# Patient Record
Sex: FEMALE | Race: WHITE | NOT HISPANIC OR LATINO | Employment: OTHER | URBAN - NONMETROPOLITAN AREA
[De-identification: names, ages, dates, MRNs, and addresses within clinical notes are randomized per-mention and may not be internally consistent; named-entity substitution may affect disease eponyms.]

---

## 2018-02-06 ENCOUNTER — APPOINTMENT (OUTPATIENT)
Dept: CT IMAGING | Facility: HOSPITAL | Age: 33
End: 2018-02-06

## 2018-02-06 ENCOUNTER — HOSPITAL ENCOUNTER (EMERGENCY)
Facility: HOSPITAL | Age: 33
Discharge: HOME | End: 2018-02-06
Attending: EMERGENCY MEDICINE | Admitting: EMERGENCY MEDICINE

## 2018-02-06 ENCOUNTER — APPOINTMENT (OUTPATIENT)
Dept: GENERAL RADIOLOGY | Facility: HOSPITAL | Age: 33
End: 2018-02-06

## 2018-02-06 VITALS
HEART RATE: 85 BPM | TEMPERATURE: 98.9 F | BODY MASS INDEX: 27.75 KG/M2 | WEIGHT: 147 LBS | DIASTOLIC BLOOD PRESSURE: 73 MMHG | RESPIRATION RATE: 12 BRPM | HEIGHT: 61 IN | OXYGEN SATURATION: 97 % | SYSTOLIC BLOOD PRESSURE: 116 MMHG

## 2018-02-06 DIAGNOSIS — R00.2 PALPITATION: Primary | ICD-10-CM

## 2018-02-06 LAB
ALBUMIN SERPL-MCNC: 4.4 G/DL (ref 3.5–5)
ALBUMIN/GLOB SERPL: 1.4 G/DL (ref 1.1–2.5)
ALP SERPL-CCNC: 115 U/L (ref 24–120)
ALT SERPL W P-5'-P-CCNC: 27 U/L (ref 0–54)
AMPHET+METHAMPHET UR QL: NEGATIVE
ANION GAP SERPL CALCULATED.3IONS-SCNC: 13 MMOL/L (ref 4–13)
AST SERPL-CCNC: 23 U/L (ref 7–45)
BARBITURATES UR QL SCN: NEGATIVE
BASOPHILS # BLD AUTO: 0.01 10*3/MM3 (ref 0–0.2)
BASOPHILS NFR BLD AUTO: 0.2 % (ref 0–2)
BENZODIAZ UR QL SCN: NEGATIVE
BILIRUB SERPL-MCNC: 0.2 MG/DL (ref 0.1–1)
BUN BLD-MCNC: 9 MG/DL (ref 5–21)
BUN/CREAT SERPL: 11.5 (ref 7–25)
CALCIUM SPEC-SCNC: 9.3 MG/DL (ref 8.4–10.4)
CANNABINOIDS SERPL QL: NEGATIVE
CHLORIDE SERPL-SCNC: 105 MMOL/L (ref 98–110)
CO2 SERPL-SCNC: 25 MMOL/L (ref 24–31)
COCAINE UR QL: NEGATIVE
CREAT BLD-MCNC: 0.78 MG/DL (ref 0.5–1.4)
D DIMER PPP FEU-MCNC: 0.33 MG/L (FEU) (ref 0–0.5)
DEPRECATED RDW RBC AUTO: 40 FL (ref 40–54)
EOSINOPHIL # BLD AUTO: 0.14 10*3/MM3 (ref 0–0.7)
EOSINOPHIL NFR BLD AUTO: 3.3 % (ref 0–4)
ERYTHROCYTE [DISTWIDTH] IN BLOOD BY AUTOMATED COUNT: 12.1 % (ref 12–15)
FLUAV AG NPH QL: NEGATIVE
FLUBV AG NPH QL IA: NEGATIVE
GFR SERPL CREATININE-BSD FRML MDRD: 87 ML/MIN/1.73
GLOBULIN UR ELPH-MCNC: 3.1 GM/DL
GLUCOSE BLD-MCNC: 89 MG/DL (ref 70–100)
HCG SERPL QL: NEGATIVE
HCT VFR BLD AUTO: 41.6 % (ref 37–47)
HGB BLD-MCNC: 14 G/DL (ref 12–16)
IMM GRANULOCYTES # BLD: 0.01 10*3/MM3 (ref 0–0.03)
IMM GRANULOCYTES NFR BLD: 0.2 % (ref 0–5)
LYMPHOCYTES # BLD AUTO: 0.91 10*3/MM3 (ref 0.72–4.86)
LYMPHOCYTES NFR BLD AUTO: 21.2 % (ref 15–45)
MCH RBC QN AUTO: 30.2 PG (ref 28–32)
MCHC RBC AUTO-ENTMCNC: 33.7 G/DL (ref 33–36)
MCV RBC AUTO: 89.8 FL (ref 82–98)
METHADONE UR QL SCN: NEGATIVE
MONOCYTES # BLD AUTO: 0.69 10*3/MM3 (ref 0.19–1.3)
MONOCYTES NFR BLD AUTO: 16 % (ref 4–12)
NEUTROPHILS # BLD AUTO: 2.54 10*3/MM3 (ref 1.87–8.4)
NEUTROPHILS NFR BLD AUTO: 59.1 % (ref 39–78)
NRBC BLD MANUAL-RTO: 0 /100 WBC (ref 0–0)
OPIATES UR QL: NEGATIVE
PCP UR QL SCN: NEGATIVE
PLATELET # BLD AUTO: 216 10*3/MM3 (ref 130–400)
PMV BLD AUTO: 9.6 FL (ref 6–12)
POTASSIUM BLD-SCNC: 3.6 MMOL/L (ref 3.5–5.3)
PROT SERPL-MCNC: 7.5 G/DL (ref 6.3–8.7)
RBC # BLD AUTO: 4.63 10*6/MM3 (ref 4.2–5.4)
SODIUM BLD-SCNC: 143 MMOL/L (ref 135–145)
TROPONIN I SERPL-MCNC: <0.012 NG/ML (ref 0–0.03)
WBC NRBC COR # BLD: 4.3 10*3/MM3 (ref 4.8–10.8)

## 2018-02-06 PROCEDURE — 85379 FIBRIN DEGRADATION QUANT: CPT | Performed by: EMERGENCY MEDICINE

## 2018-02-06 PROCEDURE — 80307 DRUG TEST PRSMV CHEM ANLYZR: CPT | Performed by: EMERGENCY MEDICINE

## 2018-02-06 PROCEDURE — 93005 ELECTROCARDIOGRAM TRACING: CPT | Performed by: EMERGENCY MEDICINE

## 2018-02-06 PROCEDURE — 99284 EMERGENCY DEPT VISIT MOD MDM: CPT

## 2018-02-06 PROCEDURE — 84484 ASSAY OF TROPONIN QUANT: CPT | Performed by: EMERGENCY MEDICINE

## 2018-02-06 PROCEDURE — 80053 COMPREHEN METABOLIC PANEL: CPT | Performed by: EMERGENCY MEDICINE

## 2018-02-06 PROCEDURE — 85025 COMPLETE CBC W/AUTO DIFF WBC: CPT | Performed by: EMERGENCY MEDICINE

## 2018-02-06 PROCEDURE — 87804 INFLUENZA ASSAY W/OPTIC: CPT | Performed by: EMERGENCY MEDICINE

## 2018-02-06 PROCEDURE — 84703 CHORIONIC GONADOTROPIN ASSAY: CPT | Performed by: EMERGENCY MEDICINE

## 2018-02-06 PROCEDURE — 70450 CT HEAD/BRAIN W/O DYE: CPT

## 2018-02-06 PROCEDURE — 71045 X-RAY EXAM CHEST 1 VIEW: CPT

## 2018-02-06 PROCEDURE — 93010 ELECTROCARDIOGRAM REPORT: CPT | Performed by: INTERNAL MEDICINE

## 2018-02-06 NOTE — ED PROVIDER NOTES
Subjective   HPI Comments: Patient with palpitation she feels like she is floating in a [place]    Patient is a 32 y.o. female presenting with palpitations.   Palpitations   Palpitations quality:  Irregular  Onset quality:  Gradual  Timing:  Intermittent  Progression:  Worsening  Chronicity:  New  Context: not anxiety, not appetite suppressants, not blood loss, not bronchodilators, not dehydration, not exercise, not hyperventilation, not illicit drugs, not nicotine and not stimulant use    Relieved by:  Nothing  Worsened by:  Nothing  Associated symptoms: no back pain, no chest pain, no diaphoresis, no dizziness, no leg pain, no lower extremity edema, no nausea, no near-syncope, no numbness, no orthopnea, no PND, no shortness of breath, no vomiting and no weakness    Risk factors: no diabetes mellitus, no heart disease, no hx of thyroid disease, no hypercoagulable state and no stress        Review of Systems   Constitutional: Negative.  Negative for activity change, appetite change, chills, diaphoresis, fatigue and fever.   HENT: Negative for congestion, drooling, ear pain, facial swelling, hearing loss, sinus pressure and sore throat.    Eyes: Negative.  Negative for discharge.   Respiratory: Negative for shortness of breath.    Cardiovascular: Positive for palpitations. Negative for chest pain, orthopnea, PND and near-syncope.   Gastrointestinal: Negative for abdominal distention, abdominal pain, blood in stool, diarrhea, nausea and vomiting.   Endocrine: Negative.  Negative for cold intolerance, heat intolerance, polydipsia, polyphagia and polyuria.   Genitourinary: Negative.  Negative for dysuria, flank pain and urgency.   Musculoskeletal: Negative.  Negative for arthralgias, back pain, myalgias and neck stiffness.   Skin: Negative.  Negative for color change, pallor and rash.   Allergic/Immunologic: Negative.    Neurological: Negative.  Negative for dizziness, seizures, speech difficulty, weakness, numbness and  headaches.   Hematological: Negative.  Negative for adenopathy.   All other systems reviewed and are negative.      Past Medical History:   Diagnosis Date   • Migraine        No Known Allergies    Past Surgical History:   Procedure Laterality Date   •  SECTION         History reviewed. No pertinent family history.    Social History     Social History   • Marital status:      Spouse name: N/A   • Number of children: N/A   • Years of education: N/A     Social History Main Topics   • Smoking status: Never Smoker   • Smokeless tobacco: None   • Alcohol use No   • Drug use: No   • Sexual activity: Defer     Other Topics Concern   • None     Social History Narrative   • None           Objective   Physical Exam   Constitutional: She is oriented to person, place, and time. She appears well-developed and well-nourished.   HENT:   Head: Normocephalic.   Right Ear: External ear normal.   Eyes: Conjunctivae are normal. Pupils are equal, round, and reactive to light.   Neck: Normal range of motion. Neck supple.   Cardiovascular: Normal rate, regular rhythm, normal heart sounds and intact distal pulses.  PMI is not displaced.  Exam reveals no decreased pulses.    No murmur heard.  Pulmonary/Chest: Effort normal and breath sounds normal. No accessory muscle usage. No tachypnea. No respiratory distress. She has no decreased breath sounds. She has no wheezes. She has no rales. She exhibits no tenderness.   Abdominal: Soft. Bowel sounds are normal. There is no tenderness.   Musculoskeletal: Normal range of motion. She exhibits no edema or tenderness.   Lower extremity exam bilaterally is unremarkable.  There is no right or left calf tenderness .  There is no palpable venous cord.  No obvious difference in the size of the legs.  No pitting edema.  The dorsalis pedis and posterior tibial femoral and popliteal pulses are palpable and +2 bilaterally.  Homans sign is negative       Vascular Status -  Her exam exhibits right  foot vasculature normal. Her exam exhibits no right foot edema. Her exam exhibits left foot vasculature normal. Her exam exhibits no left foot edema.  Neurological: She is alert and oriented to person, place, and time. She has normal reflexes. No cranial nerve deficit. Coordination normal.   Skin: Skin is warm. No rash noted. No erythema.   Nursing note and vitals reviewed.      Procedures         ED Course  ED Course   Comment By Time   Pt case turned over feeling foggy and palpitation Ken Pierce MD 02/06 1826   Case turned over to Dr Khushbu Pierce MD 02/06 1826                  [facility]    Final diagnoses:   Palpitation            Ken Pierce MD  02/06/18 3047

## 2018-02-07 NOTE — ED PROVIDER NOTES
Subjective   History of Present Illness    Review of Systems    Past Medical History:   Diagnosis Date   • Migraine        No Known Allergies    Past Surgical History:   Procedure Laterality Date   •  SECTION         History reviewed. No pertinent family history.    Social History     Social History   • Marital status:      Spouse name: N/A   • Number of children: N/A   • Years of education: N/A     Social History Main Topics   • Smoking status: Never Smoker   • Smokeless tobacco: None   • Alcohol use No   • Drug use: No   • Sexual activity: Defer     Other Topics Concern   • None     Social History Narrative   • None           Objective   Physical Exam    Procedures         ED Course  ED Course   Comment By Time   Pt case turned over feeling foggy and palpitation Ken Pierce MD 1826   Case turned over to Dr Khushbu Pierce MD 1826      Lab Results (last 24 hours)     Procedure Component Value Units Date/Time    CBC & Differential [[id number]] Collected:  18    Specimen:  Blood Updated:  18    Narrative:       The following orders were created for panel order CBC & Differential.  Procedure                               Abnormality         Status                     ---------                               -----------         ------                     CBC Auto Differential[[id number]]        Abnormal            Final result                 Please view results for these tests on the individual orders.    hCG, Serum, Qualitative [[id number]]  (Normal) Collected:  18    Specimen:  Blood Updated:  18     HCG Qualitative Negative    D-dimer, Quantitative [[id number]]  (Normal) Collected:  18    Specimen:  Blood Updated:  18     D-Dimer, Quantitative 0.33 mg/L (FEU)     Narrative:       Reference Range is 0-0.50 mg/L FEU. However, results <0.50 mg/L FEU tends to rule out DVT or PE. Results >0.50 mg/L FEU are not useful in  predicting absence or presence of DVT or PE.    CBC Auto Differential [[id number]]  (Abnormal) Collected:  02/06/18 1738    Specimen:  Blood Updated:  02/06/18 1750     WBC 4.30 (L) 10*3/mm3      RBC 4.63 10*6/mm3      Hemoglobin 14.0 g/dL      Hematocrit 41.6 %      MCV 89.8 fL      MCH 30.2 pg      MCHC 33.7 g/dL      RDW 12.1 %      RDW-SD 40.0 fl      MPV 9.6 fL      Platelets 216 10*3/mm3      Neutrophil % 59.1 %      Lymphocyte % 21.2 %      Monocyte % 16.0 (H) %      Eosinophil % 3.3 %      Basophil % 0.2 %      Immature Grans % 0.2 %      Neutrophils, Absolute 2.54 10*3/mm3      Lymphocytes, Absolute 0.91 10*3/mm3      Monocytes, Absolute 0.69 10*3/mm3      Eosinophils, Absolute 0.14 10*3/mm3      Basophils, Absolute 0.01 10*3/mm3      Immature Grans, Absolute 0.01 10*3/mm3      nRBC 0.0 /100 WBC     Comprehensive Metabolic Panel [[id number]] Collected:  02/06/18 1739    Specimen:  Blood Updated:  02/06/18 1815     Glucose 89 mg/dL      BUN 9 mg/dL       Specimen hemolyzed. Results may be affected.        Creatinine 0.78 mg/dL      Sodium 143 mmol/L      Potassium 3.6 mmol/L       Specimen hemolyzed.  Results may be affected.        Chloride 105 mmol/L      CO2 25.0 mmol/L      Calcium 9.3 mg/dL      Total Protein 7.5 g/dL      Albumin 4.40 g/dL      ALT (SGPT) 27 U/L       Specimen hemolyzed.  Results may be affected.        AST (SGOT) 23 U/L       Specimen hemolyzed.  Results may be affected.        Alkaline Phosphatase 115 U/L       Specimen hemolyzed. Results may be affected.        Total Bilirubin 0.2 mg/dL      eGFR Non African Amer 87 mL/min/1.73      Globulin 3.1 gm/dL      A/G Ratio 1.4 g/dL      BUN/Creatinine Ratio 11.5     Anion Gap 13.0 mmol/L     Troponin [[id number]]  (Normal) Collected:  02/06/18 1739    Specimen:  Blood Updated:  02/06/18 1810     Troponin I <0.012 ng/mL     Urine Drug Screen - Urine, Clean Catch [[id number]]  (Normal) Collected:  02/06/18 1759    Specimen:  Urine from Urine,  Clean Catch Updated:  02/06/18 1822     Amphetamine Screen, Urine Negative     Barbiturates Screen, Urine Negative     Benzodiazepine Screen, Urine Negative     Cocaine Screen, Urine Negative     Methadone Screen, Urine Negative     Opiate Screen Negative     Phencyclidine (PCP), Urine Negative     THC, Screen, Urine Negative    Narrative:       Negative Thresholds For Drugs Screened in Urine:    Amphetamines          500 ng/ml  Barbiturates          200 ng/ml  Benzodiazepines       200 ng/ml  Cocaine               150 ng/ml  Methadone             150 ng/ml  Opiates               300 ng/ml  Phencyclidine         25 ng/ml  THC                      50 ng/ml    The normal value for all drugs tested is negative. This report includes final unconfirmed screening results.  A positive result by this assay can be, at your request, sent to the Reference Lab for confirmation by gas chromatography. Unconfirmed results must not be used for non-medical purposes, such as employment or legal testing. Clinical consideration should be applied to any drug of abuse test result, particularly when unconfirmed results are used.    Influenza Antigen, Rapid - Swab, Nasopharynx [[id number]]  (Normal) Collected:  02/06/18 1802    Specimen:  Swab from Nasopharynx Updated:  02/06/18 1828     Influenza A Ag, EIA Negative     Influenza B Ag, EIA Negative    Narrative:         Recommend confirmation of negative results by viral culture or molecular assay.        CT Head Without Contrast   Final Result   Impression:   No acute intracranial abnormality.   This report was finalized on 02/06/2018 20:06 by  Chris Freeman, .      XR Chest 1 View   Final Result   Impression:   Negative chest radiograph.   This report was finalized on 02/06/2018 18:12 by  Chris Freeman, .        LABS were negative.  Imaging was negative.  No cause for palpitations found.  Patient was monitored throughout her stay.  Patient has recently stopped her Topamax and advised to follow-up  with her PCP for further evaluation of her migraines.  No pain while here in the ER.  Patient will also be scheduled for an outpatient Holter monitor.  Return immediately for any chest pain, worsening palpitations, fever or other concerns.            MDM    Final diagnoses:   Palpitation            Nanci Ríos MD  02/06/18 2023

## 2018-02-08 NOTE — ED NOTES
"ED Call Back Questions    1. How are you doing since leaving the Emergency Department?    Doing ok  2. Do you have any questions about your discharge instructions? No     3. Have you filled your new prescriptions yet? N/A  a. Do you have any questions about those medications? N/A    4. Were you able to make a follow-up appointment with the physician? No     5. Do you have a primary care physician? Yes   a. If No, would you like for me to set you up with one? N/A  i. If Yes, “I will have our ED  give you a call right back at this number to work with you on the best time for an appointment.”    6. We are always looking to get better at what we do. Do you have any suggestions for what we can do to be even better? N/A  a. If Yes, \"Thank you for sharing your concerns. I apologize. I will follow up with our manager and patient . Would you like someone to call you back?\" N/A    7. Is there anything else I can do for you? N/A  Visit was good     Luis Alberto Rodriguez  02/08/18 3710    "

## 2020-11-05 LAB
EXTERNAL ANTIBODY SCREEN: NEGATIVE
EXTERNAL CHLAMYDIA SCREEN: NEGATIVE
EXTERNAL GONORRHEA SCREEN: NEGATIVE
EXTERNAL HEPATITIS B SURFACE ANTIGEN: NEGATIVE
EXTERNAL HERPES PCR: NEGATIVE
EXTERNAL RUBELLA QUALITATIVE: NORMAL
EXTERNAL SYPHILIS RPR SCREEN: NEGATIVE
HIV1 P24 AG SERPL QL IA: NORMAL

## 2020-11-16 PROCEDURE — U0003 INFECTIOUS AGENT DETECTION BY NUCLEIC ACID (DNA OR RNA); SEVERE ACUTE RESPIRATORY SYNDROME CORONAVIRUS 2 (SARS-COV-2) (CORONAVIRUS DISEASE [COVID-19]), AMPLIFIED PROBE TECHNIQUE, MAKING USE OF HIGH THROUGHPUT TECHNOLOGIES AS DESCRIBED BY CMS-2020-01-R: HCPCS | Performed by: NURSE PRACTITIONER

## 2021-01-23 ENCOUNTER — HOSPITAL ENCOUNTER (EMERGENCY)
Facility: HOSPITAL | Age: 36
Discharge: HOME | End: 2021-01-23
Attending: EMERGENCY MEDICINE | Admitting: EMERGENCY MEDICINE

## 2021-01-23 VITALS
BODY MASS INDEX: 29.07 KG/M2 | DIASTOLIC BLOOD PRESSURE: 75 MMHG | SYSTOLIC BLOOD PRESSURE: 124 MMHG | HEIGHT: 61 IN | HEART RATE: 97 BPM | OXYGEN SATURATION: 98 % | RESPIRATION RATE: 16 BRPM | TEMPERATURE: 98.9 F | WEIGHT: 154 LBS

## 2021-01-23 DIAGNOSIS — K08.89 PAIN, DENTAL: Primary | ICD-10-CM

## 2021-01-23 PROCEDURE — 99283 EMERGENCY DEPT VISIT LOW MDM: CPT

## 2021-01-23 RX ORDER — PENICILLIN V POTASSIUM 500 MG/1
500 TABLET ORAL 4 TIMES DAILY
Qty: 20 TABLET | Refills: 0 | Status: SHIPPED | OUTPATIENT
Start: 2021-01-23 | End: 2021-01-28

## 2021-01-23 RX ORDER — ACETAMINOPHEN 500 MG
1000 TABLET ORAL ONCE
Status: COMPLETED | OUTPATIENT
Start: 2021-01-23 | End: 2021-01-23

## 2021-01-23 RX ADMIN — ACETAMINOPHEN 1000 MG: 500 TABLET, FILM COATED ORAL at 13:01

## 2021-01-23 NOTE — DISCHARGE INSTRUCTIONS
Please return immediately to the emergency department for any new or worsening symptoms. Please see a dentist on Monday for re-evaluation.

## 2021-01-23 NOTE — ED PROVIDER NOTES
Subjective   This is a very pleasant 35-year-old lady with a past medical history of migraines who is currently 20 weeks pregnant who presents the emergency department chief complaint of dental pain.  She first noticed this yesterday.  Gradual in onset.  Moderate.  Sharp.  Nonradiating.  No exacerbating relieving factors.  Located in her front, upper teeth.  Denies any fevers, chills, voice changes, trouble swallowing, swelling, changes in vision, painful eye movements, chest pain, or shortness of breath.  States she was trying to see a dentist today but unable to get into see 1.    Past medical history: Migraines  Social history: Denies tobacco, alcohol, or illicit drug use      History provided by:  Patient      Review of Systems   All other systems reviewed and are negative.      Past Medical History:   Diagnosis Date   • Migraine        No Known Allergies    Past Surgical History:   Procedure Laterality Date   •  SECTION         History reviewed. No pertinent family history.    Social History     Socioeconomic History   • Marital status:      Spouse name: Not on file   • Number of children: Not on file   • Years of education: Not on file   • Highest education level: Not on file   Tobacco Use   • Smoking status: Never Smoker   Substance and Sexual Activity   • Alcohol use: No   • Drug use: No   • Sexual activity: Defer           Objective   Physical Exam  Vitals signs and nursing note reviewed.   Constitutional:       General: She is not in acute distress.     Appearance: Normal appearance. She is normal weight. She is not ill-appearing, toxic-appearing or diaphoretic.   HENT:      Head: Normocephalic and atraumatic.      Comments: Dental exam shows no obvious swelling although patient has pain to the gingiva along her left central incisor on the maxillary side.  Extraocular movements are intact.  Midline uvula.  No submandibular or sublingual swelling.  No pain with full range of motion of the  neck.     Nose: Nose normal.      Mouth/Throat:      Mouth: Mucous membranes are moist.   Eyes:      Extraocular Movements: Extraocular movements intact.   Neck:      Musculoskeletal: Normal range of motion and neck supple.   Cardiovascular:      Rate and Rhythm: Normal rate and regular rhythm.      Pulses: Normal pulses.      Heart sounds: Normal heart sounds. No murmur. No friction rub. No gallop.    Pulmonary:      Effort: Pulmonary effort is normal. No respiratory distress.      Breath sounds: Normal breath sounds. No stridor. No wheezing, rhonchi or rales.   Abdominal:      General: Abdomen is flat. Bowel sounds are normal. There is no distension.      Palpations: There is no mass.      Tenderness: There is no abdominal tenderness. There is no guarding or rebound.      Hernia: No hernia is present.   Musculoskeletal: Normal range of motion.         General: No swelling or tenderness.   Skin:     General: Skin is warm and dry.      Capillary Refill: Capillary refill takes less than 2 seconds.      Coloration: Skin is not jaundiced or pale.      Findings: No bruising, erythema, lesion or rash.   Neurological:      General: No focal deficit present.      Mental Status: She is alert and oriented to person, place, and time.   Psychiatric:         Mood and Affect: Mood normal.         Behavior: Behavior normal.         Thought Content: Thought content normal.         Judgment: Judgment normal.         Procedures           ED Course                                           MDM  Number of Diagnoses or Management Options  Pain, dental: new and requires workup  Diagnosis management comments: Patient presents with dental pain.  Upon arrival in no acute distress vital signs are reassuring.  Low concern for facial cellulitis with no erythema along the face, no fever, no chills.  Low concern for orbital cellulitis or venous sinus thrombus with extraocular movements intact without pain or limitation.  Low concern for  Abdoul's angina with no submandibular or sublingual swelling.  Low concern for retropharyngeal abscess with full range of motion of the neck without pain.  No signs of peritonsillar abscess on exam.  She has no systemic signs of infection.  No pyogenic granuloma on exam.  No obvious dental abscess or swelling on exam.  She has no trouble tolerating her secretions or even p.o. intake.  She has no chest pain or shortness of breath.  Had a long discussion with the patient about pain control and how we can only use Tylenol because she is pregnant and she graciously verbalizes understanding and is given Tylenol.  No indications for need for CT scan at this time.  No drainable lesion on exam.  She is given prophylactic penicillin.  She states she will see a dentist on Monday.  We had a long discussion about return precautions and she verbalized understanding.  She is discharged in good condition with normal vitals and is given commonsense return precautions which she verbalizes understanding of.    Risk of Complications, Morbidity, and/or Mortality  Presenting problems: moderate  Diagnostic procedures: low  Management options: moderate        Final diagnoses:   Pain, dental            Jude Roach MD  01/23/21 1325

## 2021-05-20 LAB — EXTERNAL GROUP B STREP ANTIGEN: NEGATIVE

## 2021-06-10 ENCOUNTER — HOSPITAL ENCOUNTER (OUTPATIENT)
Facility: HOSPITAL | Age: 36
Setting detail: OBSERVATION
Discharge: HOME | End: 2021-06-10
Attending: OBSTETRICS & GYNECOLOGY | Admitting: OBSTETRICS & GYNECOLOGY

## 2021-06-10 VITALS
HEART RATE: 88 BPM | TEMPERATURE: 98.6 F | RESPIRATION RATE: 18 BRPM | WEIGHT: 177 LBS | HEIGHT: 61 IN | DIASTOLIC BLOOD PRESSURE: 74 MMHG | BODY MASS INDEX: 33.42 KG/M2 | SYSTOLIC BLOOD PRESSURE: 137 MMHG

## 2021-06-10 PROBLEM — Z34.90 PREGNANCY: Status: ACTIVE | Noted: 2021-06-10

## 2021-06-10 LAB
ALBUMIN SERPL-MCNC: 2.9 G/DL (ref 3.5–5.2)
ALBUMIN/GLOB SERPL: 0.9 G/DL
ALP SERPL-CCNC: 152 U/L (ref 39–117)
ALT SERPL W P-5'-P-CCNC: 11 U/L (ref 1–33)
ANION GAP SERPL CALCULATED.3IONS-SCNC: 9 MMOL/L (ref 5–15)
AST SERPL-CCNC: 27 U/L (ref 1–32)
BILIRUB SERPL-MCNC: <0.2 MG/DL (ref 0–1.2)
BUN SERPL-MCNC: 13 MG/DL (ref 6–20)
BUN/CREAT SERPL: 32.5 (ref 7–25)
CALCIUM SPEC-SCNC: 9.3 MG/DL (ref 8.6–10.5)
CHLORIDE SERPL-SCNC: 106 MMOL/L (ref 98–107)
CO2 SERPL-SCNC: 23 MMOL/L (ref 22–29)
CREAT SERPL-MCNC: 0.4 MG/DL (ref 0.57–1)
DEPRECATED RDW RBC AUTO: 47.2 FL (ref 37–54)
ERYTHROCYTE [DISTWIDTH] IN BLOOD BY AUTOMATED COUNT: 13.9 % (ref 12.3–15.4)
EXPIRATION DATE: ABNORMAL
GFR SERPL CREATININE-BSD FRML MDRD: >150 ML/MIN/1.73
GLOBULIN UR ELPH-MCNC: 3.2 GM/DL
GLUCOSE SERPL-MCNC: 83 MG/DL (ref 65–99)
HCT VFR BLD AUTO: 33.2 % (ref 34–46.6)
HGB BLD-MCNC: 11 G/DL (ref 12–15.9)
LDH SERPL-CCNC: 243 U/L (ref 135–214)
Lab: 5015
MCH RBC QN AUTO: 30.6 PG (ref 26.6–33)
MCHC RBC AUTO-ENTMCNC: 33.1 G/DL (ref 31.5–35.7)
MCV RBC AUTO: 92.2 FL (ref 79–97)
PLATELET # BLD AUTO: 191 10*3/MM3 (ref 140–450)
PMV BLD AUTO: 10.5 FL (ref 6–12)
POTASSIUM SERPL-SCNC: 4.1 MMOL/L (ref 3.5–5.2)
PROT SERPL-MCNC: 6.1 G/DL (ref 6–8.5)
PROT UR STRIP-MCNC: ABNORMAL MG/DL
RBC # BLD AUTO: 3.6 10*6/MM3 (ref 3.77–5.28)
SODIUM SERPL-SCNC: 138 MMOL/L (ref 136–145)
URATE SERPL-MCNC: 4.6 MG/DL (ref 2.4–5.7)
WBC # BLD AUTO: 7.87 10*3/MM3 (ref 3.4–10.8)

## 2021-06-10 PROCEDURE — G0378 HOSPITAL OBSERVATION PER HR: HCPCS

## 2021-06-10 PROCEDURE — G0463 HOSPITAL OUTPT CLINIC VISIT: HCPCS

## 2021-06-10 PROCEDURE — 83615 LACTATE (LD) (LDH) ENZYME: CPT | Performed by: OBSTETRICS & GYNECOLOGY

## 2021-06-10 PROCEDURE — 84550 ASSAY OF BLOOD/URIC ACID: CPT | Performed by: OBSTETRICS & GYNECOLOGY

## 2021-06-10 PROCEDURE — 81002 URINALYSIS NONAUTO W/O SCOPE: CPT | Performed by: OBSTETRICS & GYNECOLOGY

## 2021-06-10 PROCEDURE — 85027 COMPLETE CBC AUTOMATED: CPT | Performed by: OBSTETRICS & GYNECOLOGY

## 2021-06-10 PROCEDURE — 80053 COMPREHEN METABOLIC PANEL: CPT | Performed by: OBSTETRICS & GYNECOLOGY

## 2021-06-11 NOTE — PROGRESS NOTES
Dylan Ureña  : 1985  MRN: [id number]  CSN: [id number]    Labor progress note    Subjective   She reports no problems, elevated BP in office.  Labs and BP normal on LDR     Objective   Min/max vitals past 24 hours:  Temp  Min: 98.6 °F (37 °C)  Max: 98.6 °F (37 °C)   BP  Min: 123/76  Max: 139/77   Pulse  Min: 88  Max: 114   Resp  Min: 18  Max: 18        FHT's: reactive and category 1.  external monitors used   Cervix: was not checked.   Contractions: none      Assessment   1. IUP at 38w6d  2. Elevated BP not found     Plan   1. OK to discharge    Priya Moreno MD  2021  09:26 CDT

## 2021-06-15 ENCOUNTER — PREP FOR SURGERY (OUTPATIENT)
Dept: OTHER | Facility: HOSPITAL | Age: 36
End: 2021-06-15

## 2021-06-15 DIAGNOSIS — O34.211 MATERNAL CARE FOR LOW TRANSVERSE SCAR FROM PREVIOUS CESAREAN DELIVERY: Primary | ICD-10-CM

## 2021-06-15 RX ORDER — BUPIVACAINE HCL/0.9 % NACL/PF 0.1 %
2 PLASTIC BAG, INJECTION (ML) EPIDURAL ONCE
Status: CANCELLED | OUTPATIENT
Start: 2021-06-16

## 2021-06-15 RX ORDER — SODIUM CHLORIDE 0.9 % (FLUSH) 0.9 %
3 SYRINGE (ML) INJECTION EVERY 12 HOURS SCHEDULED
Status: CANCELLED | OUTPATIENT
Start: 2021-06-16

## 2021-06-15 RX ORDER — SODIUM CHLORIDE 0.9 % (FLUSH) 0.9 %
3-10 SYRINGE (ML) INJECTION AS NEEDED
Status: CANCELLED | OUTPATIENT
Start: 2021-06-16

## 2021-06-15 RX ORDER — SODIUM CHLORIDE, SODIUM LACTATE, POTASSIUM CHLORIDE, CALCIUM CHLORIDE 600; 310; 30; 20 MG/100ML; MG/100ML; MG/100ML; MG/100ML
125 INJECTION, SOLUTION INTRAVENOUS CONTINUOUS
Status: CANCELLED | OUTPATIENT
Start: 2021-06-16

## 2021-06-15 RX ORDER — LIDOCAINE HYDROCHLORIDE 10 MG/ML
5 INJECTION, SOLUTION EPIDURAL; INFILTRATION; INTRACAUDAL; PERINEURAL AS NEEDED
Status: CANCELLED | OUTPATIENT
Start: 2021-06-16

## 2021-06-16 ENCOUNTER — ANESTHESIA EVENT (OUTPATIENT)
Dept: LABOR AND DELIVERY | Facility: HOSPITAL | Age: 36
End: 2021-06-16

## 2021-06-16 ENCOUNTER — ANESTHESIA (OUTPATIENT)
Dept: LABOR AND DELIVERY | Facility: HOSPITAL | Age: 36
End: 2021-06-16

## 2021-06-16 ENCOUNTER — HOSPITAL ENCOUNTER (INPATIENT)
Facility: HOSPITAL | Age: 36
LOS: 3 days | Discharge: HOME | End: 2021-06-19
Attending: OBSTETRICS & GYNECOLOGY | Admitting: OBSTETRICS & GYNECOLOGY

## 2021-06-16 DIAGNOSIS — O34.211 MATERNAL CARE FOR LOW TRANSVERSE SCAR FROM PREVIOUS CESAREAN DELIVERY: ICD-10-CM

## 2021-06-16 PROBLEM — Z34.90 PREGNANCY: Status: RESOLVED | Noted: 2021-06-10 | Resolved: 2021-06-16

## 2021-06-16 LAB
ABO GROUP BLD: NORMAL
BASOPHILS # BLD AUTO: 0.02 10*3/MM3 (ref 0–0.2)
BASOPHILS NFR BLD AUTO: 0.2 % (ref 0–1.5)
BLD GP AB SCN SERPL QL: NEGATIVE
DEPRECATED RDW RBC AUTO: 47.4 FL (ref 37–54)
EOSINOPHIL # BLD AUTO: 0.15 10*3/MM3 (ref 0–0.4)
EOSINOPHIL NFR BLD AUTO: 1.6 % (ref 0.3–6.2)
ERYTHROCYTE [DISTWIDTH] IN BLOOD BY AUTOMATED COUNT: 14.1 % (ref 12.3–15.4)
HCT VFR BLD AUTO: 33.8 % (ref 34–46.6)
HGB BLD-MCNC: 11.3 G/DL (ref 12–15.9)
IMM GRANULOCYTES # BLD AUTO: 0.05 10*3/MM3 (ref 0–0.05)
IMM GRANULOCYTES NFR BLD AUTO: 0.5 % (ref 0–0.5)
LYMPHOCYTES # BLD AUTO: 1.75 10*3/MM3 (ref 0.7–3.1)
LYMPHOCYTES NFR BLD AUTO: 18.8 % (ref 19.6–45.3)
MCH RBC QN AUTO: 30.9 PG (ref 26.6–33)
MCHC RBC AUTO-ENTMCNC: 33.4 G/DL (ref 31.5–35.7)
MCV RBC AUTO: 92.3 FL (ref 79–97)
MONOCYTES # BLD AUTO: 0.82 10*3/MM3 (ref 0.1–0.9)
MONOCYTES NFR BLD AUTO: 8.8 % (ref 5–12)
NEUTROPHILS NFR BLD AUTO: 6.52 10*3/MM3 (ref 1.7–7)
NEUTROPHILS NFR BLD AUTO: 70.1 % (ref 42.7–76)
NRBC BLD AUTO-RTO: 0 /100 WBC (ref 0–0.2)
PLATELET # BLD AUTO: 192 10*3/MM3 (ref 140–450)
PMV BLD AUTO: 10.5 FL (ref 6–12)
RBC # BLD AUTO: 3.66 10*6/MM3 (ref 3.77–5.28)
RH BLD: POSITIVE
T&S EXPIRATION DATE: NORMAL
WBC # BLD AUTO: 9.31 10*3/MM3 (ref 3.4–10.8)

## 2021-06-16 PROCEDURE — 86901 BLOOD TYPING SEROLOGIC RH(D): CPT | Performed by: OBSTETRICS & GYNECOLOGY

## 2021-06-16 PROCEDURE — 25010000002 CEFEPIME PER 500 MG: Performed by: OBSTETRICS & GYNECOLOGY

## 2021-06-16 PROCEDURE — C1765 ADHESION BARRIER: HCPCS | Performed by: OBSTETRICS & GYNECOLOGY

## 2021-06-16 PROCEDURE — 25010000002 DEXAMETHASONE PER 1 MG: Performed by: NURSE ANESTHETIST, CERTIFIED REGISTERED

## 2021-06-16 PROCEDURE — 94799 UNLISTED PULMONARY SVC/PX: CPT

## 2021-06-16 PROCEDURE — 86900 BLOOD TYPING SEROLOGIC ABO: CPT | Performed by: OBSTETRICS & GYNECOLOGY

## 2021-06-16 PROCEDURE — 25010000002 HYDROMORPHONE 1 MG/ML SOLUTION: Performed by: NURSE ANESTHETIST, CERTIFIED REGISTERED

## 2021-06-16 PROCEDURE — 25010000002 BUTORPHANOL PER 1 MG: Performed by: NURSE ANESTHETIST, CERTIFIED REGISTERED

## 2021-06-16 PROCEDURE — 85025 COMPLETE CBC W/AUTO DIFF WBC: CPT | Performed by: OBSTETRICS & GYNECOLOGY

## 2021-06-16 PROCEDURE — 88307 TISSUE EXAM BY PATHOLOGIST: CPT | Performed by: OBSTETRICS & GYNECOLOGY

## 2021-06-16 PROCEDURE — 51702 INSERT TEMP BLADDER CATH: CPT

## 2021-06-16 PROCEDURE — 25010000002 CEFAZOLIN PER 500 MG: Performed by: OBSTETRICS & GYNECOLOGY

## 2021-06-16 PROCEDURE — 25010000002 ONDANSETRON PER 1 MG: Performed by: NURSE ANESTHETIST, CERTIFIED REGISTERED

## 2021-06-16 PROCEDURE — 86850 RBC ANTIBODY SCREEN: CPT | Performed by: OBSTETRICS & GYNECOLOGY

## 2021-06-16 DEVICE — DEV CONTRL TISS STRATAFIX SPIRAL PGPCL 2/0FS 30X30CM: Type: IMPLANTABLE DEVICE | Site: ABDOMEN | Status: FUNCTIONAL

## 2021-06-16 DEVICE — ABSORBABLE ADHESION BARRIER
Type: IMPLANTABLE DEVICE | Site: ABDOMEN | Status: FUNCTIONAL
Brand: GYNECARE INTERCEED

## 2021-06-16 RX ORDER — SODIUM CHLORIDE 0.9 % (FLUSH) 0.9 %
3-10 SYRINGE (ML) INJECTION AS NEEDED
Status: DISCONTINUED | OUTPATIENT
Start: 2021-06-16 | End: 2021-06-19 | Stop reason: HOSPADM

## 2021-06-16 RX ORDER — ONDANSETRON 2 MG/ML
4 INJECTION INTRAMUSCULAR; INTRAVENOUS EVERY 6 HOURS PRN
Status: DISCONTINUED | OUTPATIENT
Start: 2021-06-16 | End: 2021-06-19 | Stop reason: HOSPADM

## 2021-06-16 RX ORDER — PROMETHAZINE HYDROCHLORIDE 12.5 MG/1
12.5 SUPPOSITORY RECTAL EVERY 6 HOURS PRN
Status: DISCONTINUED | OUTPATIENT
Start: 2021-06-16 | End: 2021-06-19 | Stop reason: HOSPADM

## 2021-06-16 RX ORDER — BUTORPHANOL TARTRATE 1 MG/ML
0.5 INJECTION, SOLUTION INTRAMUSCULAR; INTRAVENOUS EVERY 6 HOURS PRN
Status: DISCONTINUED | OUTPATIENT
Start: 2021-06-16 | End: 2021-06-19 | Stop reason: HOSPADM

## 2021-06-16 RX ORDER — PRENATAL VIT/IRON FUM/FOLIC AC 27MG-0.8MG
1 TABLET ORAL DAILY
Status: DISCONTINUED | OUTPATIENT
Start: 2021-06-16 | End: 2021-06-19 | Stop reason: HOSPADM

## 2021-06-16 RX ORDER — NALOXONE HCL 0.4 MG/ML
0.4 VIAL (ML) INJECTION
Status: ACTIVE | OUTPATIENT
Start: 2021-06-16 | End: 2021-06-17

## 2021-06-16 RX ORDER — EPHEDRINE SULFATE 50 MG/ML
INJECTION, SOLUTION INTRAVENOUS AS NEEDED
Status: DISCONTINUED | OUTPATIENT
Start: 2021-06-16 | End: 2021-06-16 | Stop reason: SURG

## 2021-06-16 RX ORDER — FAMOTIDINE 10 MG/ML
20 INJECTION, SOLUTION INTRAVENOUS ONCE AS NEEDED
Status: COMPLETED | OUTPATIENT
Start: 2021-06-16 | End: 2021-06-16

## 2021-06-16 RX ORDER — ONDANSETRON 4 MG/1
4 TABLET, FILM COATED ORAL EVERY 6 HOURS PRN
Status: DISCONTINUED | OUTPATIENT
Start: 2021-06-16 | End: 2021-06-19 | Stop reason: HOSPADM

## 2021-06-16 RX ORDER — ONDANSETRON 2 MG/ML
INJECTION INTRAMUSCULAR; INTRAVENOUS AS NEEDED
Status: DISCONTINUED | OUTPATIENT
Start: 2021-06-16 | End: 2021-06-16 | Stop reason: SURG

## 2021-06-16 RX ORDER — BUPIVACAINE HCL/0.9 % NACL/PF 0.1 %
2 PLASTIC BAG, INJECTION (ML) EPIDURAL ONCE
Status: COMPLETED | OUTPATIENT
Start: 2021-06-16 | End: 2021-06-16

## 2021-06-16 RX ORDER — SODIUM CHLORIDE, SODIUM LACTATE, POTASSIUM CHLORIDE, CALCIUM CHLORIDE 600; 310; 30; 20 MG/100ML; MG/100ML; MG/100ML; MG/100ML
125 INJECTION, SOLUTION INTRAVENOUS CONTINUOUS
Status: DISCONTINUED | OUTPATIENT
Start: 2021-06-16 | End: 2021-06-19 | Stop reason: HOSPADM

## 2021-06-16 RX ORDER — OXYCODONE HYDROCHLORIDE AND ACETAMINOPHEN 5; 325 MG/1; MG/1
1 TABLET ORAL EVERY 4 HOURS PRN
Status: DISCONTINUED | OUTPATIENT
Start: 2021-06-17 | End: 2021-06-18

## 2021-06-16 RX ORDER — OXYCODONE AND ACETAMINOPHEN 7.5; 325 MG/1; MG/1
2 TABLET ORAL EVERY 4 HOURS PRN
Status: ACTIVE | OUTPATIENT
Start: 2021-06-16 | End: 2021-06-17

## 2021-06-16 RX ORDER — OXYTOCIN 10 [USP'U]/ML
INJECTION, SOLUTION INTRAMUSCULAR; INTRAVENOUS AS NEEDED
Status: DISCONTINUED | OUTPATIENT
Start: 2021-06-16 | End: 2021-06-16 | Stop reason: SURG

## 2021-06-16 RX ORDER — OXYCODONE AND ACETAMINOPHEN 10; 325 MG/1; MG/1
1 TABLET ORAL EVERY 6 HOURS PRN
Status: DISCONTINUED | OUTPATIENT
Start: 2021-06-17 | End: 2021-06-18

## 2021-06-16 RX ORDER — OXYCODONE HYDROCHLORIDE AND ACETAMINOPHEN 5; 325 MG/1; MG/1
1 TABLET ORAL EVERY 4 HOURS PRN
Status: DISCONTINUED | OUTPATIENT
Start: 2021-06-16 | End: 2021-06-16

## 2021-06-16 RX ORDER — DEXAMETHASONE SODIUM PHOSPHATE 4 MG/ML
INJECTION, SOLUTION INTRA-ARTICULAR; INTRALESIONAL; INTRAMUSCULAR; INTRAVENOUS; SOFT TISSUE AS NEEDED
Status: DISCONTINUED | OUTPATIENT
Start: 2021-06-16 | End: 2021-06-16 | Stop reason: SURG

## 2021-06-16 RX ORDER — AMOXICILLIN 250 MG
1 CAPSULE ORAL 2 TIMES DAILY
Status: DISCONTINUED | OUTPATIENT
Start: 2021-06-16 | End: 2021-06-19 | Stop reason: HOSPADM

## 2021-06-16 RX ORDER — PROMETHAZINE HYDROCHLORIDE 25 MG/1
25 SUPPOSITORY RECTAL EVERY 4 HOURS PRN
Status: DISCONTINUED | OUTPATIENT
Start: 2021-06-16 | End: 2021-06-19 | Stop reason: HOSPADM

## 2021-06-16 RX ORDER — OXYCODONE AND ACETAMINOPHEN 7.5; 325 MG/1; MG/1
1 TABLET ORAL EVERY 4 HOURS PRN
Status: DISPENSED | OUTPATIENT
Start: 2021-06-16 | End: 2021-06-17

## 2021-06-16 RX ORDER — ONDANSETRON 2 MG/ML
4 INJECTION INTRAMUSCULAR; INTRAVENOUS EVERY 6 HOURS PRN
Status: DISCONTINUED | OUTPATIENT
Start: 2021-06-16 | End: 2021-06-16 | Stop reason: SDUPTHER

## 2021-06-16 RX ORDER — LIDOCAINE HYDROCHLORIDE 10 MG/ML
5 INJECTION, SOLUTION EPIDURAL; INFILTRATION; INTRACAUDAL; PERINEURAL AS NEEDED
Status: DISCONTINUED | OUTPATIENT
Start: 2021-06-16 | End: 2021-06-16 | Stop reason: HOSPADM

## 2021-06-16 RX ORDER — SODIUM CHLORIDE 0.9 % (FLUSH) 0.9 %
3-10 SYRINGE (ML) INJECTION AS NEEDED
Status: DISCONTINUED | OUTPATIENT
Start: 2021-06-16 | End: 2021-06-16 | Stop reason: HOSPADM

## 2021-06-16 RX ORDER — OXYTOCIN/0.9 % SODIUM CHLORIDE 30/500 ML
999 PLASTIC BAG, INJECTION (ML) INTRAVENOUS ONCE
Status: DISCONTINUED | OUTPATIENT
Start: 2021-06-16 | End: 2021-06-19 | Stop reason: HOSPADM

## 2021-06-16 RX ORDER — OXYTOCIN/0.9 % SODIUM CHLORIDE 30/500 ML
125 PLASTIC BAG, INJECTION (ML) INTRAVENOUS CONTINUOUS PRN
Status: DISCONTINUED | OUTPATIENT
Start: 2021-06-16 | End: 2021-06-19 | Stop reason: HOSPADM

## 2021-06-16 RX ORDER — SODIUM CHLORIDE, SODIUM LACTATE, POTASSIUM CHLORIDE, CALCIUM CHLORIDE 600; 310; 30; 20 MG/100ML; MG/100ML; MG/100ML; MG/100ML
125 INJECTION, SOLUTION INTRAVENOUS CONTINUOUS
Status: DISCONTINUED | OUTPATIENT
Start: 2021-06-16 | End: 2021-06-16

## 2021-06-16 RX ORDER — SODIUM CHLORIDE 0.9 % (FLUSH) 0.9 %
3 SYRINGE (ML) INJECTION EVERY 12 HOURS SCHEDULED
Status: DISCONTINUED | OUTPATIENT
Start: 2021-06-16 | End: 2021-06-16 | Stop reason: HOSPADM

## 2021-06-16 RX ORDER — FENTANYL CITRATE 50 UG/ML
INJECTION, SOLUTION INTRAMUSCULAR; INTRAVENOUS AS NEEDED
Status: DISCONTINUED | OUTPATIENT
Start: 2021-06-16 | End: 2021-06-16

## 2021-06-16 RX ORDER — SODIUM CHLORIDE 0.9 % (FLUSH) 0.9 %
3 SYRINGE (ML) INJECTION EVERY 12 HOURS SCHEDULED
Status: DISCONTINUED | OUTPATIENT
Start: 2021-06-16 | End: 2021-06-19 | Stop reason: HOSPADM

## 2021-06-16 RX ORDER — SCOLOPAMINE TRANSDERMAL SYSTEM 1 MG/1
PATCH, EXTENDED RELEASE TRANSDERMAL AS NEEDED
Status: DISCONTINUED | OUTPATIENT
Start: 2021-06-16 | End: 2021-06-16 | Stop reason: SURG

## 2021-06-16 RX ORDER — IBUPROFEN 800 MG/1
800 TABLET ORAL EVERY 8 HOURS PRN
Status: DISCONTINUED | OUTPATIENT
Start: 2021-06-16 | End: 2021-06-19 | Stop reason: HOSPADM

## 2021-06-16 RX ORDER — DOCUSATE SODIUM 100 MG/1
100 CAPSULE, LIQUID FILLED ORAL 2 TIMES DAILY PRN
Status: DISCONTINUED | OUTPATIENT
Start: 2021-06-16 | End: 2021-06-19 | Stop reason: HOSPADM

## 2021-06-16 RX ORDER — BISACODYL 5 MG/1
10 TABLET, DELAYED RELEASE ORAL DAILY PRN
Status: DISCONTINUED | OUTPATIENT
Start: 2021-06-16 | End: 2021-06-19 | Stop reason: HOSPADM

## 2021-06-16 RX ORDER — PROMETHAZINE HYDROCHLORIDE 25 MG/1
25 TABLET ORAL EVERY 6 HOURS PRN
Status: DISCONTINUED | OUTPATIENT
Start: 2021-06-16 | End: 2021-06-19 | Stop reason: HOSPADM

## 2021-06-16 RX ORDER — OXYTOCIN/0.9 % SODIUM CHLORIDE 30/500 ML
250 PLASTIC BAG, INJECTION (ML) INTRAVENOUS CONTINUOUS
Status: ACTIVE | OUTPATIENT
Start: 2021-06-16 | End: 2021-06-16

## 2021-06-16 RX ORDER — TRISODIUM CITRATE DIHYDRATE AND CITRIC ACID MONOHYDRATE 500; 334 MG/5ML; MG/5ML
15 SOLUTION ORAL ONCE
Status: COMPLETED | OUTPATIENT
Start: 2021-06-16 | End: 2021-06-16

## 2021-06-16 RX ADMIN — DEXAMETHASONE SODIUM PHOSPHATE 4 MG: 4 INJECTION, SOLUTION INTRAMUSCULAR; INTRAVENOUS at 08:26

## 2021-06-16 RX ADMIN — SCOPALAMINE 1 PATCH: 1 PATCH, EXTENDED RELEASE TRANSDERMAL at 07:35

## 2021-06-16 RX ADMIN — FAMOTIDINE 20 MG: 10 INJECTION INTRAVENOUS at 07:13

## 2021-06-16 RX ADMIN — METRONIDAZOLE 500 MG: 500 INJECTION, SOLUTION INTRAVENOUS at 10:34

## 2021-06-16 RX ADMIN — EPHEDRINE SULFATE 25 MG: 50 INJECTION INTRAVENOUS at 08:08

## 2021-06-16 RX ADMIN — HYDROMORPHONE HYDROCHLORIDE 100 MCG: 1 INJECTION, SOLUTION INTRAMUSCULAR; INTRAVENOUS; SUBCUTANEOUS at 08:05

## 2021-06-16 RX ADMIN — OXYCODONE HYDROCHLORIDE AND ACETAMINOPHEN 1 TABLET: 7.5; 325 TABLET ORAL at 14:01

## 2021-06-16 RX ADMIN — SODIUM CHLORIDE, POTASSIUM CHLORIDE, SODIUM LACTATE AND CALCIUM CHLORIDE 125 ML/HR: 600; 310; 30; 20 INJECTION, SOLUTION INTRAVENOUS at 05:45

## 2021-06-16 RX ADMIN — EPHEDRINE SULFATE 25 MG: 50 INJECTION INTRAVENOUS at 08:12

## 2021-06-16 RX ADMIN — SODIUM CITRATE AND CITRIC ACID MONOHYDRATE 15 ML: 500; 334 SOLUTION ORAL at 07:13

## 2021-06-16 RX ADMIN — HYDROMORPHONE HYDROCHLORIDE 500 MCG: 1 INJECTION, SOLUTION INTRAMUSCULAR; INTRAVENOUS; SUBCUTANEOUS at 08:31

## 2021-06-16 RX ADMIN — OXYTOCIN 10 UNITS: 10 INJECTION, SOLUTION INTRAMUSCULAR; INTRAVENOUS at 08:27

## 2021-06-16 RX ADMIN — BUTORPHANOL TARTRATE 0.5 MG: 1 INJECTION, SOLUTION INTRAMUSCULAR; INTRAVENOUS at 10:27

## 2021-06-16 RX ADMIN — SODIUM CHLORIDE, POTASSIUM CHLORIDE, SODIUM LACTATE AND CALCIUM CHLORIDE 125 ML/HR: 600; 310; 30; 20 INJECTION, SOLUTION INTRAVENOUS at 13:30

## 2021-06-16 RX ADMIN — IBUPROFEN 800 MG: 800 TABLET, FILM COATED ORAL at 20:41

## 2021-06-16 RX ADMIN — SODIUM CHLORIDE, PRESERVATIVE FREE 3 ML: 5 INJECTION INTRAVENOUS at 20:41

## 2021-06-16 RX ADMIN — HYDROMORPHONE HYDROCHLORIDE 400 MCG: 1 INJECTION, SOLUTION INTRAMUSCULAR; INTRAVENOUS; SUBCUTANEOUS at 08:35

## 2021-06-16 RX ADMIN — OXYTOCIN 20 UNITS: 10 INJECTION, SOLUTION INTRAMUSCULAR; INTRAVENOUS at 08:26

## 2021-06-16 RX ADMIN — METRONIDAZOLE 500 MG: 500 INJECTION, SOLUTION INTRAVENOUS at 16:25

## 2021-06-16 RX ADMIN — CEFEPIME HYDROCHLORIDE 2 G: 2 INJECTION, POWDER, FOR SOLUTION INTRAVENOUS at 13:30

## 2021-06-16 RX ADMIN — ONDANSETRON 8 MG: 2 INJECTION INTRAMUSCULAR; INTRAVENOUS at 08:26

## 2021-06-16 RX ADMIN — DOCUSATE SODIUM 50 MG AND SENNOSIDES 8.6 MG 1 TABLET: 8.6; 5 TABLET, FILM COATED ORAL at 20:41

## 2021-06-16 RX ADMIN — Medication 2 G: at 08:00

## 2021-06-16 RX ADMIN — CEFEPIME HYDROCHLORIDE 2 G: 2 INJECTION, POWDER, FOR SOLUTION INTRAVENOUS at 19:53

## 2021-06-16 RX ADMIN — PROMETHAZINE HYDROCHLORIDE 25 MG: 25 SUPPOSITORY RECTAL at 11:56

## 2021-06-16 NOTE — ANESTHESIA PROCEDURE NOTES
Spinal Block      Patient location during procedure: OR  Indication:procedure for pain  Performed By  CRNA: Vicente Galarza CRNA  Preanesthetic Checklist  Completed: patient identified, IV checked, site marked, risks and benefits discussed, surgical consent, monitors and equipment checked, pre-op evaluation and timeout performed  Spinal Block Prep:  Patient Position:sitting  Sterile Tech:cap, gloves, mask and sterile barriers  Prep:Chloraprep  Patient Monitoring:blood pressure monitoring, continuous pulse oximetry and EKG  Spinal Block Procedure  Approach:midline  Guidance:palpation technique  Needle Type:Quincke  Needle Gauge:25 G  Paresthesia: left  Fluid Appearance:clear     Post Assessment  Patient Tolerance:patient tolerated the procedure well with no apparent complications  Complications no

## 2021-06-16 NOTE — OP NOTE
Section Procedure Note    Indications: Previous , declines labor    Pre-operative Diagnosis: Previous , declines labor    Post-operative Diagnosis: Previous , declines labor    Planned Procedure:  Repeat     Surgeon: Priya Moreno MD     Assistants: None    Anesthesia: Spinal anesthesia    ASA Class: 2      Procedure Details     The risks, benefits, indications and alternatives of the procedure were reviewed with the patient and informed consent was obtained. The patient was taken to the Operating Room with an IV running. She was placed in the dorsal supine position, with a leftward tilt, then prepped and draped in the usual sterile fashion. Spinal anesthesia was then administrated without difficulty. A Pfannenstiel skin incision was made approximately 2cm above the symphysis pubis and extended down sharply to the rectus fascia. The fascia was then nicked in the midline and extended laterally. The muscles of the anterior abdominal wall were . The fascia was then grasped and the fascial incision extended laterally via superior and inferior traction. The peritoneum was next identified, and entered bluntly with the digits. The peritoneal incision was also extended via traction, taking care to note the position of the bladder. Next a bladder blade was inserted. A vesicouterine incision was made with the Metzembaum scissors, and a bladder flap gently created. The bladder blade was then reinserted. Next, the uterine incision was made with the scalpel, in transverse, elliptical fashion. The uterine incision was extended laterally via traction. The bladder blade was then removed, and the fetal vertex delivered through the uterine incision without difficulty. Nares and mouth were suctioned on the sterile field. The remainder of the infant was then delivered, and infant passed to the awaiting respiratory therapist. Next, the placenta was delivered manually, taking  care to remove all membranes. The uterus was then exteriorized, and cleared of all clot and debris. The uterine incision was closed with 0-Vicryl in running, locking fashion. Excellent hemostasis was noted. Copious irrigation of the posterior cul-de-sac was performed with warm, sterile saline. The uterus was returned to the abdominal cavity, and the gutters cleared of all clot and debris. Interceed was placed over the uterine incision and over the body of the uterus to prevent future adhesions. The fascia was then closed with 0-Vicryl in running fashion. Next Interceed was layered over the fascia to prevent future adhesions. The skin was subsequently closed with Stratafix suture in 2 layers. The skin was then dressed with a Mepilex gauze.    Findings:  VMI  In cephalic, OA position, clear,yes; Number of nuchal loops present:  2  nuchal cord. Manual extraction of  Normal placenta and cord. Apgars         APGARS  One minute Five minutes Ten minutes Fifteen minutes Twenty minutes   Skin color: 0   1             Heart rate: 2   2             Grimace: 2   2              Muscle tone: 2   2              Breathin   2              Totals: 8   9              , Weight: 3997 g (8 lb 13 oz)  Length: 20.5  in. NICU/Nursery Present.    Estimated Blood Loss:  800ml pre MD           Drains: 500ml           Total IV Fluids: 1100ml           Specimens: Placenta, cord blood           Implants: Interceed x 2           Complications:  None; patient tolerated the procedure well.           Disposition: PACU - hemodynamically stable.           Condition: stable    Attending Attestation: I performed the procedure.    Priya Moreno MD  2021  09:39 CDT

## 2021-06-16 NOTE — ANESTHESIA PREPROCEDURE EVALUATION
Anesthesia Evaluation     Patient summary reviewed   no history of anesthetic complications:  NPO Solid Status: > 8 hours  NPO Liquid Status: > 8 hours           Airway   Mallampati: I  TM distance: >3 FB  No difficulty expected  Dental - normal exam     Pulmonary - negative pulmonary ROS and normal exam   Cardiovascular - negative cardio ROS and normal exam  Exercise tolerance: good (4-7 METS)        Neuro/Psych  (+) headaches,     GI/Hepatic/Renal/Endo    (+) obesity,     (-) liver disease    Musculoskeletal (-) negative ROS    Abdominal  - normal exam   Substance History - negative use     OB/GYN    (+) Pregnant,         Other - negative ROS                       Anesthesia Plan    ASA 2     epidural       Anesthetic plan, all risks, benefits, and alternatives have been provided, discussed and informed consent has been obtained with: patient.

## 2021-06-16 NOTE — PLAN OF CARE
Goal Outcome Evaluation:  Plan of Care Reviewed With: patient, spouse        Progress: improving  Outcome Summary: Repeat C/S w/no BTL; Delivery at 0823; Boy; infant in NICU for transitioning; continuous nausea/emesis; pt rating pain a 3 on pain scale; Stadol 0.5mg given for breakthrough pain; Flagyl infusing; FF/ML/UU scant-light rubra; breastfeeding

## 2021-06-16 NOTE — LACTATION NOTE
Mother's Name: Andria Phone #:  Infant Name: Pramod :2019  Gestation:38w4d  Day of life:0  Birth weight:  8-13 (3997g) Discharge weight:  Weight Loss:   24 hour Summary of Feeds:  Voids:  Stools:  Assistive devices (shields, shells, etc):  Significant Maternal history: , Migraines, low milk supply with previous children  Maternal Concerns:  Low milk supply  Maternal Goal:   Mother's Medications: PNV  Breastpump for home: NEEDS RX  Ped follow up appt:    Infant transferred to NICU immediately after delivery due to respiratory distress, infant currently transitioning in nicu with cpap support. With mother's permisison, hand expression performed. Collected 4 ml EBM. Taken to nicu, labeled and left at bedside, ADT RN Tanisha Turner updated. Reassured mother of lactation team support/assistance during hospital stay. Encouragement and support provided. Will review education material at a later time.     1410  Infant remains in nicu, assisted mother with hand expression collected 2ml, transported to NICU and left at bedside with Malgorzata Robert RN. Instructed mother to pump at this time. It remains undecided if infant will be admitted to NICU or transfer out to [facility] with mother. Delaying education at this time until decision for infant made.     1815  Returned to room to review initial breastfeeding packet, also recommended mother to pump since infant recently received formula feeding. Mother agreeable, initiated pumping during pumping, infant brought back to room. NICU staff recommend feeding at this time. Assisted to move infant skin to skin. Infant latched in cradle hold, nursed well for 5 mins, swallows noted at breast. Unable to achieve latch once more, multiple positions attempted, moved infant into a modified football hold, infant nursed sleepily for 10 additional mins with occasional swallows noted. Achieved 12 mins of feeding on right breast. Discussed infant feeding chart, feeding frequency's,  voids, stools. Promote on demand feeding with infant cues. Also encouraged continued supplementation with formula as infant hunger cues persist after breastfeeding. Encouraged mother to pump after breastfeeding.   Report given to TRACEE Tobar for night shift, recommend monitoring infant's feedings/breathing over night, infant did periodically develop tachypnea during breastfeeding but would slow while at breast. Also to consider supplementation after every feeding due to increase workload for infant feedings at breast.     Instructed mom our lactation team is here for continued support throughout their breastfeeding journey. Our team has encouraged mom to call with any questions or concerns that may arise after discharge.

## 2021-06-16 NOTE — PLAN OF CARE
Goal Outcome Evaluation:              Outcome Summary: VSS.  Fundus and lochia WDL.  Nausea has improved.  Pain at level of tolerance per pt with PO pain medicine.  Baby still transitioning in NICU at this time.  Pumping for baby.

## 2021-06-17 LAB
BASOPHILS # BLD AUTO: 0.02 10*3/MM3 (ref 0–0.2)
BASOPHILS NFR BLD AUTO: 0.2 % (ref 0–1.5)
DEPRECATED RDW RBC AUTO: 49.9 FL (ref 37–54)
EOSINOPHIL # BLD AUTO: 0.07 10*3/MM3 (ref 0–0.4)
EOSINOPHIL NFR BLD AUTO: 0.6 % (ref 0.3–6.2)
ERYTHROCYTE [DISTWIDTH] IN BLOOD BY AUTOMATED COUNT: 14.6 % (ref 12.3–15.4)
GLUCOSE BLDC GLUCOMTR-MCNC: 95 MG/DL (ref 70–130)
HCT VFR BLD AUTO: 25 % (ref 34–46.6)
HGB BLD-MCNC: 8 G/DL (ref 12–15.9)
IMM GRANULOCYTES # BLD AUTO: 0.06 10*3/MM3 (ref 0–0.05)
IMM GRANULOCYTES NFR BLD AUTO: 0.5 % (ref 0–0.5)
LYMPHOCYTES # BLD AUTO: 1.36 10*3/MM3 (ref 0.7–3.1)
LYMPHOCYTES NFR BLD AUTO: 11.9 % (ref 19.6–45.3)
MCH RBC QN AUTO: 30.2 PG (ref 26.6–33)
MCHC RBC AUTO-ENTMCNC: 32 G/DL (ref 31.5–35.7)
MCV RBC AUTO: 94.3 FL (ref 79–97)
MONOCYTES # BLD AUTO: 1.09 10*3/MM3 (ref 0.1–0.9)
MONOCYTES NFR BLD AUTO: 9.5 % (ref 5–12)
NEUTROPHILS NFR BLD AUTO: 77.3 % (ref 42.7–76)
NEUTROPHILS NFR BLD AUTO: 8.82 10*3/MM3 (ref 1.7–7)
NRBC BLD AUTO-RTO: 0 /100 WBC (ref 0–0.2)
PLATELET # BLD AUTO: 139 10*3/MM3 (ref 140–450)
PMV BLD AUTO: 10.6 FL (ref 6–12)
RBC # BLD AUTO: 2.65 10*6/MM3 (ref 3.77–5.28)
WBC # BLD AUTO: 11.42 10*3/MM3 (ref 3.4–10.8)

## 2021-06-17 PROCEDURE — 82962 GLUCOSE BLOOD TEST: CPT

## 2021-06-17 PROCEDURE — 85025 COMPLETE CBC W/AUTO DIFF WBC: CPT | Performed by: OBSTETRICS & GYNECOLOGY

## 2021-06-17 PROCEDURE — 25010000002 IRON SUCROSE PER 1 MG: Performed by: OBSTETRICS & GYNECOLOGY

## 2021-06-17 PROCEDURE — 25010000002 CEFEPIME PER 500 MG: Performed by: OBSTETRICS & GYNECOLOGY

## 2021-06-17 PROCEDURE — 63710000001 PROMETHAZINE PER 25 MG: Performed by: OBSTETRICS & GYNECOLOGY

## 2021-06-17 RX ADMIN — SODIUM CHLORIDE, POTASSIUM CHLORIDE, SODIUM LACTATE AND CALCIUM CHLORIDE 125 ML/HR: 600; 310; 30; 20 INJECTION, SOLUTION INTRAVENOUS at 04:38

## 2021-06-17 RX ADMIN — OXYCODONE HYDROCHLORIDE AND ACETAMINOPHEN 1 TABLET: 5; 325 TABLET ORAL at 22:05

## 2021-06-17 RX ADMIN — IBUPROFEN 800 MG: 800 TABLET, FILM COATED ORAL at 14:03

## 2021-06-17 RX ADMIN — CEFEPIME HYDROCHLORIDE 2 G: 2 INJECTION, POWDER, FOR SOLUTION INTRAVENOUS at 09:39

## 2021-06-17 RX ADMIN — OXYCODONE HYDROCHLORIDE AND ACETAMINOPHEN 1 TABLET: 7.5; 325 TABLET ORAL at 05:50

## 2021-06-17 RX ADMIN — PROMETHAZINE HYDROCHLORIDE 25 MG: 25 TABLET ORAL at 15:26

## 2021-06-17 RX ADMIN — PRENATAL VIT W/ FE FUMARATE-FA TAB 27-0.8 MG 1 TABLET: 27-0.8 TAB at 09:28

## 2021-06-17 RX ADMIN — OXYCODONE HYDROCHLORIDE AND ACETAMINOPHEN 1 TABLET: 7.5; 325 TABLET ORAL at 09:46

## 2021-06-17 RX ADMIN — METRONIDAZOLE 500 MG: 500 INJECTION, SOLUTION INTRAVENOUS at 05:41

## 2021-06-17 RX ADMIN — IBUPROFEN 800 MG: 800 TABLET, FILM COATED ORAL at 05:50

## 2021-06-17 RX ADMIN — IRON SUCROSE 300 MG: 20 INJECTION, SOLUTION INTRAVENOUS at 09:39

## 2021-06-17 RX ADMIN — IBUPROFEN 800 MG: 800 TABLET, FILM COATED ORAL at 22:06

## 2021-06-17 RX ADMIN — OXYCODONE HYDROCHLORIDE AND ACETAMINOPHEN 1 TABLET: 5; 325 TABLET ORAL at 17:52

## 2021-06-17 RX ADMIN — DOCUSATE SODIUM 50 MG AND SENNOSIDES 8.6 MG 1 TABLET: 8.6; 5 TABLET, FILM COATED ORAL at 09:28

## 2021-06-17 RX ADMIN — DOCUSATE SODIUM 50 MG AND SENNOSIDES 8.6 MG 1 TABLET: 8.6; 5 TABLET, FILM COATED ORAL at 20:51

## 2021-06-17 RX ADMIN — METRONIDAZOLE 500 MG: 500 INJECTION, SOLUTION INTRAVENOUS at 00:18

## 2021-06-17 RX ADMIN — SODIUM CHLORIDE, PRESERVATIVE FREE 3 ML: 5 INJECTION INTRAVENOUS at 09:42

## 2021-06-17 RX ADMIN — OXYCODONE HYDROCHLORIDE AND ACETAMINOPHEN 1 TABLET: 5; 325 TABLET ORAL at 14:03

## 2021-06-17 RX ADMIN — OXYCODONE HYDROCHLORIDE AND ACETAMINOPHEN 1 TABLET: 7.5; 325 TABLET ORAL at 00:31

## 2021-06-17 RX ADMIN — SODIUM CHLORIDE, PRESERVATIVE FREE 3 ML: 5 INJECTION INTRAVENOUS at 20:51

## 2021-06-17 NOTE — LACTATION NOTE
Mother's Name: Andria Phone #:  Infant Name: Pramod :2021  Gestation:38w4d  Day of life:1  Birth weight:  8-13 (3997g) Discharge weight:  Weight Loss:   24 hour Summary of Feeds: BF x1   EBM  6ml  Formula x3 Voids:  Stools:  Assistive devices (shields, shells, etc):  Significant Maternal history: , Migraines, low milk supply with previous children  Maternal Concerns:  Low milk supply  Maternal Goal:   Mother's Medications: PNV  Breastpump for home:  RX faxed  Ped follow up appt:    Infant admitted to nicu during the night due to tachypnea. Infant was placed npo. Mother continues to pump and is happy to report she is collecting more,  0.8 ml at bedside at current. Praise provided. Encouraged mother to continue pumping every 2-3 hours any collected EBM will be provided to infant. Provided additional syringes and bottles. Encouragement and support provided.     Instructed mom our lactation team is here for continued support throughout their breastfeeding journey. Our team has encouraged mom to call with any questions or concerns that may arise after discharge.

## 2021-06-17 NOTE — PROGRESS NOTES
"[facility]   Section Postpartum Delivery Progress Note    Subjective   Postpartum Day 1:  Delivery    The patient feels well.  Her pain is well controlled with prescribed pain medications.   She is ambulating well.  Patient describes her bleeding as moderate lochia.    Breastfeeding: declines. Pumping    Objective     Vital Signs Range for the last 24 hours  Temperature: Temp:  [97 °F (36.1 °C)-98.8 °F (37.1 °C)] 98 °F (36.7 °C)   Temp Source: Temp src: Temporal   BP: BP: (105-137)/(46-83) 107/46   Pulse: Heart Rate:  [] 72   Respirations: Resp:  [16-18] 18   SPO2: SpO2:  [95 %-100 %] 99 %   O2 Amount(L/min):     O2 Devices Device (Oxygen Therapy): room air   Weight:       Admit Height:  Height: 154.9 cm (61\")      Physical Exam:  General:  no acute distresss.  Abdomen: Fundus: appropriate, firm, non tender  Extremities: normal, atraumatic, no cyanosis, and trace edema.   Incision: clean, dry & intact    Lab results reviewed:  Yes   Rubella:  No results found for: RUBELLAIGGIN Nurse Transcribed from prenatal record --  No components found for: EXTRUBELQUAL  Rh Status:    RH type   Date Value Ref Range Status   2021 Positive  Final     Immunizations: There is no immunization history for the selected administration types on file for this patient.    Assessment/Plan        delivery delivered      Andria Ureña is Day 1  post-partum  , Low Transverse   .      Plan:  Continue current care.      Priya Moreno MD  2021  09:00 CDT  "

## 2021-06-18 PROCEDURE — 25010000002 IRON SUCROSE PER 1 MG: Performed by: OBSTETRICS & GYNECOLOGY

## 2021-06-18 RX ORDER — OXYCODONE HYDROCHLORIDE AND ACETAMINOPHEN 5; 325 MG/1; MG/1
1 TABLET ORAL EVERY 6 HOURS PRN
Status: DISCONTINUED | OUTPATIENT
Start: 2021-06-18 | End: 2021-06-19 | Stop reason: HOSPADM

## 2021-06-18 RX ORDER — BISACODYL 10 MG
10 SUPPOSITORY, RECTAL RECTAL DAILY PRN
Status: DISCONTINUED | OUTPATIENT
Start: 2021-06-18 | End: 2021-06-19 | Stop reason: HOSPADM

## 2021-06-18 RX ADMIN — IBUPROFEN 800 MG: 800 TABLET, FILM COATED ORAL at 19:24

## 2021-06-18 RX ADMIN — OXYCODONE HYDROCHLORIDE AND ACETAMINOPHEN 1 TABLET: 5; 325 TABLET ORAL at 19:18

## 2021-06-18 RX ADMIN — DOCUSATE SODIUM 50 MG AND SENNOSIDES 8.6 MG 1 TABLET: 8.6; 5 TABLET, FILM COATED ORAL at 08:35

## 2021-06-18 RX ADMIN — SODIUM CHLORIDE, PRESERVATIVE FREE 3 ML: 5 INJECTION INTRAVENOUS at 10:56

## 2021-06-18 RX ADMIN — IRON SUCROSE 300 MG: 20 INJECTION, SOLUTION INTRAVENOUS at 10:56

## 2021-06-18 RX ADMIN — OXYCODONE HYDROCHLORIDE AND ACETAMINOPHEN 1 TABLET: 5; 325 TABLET ORAL at 02:13

## 2021-06-18 RX ADMIN — DOCUSATE SODIUM 50 MG AND SENNOSIDES 8.6 MG 1 TABLET: 8.6; 5 TABLET, FILM COATED ORAL at 20:12

## 2021-06-18 RX ADMIN — IBUPROFEN 800 MG: 800 TABLET, FILM COATED ORAL at 06:19

## 2021-06-18 RX ADMIN — BISACODYL 10 MG: 10 SUPPOSITORY RECTAL at 14:07

## 2021-06-18 RX ADMIN — PRENATAL VIT W/ FE FUMARATE-FA TAB 27-0.8 MG 1 TABLET: 27-0.8 TAB at 08:35

## 2021-06-18 RX ADMIN — SODIUM CHLORIDE, PRESERVATIVE FREE 3 ML: 5 INJECTION INTRAVENOUS at 20:12

## 2021-06-18 RX ADMIN — OXYCODONE AND ACETAMINOPHEN 1 TABLET: 325; 10 TABLET ORAL at 06:36

## 2021-06-18 RX ADMIN — OXYCODONE HYDROCHLORIDE AND ACETAMINOPHEN 1 TABLET: 5; 325 TABLET ORAL at 12:56

## 2021-06-18 NOTE — LACTATION NOTE
This note was copied from a baby's chart.  Name: Pramod  Day: 2  Dx: Transient Tachypnea   Birth Gestation: 39w4d  Birth weight: 8-13 (3997g)  Last weight: 8-6.4 (3810g)            % of weight loss: -4.69%    Feeding Orders: 25 ml MBM or Similac Advance  Maternal Hx: , Migraines, low supply with previous children  Prenatal Medications: PNV  Pump available: Yes + manual   Pumping history in the last 24 hours:     Mother reports pumping to be going well. States she last collected 6 ml. Mother desires to eventually breastfeed exclusively. She is hoping her supply will better better this time. Praise provided. Discussed interventions to increase supply. Lactation support offered. Mother voiced appreciation. Questions/concerns denied.      1720  Mother called for assistance as her breasts were engorged. Mother pumping using manual pump and 24 mm flange upon visit. Bilateral breasts firm with knots. Assisted with reverse pressure softening, breast massage/hand expression, and pumping. Used hospital grade pump and 27 mm flanges (size more appropriate). Breasts softened significantly. Discussed milk supply and interventions to increase supply. Reiterated the need to clean pump parts after each use and encouraged to steam sterilize every 24 hours. Steam bag provided. Recommended mother power pump until breasts are completely soft. Mother appreciative. No further needs at this time.

## 2021-06-18 NOTE — PROGRESS NOTES
"[facility]   Section Postpartum Delivery Progress Note    Subjective   Postpartum Day 2:  Delivery    The patient feels well.  Her pain is well controlled with prescribed pain medications.   She is ambulating well.  Patient describes her bleeding as moderate lochia. Complained of weakness starting last night. Vitals stable, good UOP. Anemic after surgery on venofer.     Breastfeeding: without difficulty.    Objective     Vital Signs Range for the last 24 hours  Temperature: Temp:  [98.3 °F (36.8 °C)-99 °F (37.2 °C)] 98.3 °F (36.8 °C)   Temp Source: Temp src: Oral   BP: BP: (112-125)/(54-65) 121/58   Pulse: Heart Rate:  [95-98] 98   Respirations: Resp:  [18-21] 20   SPO2: SpO2:  [97 %-99 %] 98 %   O2 Amount(L/min):     O2 Devices Device (Oxygen Therapy): room air   Weight:       Admit Height:  Height: 154.9 cm (61\")      Physical Exam:  General:  no acute distresss.  Abdomen: Fundus: appropriate, firm, non tender  Extremities: normal, atraumatic, no cyanosis, and trace edema.   Incision: clean, dry & intact    Lab results reviewed:  Yes   Rubella:  No results found for: RUBELLAIGGIN Nurse Transcribed from prenatal record --  No components found for: EXTRUBELQUAL  Rh Status:    RH type   Date Value Ref Range Status   2021 Positive  Final     Immunizations: There is no immunization history for the selected administration types on file for this patient.    Assessment/Plan        delivery delivered      Andria Ureña is Day 2  post-partum  , Low Transverse   .      Plan:  Continue current care.      Priya Moreno MD  2021  10:03 CDT  "

## 2021-06-18 NOTE — PLAN OF CARE
Problem: Adult Inpatient Plan of Care  Goal: Plan of Care Review  Outcome: Ongoing, Progressing  Flowsheets  Taken 6/18/2021 1413 by Samia Chaudhry, RN  Progress: no change  Outcome Summary: VSS, fundus firm, midline U1, lochia scant, voiding passing some flatus, Dr. Moreno instructed pt to ambulate in emerson more frequently and decreased po pain medication, dulcolax supp. given related to gas pain, abd softly distented bowel sound active x4 quadrant, continue to receive Venofer, pumping and going to visit infant in NICU  Taken 6/18/2021 0727 by Ekaterina Choi, RN  Plan of Care Reviewed With:   patient   spouse   Goal Outcome Evaluation:           Progress: no change  Outcome Summary: VSS, fundus firm, midline U1, lochia scant, voiding passing some flatus, Dr. Moreno instructed pt to ambulate in emerson more frequently and decreased po pain medication, dulcolax supp. given related to gas pain, abd softly distented bowel sound active x4 quadrant, continue to receive Venofer, pumping and going to visit infant in NICU

## 2021-06-18 NOTE — PLAN OF CARE
Goal Outcome Evaluation:  Plan of Care Reviewed With: patient, spouse        Progress: improving  Outcome Summary: VSS. FFMLUU scant lochia, no clots noted. Voiding and ambulating, weak at times. Has rested on/off during this shift. Has not visited NICU due to weakness, MD aware. Pt c/o gas discomfort, improves w/ ambulation. Passing flatus, ALT w/ prineo, c/d/i. Pain controlled w/ PO Percocet and Motrin. Pumping occasionally. Will continue care.

## 2021-06-19 VITALS
OXYGEN SATURATION: 97 % | TEMPERATURE: 98.3 F | HEIGHT: 61 IN | HEART RATE: 98 BPM | BODY MASS INDEX: 33.87 KG/M2 | RESPIRATION RATE: 20 BRPM | SYSTOLIC BLOOD PRESSURE: 120 MMHG | WEIGHT: 179.4 LBS | DIASTOLIC BLOOD PRESSURE: 66 MMHG

## 2021-06-19 PROCEDURE — 94799 UNLISTED PULMONARY SVC/PX: CPT

## 2021-06-19 RX ORDER — OXYCODONE HYDROCHLORIDE AND ACETAMINOPHEN 5; 325 MG/1; MG/1
1 TABLET ORAL EVERY 6 HOURS PRN
Qty: 40 TABLET | Refills: 0 | Status: SHIPPED | OUTPATIENT
Start: 2021-06-19 | End: 2022-11-30

## 2021-06-19 RX ORDER — IBUPROFEN 800 MG/1
800 TABLET ORAL EVERY 8 HOURS PRN
Qty: 90 TABLET | Refills: 2 | OUTPATIENT
Start: 2021-06-19 | End: 2022-09-09

## 2021-06-19 RX ADMIN — OXYCODONE HYDROCHLORIDE AND ACETAMINOPHEN 1 TABLET: 5; 325 TABLET ORAL at 07:43

## 2021-06-19 RX ADMIN — IBUPROFEN 800 MG: 800 TABLET, FILM COATED ORAL at 03:23

## 2021-06-19 RX ADMIN — PRENATAL VIT W/ FE FUMARATE-FA TAB 27-0.8 MG 1 TABLET: 27-0.8 TAB at 07:44

## 2021-06-19 RX ADMIN — OXYCODONE HYDROCHLORIDE AND ACETAMINOPHEN 1 TABLET: 5; 325 TABLET ORAL at 01:32

## 2021-06-19 RX ADMIN — IBUPROFEN 800 MG: 800 TABLET, FILM COATED ORAL at 12:41

## 2021-06-19 RX ADMIN — DOCUSATE SODIUM 50 MG AND SENNOSIDES 8.6 MG 1 TABLET: 8.6; 5 TABLET, FILM COATED ORAL at 07:43

## 2021-06-19 RX ADMIN — SODIUM CHLORIDE, PRESERVATIVE FREE 3 ML: 5 INJECTION INTRAVENOUS at 12:41

## 2021-06-19 RX ADMIN — OXYCODONE HYDROCHLORIDE AND ACETAMINOPHEN 1 TABLET: 5; 325 TABLET ORAL at 16:13

## 2021-06-19 NOTE — DISCHARGE SUMMARY
Discharge Summary     Dylan Ureña  : 1985  MRN: [id number]  [facility]: [id number]    Date of Admission: 2021   Date of Discharge:  2021   Delivering Physician: Priya Moreno        Admission Diagnosis: 1.  delivery delivered [O82]   Discharge Diagnosis: 1. Pregnancy at 39w4d - delivered       Procedures: 2021  - , Low Transverse       Hospital Course  Patient is a 35 y.o.  who at 39w4d had a  section.  Her postoperative course was without complications.  On POD #2 she was ready for discharge.  She had normal lochia and pain well controlled with oral medications.    Infant  male  fetus weighing 3997 g (8 lb 13 oz)   Apgars -  8 @ 1 minute /  9 @ 5 minutes.    Discharge labs  Lab Results   Component Value Date    WBC 11.42 (H) 2021    HGB 8.0 (L) 2021    HCT 25.0 (L) 2021     (L) 2021       Discharge Medications     Discharge Medications      New Medications      Instructions Start Date   ibuprofen 800 MG tablet  Commonly known as: ADVIL,MOTRIN   800 mg, Oral, Every 8 Hours PRN      oxyCODONE-acetaminophen 5-325 MG per tablet  Commonly known as: PERCOCET   1 tablet, Oral, Every 6 Hours PRN         Continue These Medications      Instructions Start Date   ondansetron ODT 8 MG disintegrating tablet  Commonly known as: ZOFRAN-ODT   DISSOLVE 1 TABLET IN MOUTH ONCE DAILY FOR 30 DAYS      PrePLUS 27-1 MG tablet   1 tablet, Oral, Daily      promethazine 25 MG tablet  Commonly known as: PHENERGAN   TAKE 1 TABLET BY MOUTH ONCE DAILY AS NEEDED FOR NAUSEA AND VOMITING FOR 30 DAYS      Theanine 50 MG tablet dispersible   1 tablet, Oral, Daily             Discharge Disposition Home or Self Care   Condition on Discharge: good   Follow-up: 2 weeks with MD Priya Gomez MD  2021

## 2021-06-19 NOTE — PLAN OF CARE
Goal Outcome Evaluation:  Plan of Care Reviewed With: patient        Progress: improving  Outcome Summary: vitals stasble, fundus firm, 1u ,lochia samll pain at level of tolerance when taking pain medication. ambulating in emerson to nicu. bonding with infant. pumping breast often. low tranverse incision dry and intact.

## 2021-06-19 NOTE — LACTATION NOTE
This note was copied from a baby's chart.  Name: Pramod  Day: 3  Dx: Transient Tachypnea   Birth Gestation: 39w4d  Birth weight: 8-13 (3997g)  Last weight: 8-6.8 (3820g)            % of weight loss: -4.43%    Feeding Orders: 25 ml MBM or Similac Advance  Maternal Hx: , Migraines, low supply with previous children  Prenatal Medications: PNV  Pump available: Yes + manual   Pumping history in the last 24 hours: Pumping every 2-3 hours    Mother reports breasts are feeling better, she is now collecting more, last collecting almost 3 ounces. Praise and encouragement provided. Lactation support offered. Mother denied questions, concerns, or any needs at this time.

## 2021-06-19 NOTE — PROGRESS NOTES
"[facility]   Section Postpartum Delivery Progress Note    Subjective   Postpartum Day 3:  Delivery    The patient feels well.  Her pain is well controlled with prescribed pain medications.   She is ambulating well.  Patient describes her bleeding as thin lochia.    Breastfeeding: without difficulty.    Objective     Vital Signs Range for the last 24 hours  Temperature: Temp:  [97 °F (36.1 °C)-98.3 °F (36.8 °C)] 98.3 °F (36.8 °C)   Temp Source: Temp src: Oral   BP: BP: (101-133)/(49-67) 120/66   Pulse: Heart Rate:  [] 98   Respirations: Resp:  [16-20] 20   SPO2: SpO2:  [97 %-100 %] 97 %   O2 Amount(L/min):     O2 Devices Device (Oxygen Therapy): room air   Weight:       Admit Height:  Height: 154.9 cm (61\")      Physical Exam:  General:  no acute distresss.  Abdomen: Fundus: appropriate, firm, non tender  Extremities: normal, atraumatic, no cyanosis, and trace edema.   Incision: clean, dry & intact    Lab results reviewed:  Yes   Rubella:  No results found for: RUBELLAIGGIN Nurse Transcribed from prenatal record --  No components found for: EXTRUBELQUAL  Rh Status:  No results found for: RH  Immunizations: There is no immunization history for the selected administration types on file for this patient.    Assessment/Plan        delivery delivered      Andria Ureña is Day 3  post-partum  , Low Transverse   .      Plan:  Discharge home with standard precautions and return to clinic in 4-6 weeks.      Priya Moreno MD  2021  14:21 CDT  "

## 2021-06-21 NOTE — PAYOR COMM NOTE
"[facility]# [id number]                          DISCHARGE NOTIFICATION  2021  PATIENT DC'D HOME.....   INFANT REMAINS IN NICU    THANKS, WALESKA            [facility]/           P: [phone]          F: [phone]    Deedee Ureña (35 y.o. Female)     Date of Birth Social Security Number Address Home Phone MRN    1985  [address] [facility] [address] [phone] [id number]    Buddhism Marital Status          Congregation        Admission Date Admission Type Admitting Provider Attending Provider Department, Room/Bed    21 Elective Priya Moreno MD  [facility] MOTHER BABY 2A, M221/    Discharge Date Discharge Disposition Discharge Destination        2021 Home or Self Care              Attending Provider: (none)   Allergies: No Known Allergies    Isolation: None   Infection: COVID (History) (21)   Code Status: Prior    Ht: 154.9 cm (61\")   Wt: 81.4 kg (179 lb 6.4 oz)    Admission Cmt: None   Principal Problem:  delivery delivered [O82]                 Active Insurance as of 2021     Primary Coverage     Payor Plan Insurance Group Employer/Plan Group    [employer]HE[employer] MEDICAID [employer] MEDICAID [id number]     Payor Plan Address Payor Plan Phone Number Payor Plan Fax Number Effective Dates    [address] [address] [phone]  3/1/2020 - None Entered    [facility] [id number]       Subscriber Name Subscriber Birth Date Member ID       DEEDEE UREÑA 1985 [id number]                 Emergency Contacts      (Rel.) Home Phone Work Phone Mobile Phone    James Ureña (Spouse) -- -- [phone]               Discharge Summary      Priya Moreno MD at 21 1426          Discharge Summary     Dylan Ureña  : 1985  MRN: [id number]  CSN: [id number]    Date of Admission: 2021   Date of Discharge:  2021   Delivering Physician: Priya Moreno        Admission Diagnosis: " 1.  delivery delivered [O82]   Discharge Diagnosis: 1. Pregnancy at 39w4d - delivered       Procedures: 2021  - , Low Transverse       Hospital Course  Patient is a 35 y.o.  who at 39w4d had a  section.  Her postoperative course was without complications.  On POD #2 she was ready for discharge.  She had normal lochia and pain well controlled with oral medications.    Infant  male  fetus weighing 3997 g (8 lb 13 oz)   Apgars -  8 @ 1 minute /  9 @ 5 minutes.    Discharge labs  Lab Results   Component Value Date    WBC 11.42 (H) 2021    HGB 8.0 (L) 2021    HCT 25.0 (L) 2021     (L) 2021       Discharge Medications     Discharge Medications      New Medications      Instructions Start Date   ibuprofen 800 MG tablet  Commonly known as: ADVIL,MOTRIN   800 mg, Oral, Every 8 Hours PRN      oxyCODONE-acetaminophen 5-325 MG per tablet  Commonly known as: PERCOCET   1 tablet, Oral, Every 6 Hours PRN         Continue These Medications      Instructions Start Date   ondansetron ODT 8 MG disintegrating tablet  Commonly known as: ZOFRAN-ODT   DISSOLVE 1 TABLET IN MOUTH ONCE DAILY FOR 30 DAYS      PrePLUS 27-1 MG tablet   1 tablet, Oral, Daily      promethazine 25 MG tablet  Commonly known as: PHENERGAN   TAKE 1 TABLET BY MOUTH ONCE DAILY AS NEEDED FOR NAUSEA AND VOMITING FOR 30 DAYS      Theanine 50 MG tablet dispersible   1 tablet, Oral, Daily             Discharge Disposition Home or Self Care   Condition on Discharge: good   Follow-up: 2 weeks with MD Priya Gomez MD  2021    Electronically signed by Priya Moreno MD at 21 9707

## 2021-06-24 ENCOUNTER — HOSPITAL ENCOUNTER (OUTPATIENT)
Dept: LACTATION | Facility: HOSPITAL | Age: 36
Discharge: HOME | End: 2021-06-24

## 2021-06-24 LAB
CYTO UR: NORMAL
LAB AP CASE REPORT: NORMAL
LAB AP CLINICAL INFORMATION: NORMAL
PATH REPORT.FINAL DX SPEC: NORMAL
PATH REPORT.GROSS SPEC: NORMAL

## 2021-06-24 NOTE — LACTATION NOTE
"Mother's Name:  Andria Ureña    G/P:    6/4  Breastfeeding Hx:  Low supply with other 3 children; 7 months maximum  Significant Medical History: Denies:  Thyroid dx, PCOS, breast surgeries/injuries, HTN,  Maternal Breast Assessment:  Bilateral fullness, no nipple trauma    Infant's Name:  Pramod Ureña  Date of Birth:   [birth year]  Gestational age at Birth: 39w4d  Age:    8 days  Physician:   Dr. Bean                     Reason for Visit:  Weight check          Infant's Birth weight:  8-13 93209 g  Previous Weight:  8-6.8 3820 g  Wt Loss: 4.4 %    Today's Weight:    8-9.9 3910 g   Wt Loss:  1.4%    Feeding History Since Discharge/Last Lactation Appt.: Mom attempts bfing for approx 5 mins,then gives EBM per bottle, 2 1/2 -3 oz. She then pumps every 3 hours for 45-60 mins, obtaining 5 oz. She desires exc , at-breast, feeding. Says infant \"won't latch.\"  Cries, thrashes, arches at breast.     Past 24 Hours Voids/Stools:   6+ / 6-8     Color of Stool:  yellow           Left Breast:      10 mins 8-9.9 3910 g  +32 mls              Right Breast:    5 mins 8-12.1 3971 g  + 29 mls                       Total Minutes:    15         Total Weight Gain:     61     gms WONDERFUL!    Average Feeding Amount for Age: 2-3 OUNCES OR 60-90 MLS 8-12 times in 24 hours.     Interventions: Observed bfing session. Mother crouching over infant, who was curled into her elbow. Mom on tips of toes, straining to make a lap for infant. Provided footstool, which helped. Assisted mother with relaxing body, opening her arms and arms of infant. Encouraged bringing Pramod to her, spreading his arms around her breast, holding infant around nape of neck with hand, and keeping his cheeks/chin pressed into breast once latched, noting that this is preferable than merely providing a platform of support with forearm, and expecting him to latch himself.  Educated on how keeping Pramod close into breast is what is needed. Taught how to gently,but assertively do " "this. Also gave a few mls EBM into cheek while partially latched, in case this is needed when at home. Mother latched shallowly a few times and voiced \"It pinches.\"  When latched deeply, she confirmed she felt no pain. Pre/post weights obtained.     Education: video on latching, [employer], general amount of time needed at breast for a full feeding, and time needed to adequately remove milk with a pump.    Feeding Plan:   Feed at first cues.  Pump for 10 mins after bfing to keep supply high.  If no bfing first, pump for 20 mins.   Practice latching each time using the 3-second rule and not pressing on Pramod's crown. Remember to keep hand on nape of his neck and squeeze breast.  Always break seal before removing nipple.    Plan of Care:    Interventions accomplished satisfactorily, requires no further action.    Future Appointments:    Lactation: As desired    Physician: Dr. Fatuma Bean --Today    Signature: Audelia Pittman IBCLC    Date:  6/24/21      "

## 2021-10-07 ENCOUNTER — HOSPITAL ENCOUNTER (OUTPATIENT)
Dept: LACTATION | Facility: HOSPITAL | Age: 36
Discharge: HOME | End: 2021-10-07

## 2021-10-07 NOTE — LACTATION NOTE
"Pramod  4 months    Mother here to confirm whether pain while bfing is poor latch or thrush.     She was recently on antibiotics for mastitis. She has unresolved plugged milk ducts longer than 2 days which precipitated infection. Educated on how to clear same. She says she massaged area (LUOQ), dangle-fed and used warmth prior to bfing, cabbage leaves afterward. Pt currently on a probiotic.     Breast exam:  No engorgement or plugged ducts. Milk easily expressed. No nipple trauma. Nipples very pink. Complexion is natural ivory. Pt reports nipples are much \"pinker\" than normal. She notes they have shiny flaky skin at times though none observable today. She describes pain as restricted to nipple only, sometimes more intense after a feed, sometimes very painful vs not as painful. Pain is sharp like pins/needles.  No white spots observable in infant's mouth.     Thrush education given:  S/s/tx of same, noting Nystatin is often prescribed, but Diflucan is known to be more effective per studies conducted. Suggested she procure tx for both infant and herself simultaneously, obtain oral as well as topical tx, specifically APNO. Handouts on all given. Discussed how to prohibit further spread by killing yeast in vectors daily with hot water and being aware that sexual partner can harbor yeast. Suggested avoiding foods/drinks which promote growth of same. Pt to see Ob/Gyn today.     Infant fed at breast. No issues with latch. He self-latched deeply using breast pillow. Bobbed on/off breast often. Has reached \"distracted nurser\" stage. Educated on this. He tranfsered 20 mls at breast in 8 mins. He had bf'ed just prior to appmt. Also copied for mother pages from BFing and Human Lactation on Thrush. Pt appreciative of all.   "

## 2021-10-08 ENCOUNTER — TELEPHONE (OUTPATIENT)
Dept: LACTATION | Facility: HOSPITAL | Age: 36
End: 2021-10-08

## 2021-10-08 NOTE — TELEPHONE ENCOUNTER
Andria previously called but lactation away from desk at time of call. She called 2A nurses station left message with Deisy EASLEY, requesting lactation phone consultation. Per TRACEE Olivas, Andria has concerns of a cold and requesting advice for medication to treat. Attempted to call Andria back but no answer and no voicemail set up .

## 2021-10-08 NOTE — TELEPHONE ENCOUNTER
"Andria calls to request advice for medication to take to treat a sinus infection/congestion. Informed Andria medications such as decongestants and antihistamines are contraindicated due to a \"drying\" effect it may have on milk supply. Also informed Andria that her OB has requested lactation staff to not give medication recommendations to her patients. Therefore recommended Andria to call Dr. Moreno for further medication recommendations to treat sinus congestion/infection. Did suggest more homeopathic treatment of use of neti pot for treatment of sinus congestion. Andria appreciative of lactation consult.   "

## 2022-03-12 ENCOUNTER — HOSPITAL ENCOUNTER (EMERGENCY)
Facility: HOSPITAL | Age: 37
Discharge: HOME | End: 2022-03-12
Admitting: EMERGENCY MEDICINE

## 2022-03-12 ENCOUNTER — APPOINTMENT (OUTPATIENT)
Dept: CT IMAGING | Facility: HOSPITAL | Age: 37
End: 2022-03-12

## 2022-03-12 VITALS
OXYGEN SATURATION: 100 % | RESPIRATION RATE: 16 BRPM | HEART RATE: 76 BPM | HEIGHT: 61 IN | SYSTOLIC BLOOD PRESSURE: 116 MMHG | TEMPERATURE: 98.2 F | DIASTOLIC BLOOD PRESSURE: 82 MMHG | WEIGHT: 148 LBS | BODY MASS INDEX: 27.94 KG/M2

## 2022-03-12 DIAGNOSIS — A08.0 ROTAVIRUS ENTERITIS: Primary | ICD-10-CM

## 2022-03-12 DIAGNOSIS — R19.7 DIARRHEA, UNSPECIFIED TYPE: ICD-10-CM

## 2022-03-12 DIAGNOSIS — N39.0 ACUTE UTI: ICD-10-CM

## 2022-03-12 LAB
ADV 40+41 DNA STL QL NAA+NON-PROBE: NOT DETECTED
ALBUMIN SERPL-MCNC: 4.4 G/DL (ref 3.5–5.2)
ALBUMIN/GLOB SERPL: 1.5 G/DL
ALP SERPL-CCNC: 109 U/L (ref 39–117)
ALT SERPL W P-5'-P-CCNC: 20 U/L (ref 1–33)
ANION GAP SERPL CALCULATED.3IONS-SCNC: 10 MMOL/L (ref 5–15)
AST SERPL-CCNC: 23 U/L (ref 1–32)
ASTRO TYP 1-8 RNA STL QL NAA+NON-PROBE: NOT DETECTED
BACTERIA UR QL AUTO: ABNORMAL /HPF
BASOPHILS # BLD AUTO: 0.01 10*3/MM3 (ref 0–0.2)
BASOPHILS NFR BLD AUTO: 0.2 % (ref 0–1.5)
BILIRUB SERPL-MCNC: 0.2 MG/DL (ref 0–1.2)
BILIRUB UR QL STRIP: NEGATIVE
BUN SERPL-MCNC: 9 MG/DL (ref 6–20)
BUN/CREAT SERPL: 17.6 (ref 7–25)
C CAYETANENSIS DNA STL QL NAA+NON-PROBE: NOT DETECTED
C COLI+JEJ+UPSA DNA STL QL NAA+NON-PROBE: NOT DETECTED
CALCIUM SPEC-SCNC: 9.4 MG/DL (ref 8.6–10.5)
CHLORIDE SERPL-SCNC: 104 MMOL/L (ref 98–107)
CLARITY UR: ABNORMAL
CO2 SERPL-SCNC: 25 MMOL/L (ref 22–29)
COLOR UR: YELLOW
CREAT SERPL-MCNC: 0.51 MG/DL (ref 0.57–1)
CRYPTOSP DNA STL QL NAA+NON-PROBE: NOT DETECTED
D-LACTATE SERPL-SCNC: 0.5 MMOL/L (ref 0.5–2)
DEPRECATED RDW RBC AUTO: 43.5 FL (ref 37–54)
E HISTOLYT DNA STL QL NAA+NON-PROBE: NOT DETECTED
EAEC PAA PLAS AGGR+AATA ST NAA+NON-PRB: NOT DETECTED
EC STX1+STX2 GENES STL QL NAA+NON-PROBE: NOT DETECTED
EGFRCR SERPLBLD CKD-EPI 2021: 125.8 ML/MIN/1.73
EOSINOPHIL # BLD AUTO: 0.1 10*3/MM3 (ref 0–0.4)
EOSINOPHIL NFR BLD AUTO: 2.3 % (ref 0.3–6.2)
EPEC EAE GENE STL QL NAA+NON-PROBE: NOT DETECTED
ERYTHROCYTE [DISTWIDTH] IN BLOOD BY AUTOMATED COUNT: 12.6 % (ref 12.3–15.4)
ETEC LTA+ST1A+ST1B TOX ST NAA+NON-PROBE: NOT DETECTED
G LAMBLIA DNA STL QL NAA+NON-PROBE: NOT DETECTED
GLOBULIN UR ELPH-MCNC: 2.9 GM/DL
GLUCOSE SERPL-MCNC: 98 MG/DL (ref 65–99)
GLUCOSE UR STRIP-MCNC: NEGATIVE MG/DL
HCG SERPL QL: NEGATIVE
HCT VFR BLD AUTO: 42.6 % (ref 34–46.6)
HGB BLD-MCNC: 14.1 G/DL (ref 12–15.9)
HGB UR QL STRIP.AUTO: ABNORMAL
HYALINE CASTS UR QL AUTO: ABNORMAL /LPF
IMM GRANULOCYTES # BLD AUTO: 0.01 10*3/MM3 (ref 0–0.05)
IMM GRANULOCYTES NFR BLD AUTO: 0.2 % (ref 0–0.5)
KETONES UR QL STRIP: NEGATIVE
LEUKOCYTE ESTERASE UR QL STRIP.AUTO: NEGATIVE
LYMPHOCYTES # BLD AUTO: 1.15 10*3/MM3 (ref 0.7–3.1)
LYMPHOCYTES NFR BLD AUTO: 26.1 % (ref 19.6–45.3)
MCH RBC QN AUTO: 31 PG (ref 26.6–33)
MCHC RBC AUTO-ENTMCNC: 33.1 G/DL (ref 31.5–35.7)
MCV RBC AUTO: 93.6 FL (ref 79–97)
MONOCYTES # BLD AUTO: 0.71 10*3/MM3 (ref 0.1–0.9)
MONOCYTES NFR BLD AUTO: 16.1 % (ref 5–12)
NEUTROPHILS NFR BLD AUTO: 2.43 10*3/MM3 (ref 1.7–7)
NEUTROPHILS NFR BLD AUTO: 55.1 % (ref 42.7–76)
NITRITE UR QL STRIP: NEGATIVE
NOROVIRUS GI+II RNA STL QL NAA+NON-PROBE: NOT DETECTED
NRBC BLD AUTO-RTO: 0 /100 WBC (ref 0–0.2)
P SHIGELLOIDES DNA STL QL NAA+NON-PROBE: NOT DETECTED
PH UR STRIP.AUTO: 5.5 [PH] (ref 5–8)
PLATELET # BLD AUTO: 218 10*3/MM3 (ref 140–450)
PMV BLD AUTO: 9.3 FL (ref 6–12)
POTASSIUM SERPL-SCNC: 3.9 MMOL/L (ref 3.5–5.2)
PROT SERPL-MCNC: 7.3 G/DL (ref 6–8.5)
PROT UR QL STRIP: NEGATIVE
RBC # BLD AUTO: 4.55 10*6/MM3 (ref 3.77–5.28)
RBC # UR STRIP: ABNORMAL /HPF
REF LAB TEST METHOD: ABNORMAL
RVA RNA STL QL NAA+NON-PROBE: DETECTED
S ENT+BONG DNA STL QL NAA+NON-PROBE: NOT DETECTED
SAPO I+II+IV+V RNA STL QL NAA+NON-PROBE: NOT DETECTED
SHIGELLA SP+EIEC IPAH ST NAA+NON-PROBE: NOT DETECTED
SODIUM SERPL-SCNC: 139 MMOL/L (ref 136–145)
SP GR UR STRIP: 1.03 (ref 1–1.03)
SQUAMOUS #/AREA URNS HPF: ABNORMAL /HPF
UROBILINOGEN UR QL STRIP: ABNORMAL
V CHOL+PARA+VUL DNA STL QL NAA+NON-PROBE: NOT DETECTED
V CHOLERAE DNA STL QL NAA+NON-PROBE: NOT DETECTED
WBC # UR STRIP: ABNORMAL /HPF
WBC NRBC COR # BLD: 4.41 10*3/MM3 (ref 3.4–10.8)
Y ENTEROCOL DNA STL QL NAA+NON-PROBE: NOT DETECTED

## 2022-03-12 PROCEDURE — 74177 CT ABD & PELVIS W/CONTRAST: CPT

## 2022-03-12 PROCEDURE — 80053 COMPREHEN METABOLIC PANEL: CPT | Performed by: NURSE PRACTITIONER

## 2022-03-12 PROCEDURE — 85025 COMPLETE CBC W/AUTO DIFF WBC: CPT | Performed by: NURSE PRACTITIONER

## 2022-03-12 PROCEDURE — 81001 URINALYSIS AUTO W/SCOPE: CPT | Performed by: NURSE PRACTITIONER

## 2022-03-12 PROCEDURE — 84703 CHORIONIC GONADOTROPIN ASSAY: CPT | Performed by: NURSE PRACTITIONER

## 2022-03-12 PROCEDURE — 99283 EMERGENCY DEPT VISIT LOW MDM: CPT

## 2022-03-12 PROCEDURE — 96374 THER/PROPH/DIAG INJ IV PUSH: CPT

## 2022-03-12 PROCEDURE — 25010000002 IOPAMIDOL 61 % SOLUTION: Performed by: NURSE PRACTITIONER

## 2022-03-12 PROCEDURE — 0097U HC BIOFIRE FILMARRAY GI PANEL: CPT | Performed by: NURSE PRACTITIONER

## 2022-03-12 PROCEDURE — 83605 ASSAY OF LACTIC ACID: CPT | Performed by: NURSE PRACTITIONER

## 2022-03-12 RX ORDER — DICYCLOMINE HCL 20 MG
20 TABLET ORAL EVERY 8 HOURS PRN
Qty: 20 TABLET | Refills: 0 | Status: SHIPPED | OUTPATIENT
Start: 2022-03-12 | End: 2022-11-30

## 2022-03-12 RX ORDER — CEFDINIR 300 MG/1
300 CAPSULE ORAL 2 TIMES DAILY
Qty: 20 CAPSULE | Refills: 0 | Status: SHIPPED | OUTPATIENT
Start: 2022-03-12 | End: 2022-03-22

## 2022-03-12 RX ORDER — FAMOTIDINE 10 MG/ML
20 INJECTION, SOLUTION INTRAVENOUS ONCE
Status: COMPLETED | OUTPATIENT
Start: 2022-03-12 | End: 2022-03-12

## 2022-03-12 RX ADMIN — IOPAMIDOL 100 ML: 612 INJECTION, SOLUTION INTRAVENOUS at 08:58

## 2022-03-12 RX ADMIN — SODIUM CHLORIDE 1000 ML: 9 INJECTION, SOLUTION INTRAVENOUS at 08:35

## 2022-03-12 RX ADMIN — FAMOTIDINE 20 MG: 10 INJECTION INTRAVENOUS at 08:36

## 2022-03-12 NOTE — DISCHARGE INSTRUCTIONS
Return to ER if symptoms worsen   Clear liquids only for 24 hours, then advance as tolerated  Follow up with one of the [facility] physician groups below to setup primary care. If you have trouble making an appointment, please call the [facility] Nurse Line at [phone]    Dr. Rosey Simpson DO, Dr. Mazin Escobar DO, and JOSEPH Colunga  [facility]  [address] [place], [place], [address] [phone]    Dr. Valentin Kunz MD  [facility] - [address] [address], [address], [place] [address] [phone]    Dr. Lucas Elias DO, Dr. Gael Herrera DO,  JOSEPH Miranda, and JOSEPH Gibson  [facility] - [address] [address], [address], [place] [address] [phone]     Dr. Baylee Thompson MD, and JOSEPH Monge  [facility] - [address], [place], [place] [address] [phone]    Dr. Ryder Fry MD and Dr. Devendra Garcia MD  [facility]  [address] [place] [address], [address]  [phone]    Dr. Maximo Cisse MD  [facility]  [address] [place] [address], [facility], [place], [place], [address] [phone]    Dr. Zack Ruiz MD  [facility] - [place]  [address], [place], [address] [phone]

## 2022-03-12 NOTE — ED PROVIDER NOTES
Subjective   Patient is a 36-year-old white female presents emergency department with abdominal pain and diarrhea for the past 4 days.  She states she has been having multiple episodes of diarrhea since then.  She denies any nausea or vomiting.  She states that she is having back pain as well.  She denies any fever or chills.  She states she is feeling very weak at this time.  She denies any blood in the stool.  She states that her pain is much worse today.  She states she feels like she is burning on the inside of her stomach.  She states she took Imodium yesterday and that controlled the diarrhea for about 6 hours and then she started again.  She denies any new medications.  She states her symptoms started after eating fish from a local restaurant 4 days ago.      History provided by:  Patient   used: No        Review of Systems   Constitutional: Negative.    HENT: Negative.    Eyes: Negative.    Respiratory: Negative.    Cardiovascular: Negative.    Gastrointestinal:        Patient is a 36-year-old white female presents emergency department with abdominal pain and diarrhea for the past 4 days.  She states she has been having multiple episodes of diarrhea since then.  She denies any nausea or vomiting.  She states that she is having back pain as well.  She denies any fever or chills.  She states she is feeling very weak at this time.  She denies any blood in the stool.  She states that her pain is much worse today.  She states she feels like she is burning on the inside of her stomach.  She states she took Imodium yesterday and that controlled the diarrhea for about 6 hours and then she started again.  She denies any new medications.  She states her symptoms started after eating fish from a local restaurant 4 days ago.     Endocrine: Negative.    Genitourinary: Negative.    Musculoskeletal: Negative.    Skin: Negative.    Allergic/Immunologic: Negative.    Neurological: Negative.     Hematological: Negative.    Psychiatric/Behavioral: Negative.    All other systems reviewed and are negative.      Past Medical History:   Diagnosis Date   • Migraine    • Recurrent pregnancy loss, antepartum condition or complication        No Known Allergies    Past Surgical History:   Procedure Laterality Date   •  SECTION     •  SECTION N/A 2021    Procedure: REPEAT  SECTION WITHOUT TUBAL LIGATION;  Surgeon: Priya Moreno MD;  Location: [facility] LABOR DELIVERY;  Service: Obstetrics/Gynecology;  Laterality: N/A;   • D & C WITH SUCTION     • EXPLORATORY LAPAROTOMY         History reviewed. No pertinent family history.    Social History     Socioeconomic History   • Marital status:    Tobacco Use   • Smoking status: Never Smoker   • Smokeless tobacco: Never Used   Vaping Use   • Vaping Use: Never used   Substance and Sexual Activity   • Alcohol use: No   • Drug use: No   • Sexual activity: Defer       Prior to Admission medications    Medication Sig Start Date End Date Taking? Authorizing Provider   ibuprofen (ADVIL,MOTRIN) 800 MG tablet Take 1 tablet by mouth Every 8 (Eight) Hours As Needed for Mild Pain  or Moderate Pain  (DO NOT START UNTIL COMPLETION OF TORADOL). 21   Priya Moreno MD   ondansetron ODT (ZOFRAN-ODT) 8 MG disintegrating tablet DISSOLVE 1 TABLET IN MOUTH ONCE DAILY FOR 30 DAYS 20   Emergency, Nurse Epic, RN   oxyCODONE-acetaminophen (PERCOCET) 5-325 MG per tablet Take 1 tablet by mouth Every 6 (Six) Hours As Needed for Moderate Pain  or Severe Pain . 21   Priya Moreno MD   Prenatal Vit-Fe Fumarate-FA (PrePLUS) 27-1 MG tablet Take 1 tablet by mouth Daily. 20   Emergency, Nurse Rick RN   promethazine (PHENERGAN) 25 MG tablet TAKE 1 TABLET BY MOUTH ONCE DAILY AS NEEDED FOR NAUSEA AND VOMITING FOR 30 DAYS 20   Emergency, Nurse Merline RN   Theanine 50 MG tablet dispersible Take 1 tablet by mouth Daily.     "Emergency, Nurse Merline, RN       /82   Pulse 76   Temp 98.2 °F (36.8 °C)   Resp 16   Ht 154.9 cm (61\")   Wt 67.1 kg (148 lb)   LMP 09/16/2020 (Approximate) Comment: Pt breastfeeding  SpO2 100%   Breastfeeding Yes   BMI 27.96 kg/m²     Objective   Physical Exam  Vitals and nursing note reviewed.   Constitutional:       Appearance: She is well-developed.      Comments: Nontoxic-appearing   HENT:      Head: Normocephalic and atraumatic.   Eyes:      Conjunctiva/sclera: Conjunctivae normal.      Pupils: Pupils are equal, round, and reactive to light.   Neck:      Thyroid: No thyromegaly.      Trachea: No tracheal deviation.   Cardiovascular:      Rate and Rhythm: Normal rate and regular rhythm.      Heart sounds: Normal heart sounds.   Pulmonary:      Effort: Pulmonary effort is normal. No respiratory distress.      Breath sounds: Normal breath sounds. No wheezing or rales.   Chest:      Chest wall: No tenderness.   Abdominal:      General: Bowel sounds are normal.      Palpations: Abdomen is soft.      Comments: Abdomen is soft, nondistended.  She has tenderness in the bilateral lower quadrants.  There is no guarding or rebound.   Musculoskeletal:         General: Normal range of motion.      Cervical back: Normal range of motion and neck supple.   Skin:     General: Skin is warm and dry.      Comments: Mild pallor noted   Neurological:      Mental Status: She is alert and oriented to person, place, and time.      Cranial Nerves: No cranial nerve deficit.      Deep Tendon Reflexes: Reflexes are normal and symmetric.   Psychiatric:         Behavior: Behavior normal.         Thought Content: Thought content normal.         Judgment: Judgment normal.         Procedures         Lab Results (last 24 hours)     Procedure Component Value Units Date/Time    CBC & Differential [[id number]]  (Abnormal) Collected: 03/12/22 0822    Specimen: Blood Updated: 03/12/22 0829    Narrative:      The following orders were " created for panel order CBC & Differential.  Procedure                               Abnormality         Status                     ---------                               -----------         ------                     CBC Auto Differential[[id number]]        Abnormal            Final result                 Please view results for these tests on the individual orders.    Comprehensive Metabolic Panel [[id number]]  (Abnormal) Collected: 03/12/22 0822    Specimen: Blood Updated: 03/12/22 0846     Glucose 98 mg/dL      BUN 9 mg/dL      Creatinine 0.51 mg/dL      Sodium 139 mmol/L      Potassium 3.9 mmol/L      Chloride 104 mmol/L      CO2 25.0 mmol/L      Calcium 9.4 mg/dL      Total Protein 7.3 g/dL      Albumin 4.40 g/dL      ALT (SGPT) 20 U/L      AST (SGOT) 23 U/L      Alkaline Phosphatase 109 U/L      Total Bilirubin 0.2 mg/dL      Globulin 2.9 gm/dL      A/G Ratio 1.5 g/dL      BUN/Creatinine Ratio 17.6     Anion Gap 10.0 mmol/L      eGFR 125.8 mL/min/1.73      Comment: National Kidney Foundation and American Society of Nephrology (ASN) Task Force recommended calculation based on the Chronic Kidney Disease Epidemiology Collaboration (CKD-EPI) equation refit without adjustment for race.       Narrative:      GFR Normal >60  Chronic Kidney Disease <60  Kidney Failure <15      Lactic Acid, Plasma [[id number]]  (Normal) Collected: 03/12/22 0822    Specimen: Blood Updated: 03/12/22 0844     Lactate 0.5 mmol/L     hCG, Serum, Qualitative [[id number]]  (Normal) Collected: 03/12/22 0822    Specimen: Blood Updated: 03/12/22 0844     HCG Qualitative Negative    CBC Auto Differential [[id number]]  (Abnormal) Collected: 03/12/22 0822    Specimen: Blood Updated: 03/12/22 0829     WBC 4.41 10*3/mm3      RBC 4.55 10*6/mm3      Hemoglobin 14.1 g/dL      Hematocrit 42.6 %      MCV 93.6 fL      MCH 31.0 pg      MCHC 33.1 g/dL      RDW 12.6 %      RDW-SD 43.5 fl      MPV 9.3 fL      Platelets 218 10*3/mm3      Neutrophil % 55.1 %       Lymphocyte % 26.1 %      Monocyte % 16.1 %      Eosinophil % 2.3 %      Basophil % 0.2 %      Immature Grans % 0.2 %      Neutrophils, Absolute 2.43 10*3/mm3      Lymphocytes, Absolute 1.15 10*3/mm3      Monocytes, Absolute 0.71 10*3/mm3      Eosinophils, Absolute 0.10 10*3/mm3      Basophils, Absolute 0.01 10*3/mm3      Immature Grans, Absolute 0.01 10*3/mm3      nRBC 0.0 /100 WBC     Urinalysis With Culture If Indicated - Urine, Clean Catch [[id number]]  (Abnormal) Collected: 03/12/22 0823    Specimen: Urine, Clean Catch Updated: 03/12/22 0844     Color, UA Yellow     Appearance, UA Cloudy     pH, UA 5.5     Specific Gravity, UA 1.029     Glucose, UA Negative     Ketones, UA Negative     Bilirubin, UA Negative     Blood, UA Small (1+)     Protein, UA Negative     Leuk Esterase, UA Negative     Nitrite, UA Negative     Urobilinogen, UA 0.2 E.U./dL    Urinalysis, Microscopic Only - Urine, Clean Catch [[id number]]  (Abnormal) Collected: 03/12/22 0823    Specimen: Urine, Clean Catch Updated: 03/12/22 0847     RBC, UA 0-2 /HPF      WBC, UA 3-5 /HPF      Bacteria, UA 1+ /HPF      Squamous Epithelial Cells, UA 0-2 /HPF      Hyaline Casts, UA None Seen /LPF      Methodology Manual Light Microscopy    Gastrointestinal Panel, PCR - Stool, Per Rectum [[id number]]  (Abnormal) Collected: 03/12/22 0838    Specimen: Stool from Per Rectum Updated: 03/12/22 1012     Campylobacter Not Detected     Plesiomonas shigelloides Not Detected     Salmonella Not Detected     Vibrio Not Detected     Vibrio cholerae Not Detected     Yersinia enterocolitica Not Detected     Enteroaggregative E. coli (EAEC) Not Detected     Enteropathogenic E. coli (EPEC) Not Detected     Enterotoxigenic E. coli (ETEC) lt/st Not Detected     Shiga-like toxin-producing E. coli (STEC) stx1/stx2 Not Detected     Shigella/Enteroinvasive E. coli (EIEC) Not Detected     Cryptosporidium Not Detected     Cyclospora cayetanensis Not Detected     Entamoeba histolytica Not  Detected     Giardia lamblia Not Detected     Adenovirus F40/41 Not Detected     Astrovirus Not Detected     Norovirus GI/GII Not Detected     Rotavirus A Detected     Sapovirus (I, II, IV or V) Not Detected          CT Abdomen Pelvis With Contrast   Final Result   1. Suggestion of wall thickening of the transverse colon. Short section   of colitis is considered. There is underdistention of portions of the   colon. There is no small bowel distention. The appendix is normal.   Gastric wall thickening likely due to underdistention.   2. Tiny nonobstructive stone left kidney.   3. A 1.2 cm fat-containing umbilical hernia.       The full report of this exam was immediately signed and available to the   emergency room. The patient is currently in the emergency room.   This report was finalized on 03/12/2022 09:07 by Dr. Tre Mendoza MD.          ED Course  ED Course as of 03/12/22 1249   Sat Mar 12, 2022   1016 Reevaluated the patient after IV fluids.  She states she is feeling much better.  She states that she has only had one episode of diarrhea since being here.  CAT scan of the abdomen pelvis shows possible colitis but could be from underdistention.  Stool studies positive for rotavirus.  She also has urinary tract infection.  Advised the patient would treat antibiotic with antibiotics for her urinary tract infection.  To increase oral fluids and to drink clear liquids only for 24 hours then advance as tolerated.  Patient states she is feeling much better at this time and is ready to go home.  We will send the patient home with some Bentyl for abdominal cramping as well.  Patient be discharged shortly in stable condition. [CW]      ED Course User Index  [CW] Haley Zavala APRN          MDM  Number of Diagnoses or Management Options  Acute UTI: minor  Diarrhea, unspecified type: minor  Rotavirus enteritis: minor     Amount and/or Complexity of Data Reviewed  Clinical lab tests: ordered and reviewed  Tests  in the radiology section of CPT®: ordered and reviewed    Patient Progress  Patient progress: stable      Final diagnoses:   Rotavirus enteritis   Diarrhea, unspecified type   Acute UTI          Haley Zavala, APRN  03/12/22 1242

## 2022-03-31 ENCOUNTER — HOSPITAL ENCOUNTER (EMERGENCY)
Facility: HOSPITAL | Age: 37
Discharge: HOME | End: 2022-03-31
Admitting: FAMILY MEDICINE

## 2022-03-31 ENCOUNTER — APPOINTMENT (OUTPATIENT)
Dept: CT IMAGING | Facility: HOSPITAL | Age: 37
End: 2022-03-31

## 2022-03-31 VITALS
TEMPERATURE: 98.1 F | HEART RATE: 92 BPM | WEIGHT: 148 LBS | SYSTOLIC BLOOD PRESSURE: 99 MMHG | RESPIRATION RATE: 16 BRPM | OXYGEN SATURATION: 98 % | HEIGHT: 61 IN | DIASTOLIC BLOOD PRESSURE: 72 MMHG | BODY MASS INDEX: 27.94 KG/M2

## 2022-03-31 DIAGNOSIS — E86.9 VOLUME DEPLETION: Primary | ICD-10-CM

## 2022-03-31 DIAGNOSIS — R11.0 NAUSEA: ICD-10-CM

## 2022-03-31 LAB
ALBUMIN SERPL-MCNC: 4.4 G/DL (ref 3.5–5.2)
ALBUMIN/GLOB SERPL: 1.4 G/DL
ALP SERPL-CCNC: 113 U/L (ref 39–117)
ALT SERPL W P-5'-P-CCNC: 14 U/L (ref 1–33)
AMYLASE SERPL-CCNC: 54 U/L (ref 28–100)
ANION GAP SERPL CALCULATED.3IONS-SCNC: 12 MMOL/L (ref 5–15)
AST SERPL-CCNC: 22 U/L (ref 1–32)
B-HCG UR QL: NEGATIVE
BASOPHILS # BLD AUTO: 0.01 10*3/MM3 (ref 0–0.2)
BASOPHILS # BLD AUTO: 0.02 10*3/MM3 (ref 0–0.2)
BASOPHILS NFR BLD AUTO: 0.1 % (ref 0–1.5)
BASOPHILS NFR BLD AUTO: 0.2 % (ref 0–1.5)
BILIRUB SERPL-MCNC: 0.3 MG/DL (ref 0–1.2)
BILIRUB UR QL STRIP: NEGATIVE
BUN SERPL-MCNC: 16 MG/DL (ref 6–20)
BUN/CREAT SERPL: 34.8 (ref 7–25)
CALCIUM SPEC-SCNC: 9.3 MG/DL (ref 8.6–10.5)
CHLORIDE SERPL-SCNC: 105 MMOL/L (ref 98–107)
CLARITY UR: CLEAR
CO2 SERPL-SCNC: 24 MMOL/L (ref 22–29)
COLOR UR: YELLOW
CREAT SERPL-MCNC: 0.46 MG/DL (ref 0.57–1)
D-LACTATE SERPL-SCNC: 1.1 MMOL/L (ref 0.5–2)
DEPRECATED RDW RBC AUTO: 43.6 FL (ref 37–54)
DEPRECATED RDW RBC AUTO: 43.8 FL (ref 37–54)
EGFRCR SERPLBLD CKD-EPI 2021: 129 ML/MIN/1.73
EOSINOPHIL # BLD AUTO: 0 10*3/MM3 (ref 0–0.4)
EOSINOPHIL # BLD AUTO: 0.01 10*3/MM3 (ref 0–0.4)
EOSINOPHIL NFR BLD AUTO: 0 % (ref 0.3–6.2)
EOSINOPHIL NFR BLD AUTO: 0.1 % (ref 0.3–6.2)
ERYTHROCYTE [DISTWIDTH] IN BLOOD BY AUTOMATED COUNT: 12.6 % (ref 12.3–15.4)
ERYTHROCYTE [DISTWIDTH] IN BLOOD BY AUTOMATED COUNT: 12.7 % (ref 12.3–15.4)
EXPIRATION DATE: NORMAL
FLUAV RNA RESP QL NAA+PROBE: NOT DETECTED
FLUBV RNA RESP QL NAA+PROBE: NOT DETECTED
GLOBULIN UR ELPH-MCNC: 3.2 GM/DL
GLUCOSE SERPL-MCNC: 131 MG/DL (ref 65–99)
GLUCOSE UR STRIP-MCNC: NEGATIVE MG/DL
HCT VFR BLD AUTO: 41.6 % (ref 34–46.6)
HCT VFR BLD AUTO: 44.2 % (ref 34–46.6)
HGB BLD-MCNC: 13.5 G/DL (ref 12–15.9)
HGB BLD-MCNC: 14.5 G/DL (ref 12–15.9)
HGB UR QL STRIP.AUTO: NEGATIVE
HOLD SPECIMEN: NORMAL
IMM GRANULOCYTES # BLD AUTO: 0.02 10*3/MM3 (ref 0–0.05)
IMM GRANULOCYTES # BLD AUTO: 0.04 10*3/MM3 (ref 0–0.05)
IMM GRANULOCYTES NFR BLD AUTO: 0.2 % (ref 0–0.5)
IMM GRANULOCYTES NFR BLD AUTO: 0.3 % (ref 0–0.5)
INTERNAL NEGATIVE CONTROL: NORMAL
INTERNAL POSITIVE CONTROL: NORMAL
KETONES UR QL STRIP: NEGATIVE
LEUKOCYTE ESTERASE UR QL STRIP.AUTO: NEGATIVE
LIPASE SERPL-CCNC: 22 U/L (ref 13–60)
LYMPHOCYTES # BLD AUTO: 0.41 10*3/MM3 (ref 0.7–3.1)
LYMPHOCYTES # BLD AUTO: 0.65 10*3/MM3 (ref 0.7–3.1)
LYMPHOCYTES NFR BLD AUTO: 3.6 % (ref 19.6–45.3)
LYMPHOCYTES NFR BLD AUTO: 7.1 % (ref 19.6–45.3)
Lab: NORMAL
MCH RBC QN AUTO: 30.5 PG (ref 26.6–33)
MCH RBC QN AUTO: 30.7 PG (ref 26.6–33)
MCHC RBC AUTO-ENTMCNC: 32.5 G/DL (ref 31.5–35.7)
MCHC RBC AUTO-ENTMCNC: 32.8 G/DL (ref 31.5–35.7)
MCV RBC AUTO: 93.4 FL (ref 79–97)
MCV RBC AUTO: 94.1 FL (ref 79–97)
MONOCYTES # BLD AUTO: 0.33 10*3/MM3 (ref 0.1–0.9)
MONOCYTES # BLD AUTO: 0.41 10*3/MM3 (ref 0.1–0.9)
MONOCYTES NFR BLD AUTO: 2.9 % (ref 5–12)
MONOCYTES NFR BLD AUTO: 4.5 % (ref 5–12)
NEUTROPHILS NFR BLD AUTO: 10.64 10*3/MM3 (ref 1.7–7)
NEUTROPHILS NFR BLD AUTO: 8.04 10*3/MM3 (ref 1.7–7)
NEUTROPHILS NFR BLD AUTO: 88.1 % (ref 42.7–76)
NEUTROPHILS NFR BLD AUTO: 92.9 % (ref 42.7–76)
NITRITE UR QL STRIP: NEGATIVE
NRBC BLD AUTO-RTO: 0 /100 WBC (ref 0–0.2)
NRBC BLD AUTO-RTO: 0 /100 WBC (ref 0–0.2)
PH UR STRIP.AUTO: 6.5 [PH] (ref 5–8)
PLATELET # BLD AUTO: 232 10*3/MM3 (ref 140–450)
PLATELET # BLD AUTO: 256 10*3/MM3 (ref 140–450)
PMV BLD AUTO: 9.6 FL (ref 6–12)
PMV BLD AUTO: 9.7 FL (ref 6–12)
POTASSIUM SERPL-SCNC: 4 MMOL/L (ref 3.5–5.2)
PROT SERPL-MCNC: 7.6 G/DL (ref 6–8.5)
PROT UR QL STRIP: NEGATIVE
RBC # BLD AUTO: 4.42 10*6/MM3 (ref 3.77–5.28)
RBC # BLD AUTO: 4.73 10*6/MM3 (ref 3.77–5.28)
SARS-COV-2 RNA RESP QL NAA+PROBE: NOT DETECTED
SODIUM SERPL-SCNC: 141 MMOL/L (ref 136–145)
SP GR UR STRIP: >1.03 (ref 1–1.03)
UROBILINOGEN UR QL STRIP: ABNORMAL
WBC NRBC COR # BLD: 11.45 10*3/MM3 (ref 3.4–10.8)
WBC NRBC COR # BLD: 9.13 10*3/MM3 (ref 3.4–10.8)
WHOLE BLOOD HOLD SPECIMEN: NORMAL
WHOLE BLOOD HOLD SPECIMEN: NORMAL

## 2022-03-31 PROCEDURE — 85025 COMPLETE CBC W/AUTO DIFF WBC: CPT

## 2022-03-31 PROCEDURE — 85025 COMPLETE CBC W/AUTO DIFF WBC: CPT | Performed by: NURSE PRACTITIONER

## 2022-03-31 PROCEDURE — 81003 URINALYSIS AUTO W/O SCOPE: CPT

## 2022-03-31 PROCEDURE — 87086 URINE CULTURE/COLONY COUNT: CPT | Performed by: NURSE PRACTITIONER

## 2022-03-31 PROCEDURE — 83605 ASSAY OF LACTIC ACID: CPT

## 2022-03-31 PROCEDURE — 80053 COMPREHEN METABOLIC PANEL: CPT

## 2022-03-31 PROCEDURE — 99283 EMERGENCY DEPT VISIT LOW MDM: CPT

## 2022-03-31 PROCEDURE — 96374 THER/PROPH/DIAG INJ IV PUSH: CPT

## 2022-03-31 PROCEDURE — 83690 ASSAY OF LIPASE: CPT

## 2022-03-31 PROCEDURE — 74176 CT ABD & PELVIS W/O CONTRAST: CPT

## 2022-03-31 PROCEDURE — 25010000002 ONDANSETRON PER 1 MG: Performed by: NURSE PRACTITIONER

## 2022-03-31 PROCEDURE — 81025 URINE PREGNANCY TEST: CPT | Performed by: NURSE PRACTITIONER

## 2022-03-31 PROCEDURE — 82150 ASSAY OF AMYLASE: CPT | Performed by: NURSE PRACTITIONER

## 2022-03-31 PROCEDURE — 87636 SARSCOV2 & INF A&B AMP PRB: CPT | Performed by: NURSE PRACTITIONER

## 2022-03-31 RX ORDER — ONDANSETRON 2 MG/ML
4 INJECTION INTRAMUSCULAR; INTRAVENOUS ONCE
Status: COMPLETED | OUTPATIENT
Start: 2022-03-31 | End: 2022-03-31

## 2022-03-31 RX ORDER — SODIUM CHLORIDE 0.9 % (FLUSH) 0.9 %
10 SYRINGE (ML) INJECTION AS NEEDED
Status: DISCONTINUED | OUTPATIENT
Start: 2022-03-31 | End: 2022-04-01 | Stop reason: HOSPADM

## 2022-03-31 RX ORDER — ONDANSETRON 4 MG/1
4 TABLET, ORALLY DISINTEGRATING ORAL EVERY 6 HOURS PRN
Qty: 10 TABLET | Refills: 0 | Status: SHIPPED | OUTPATIENT
Start: 2022-03-31 | End: 2022-11-30

## 2022-03-31 RX ADMIN — SODIUM CHLORIDE, POTASSIUM CHLORIDE, SODIUM LACTATE AND CALCIUM CHLORIDE 1000 ML: 600; 310; 30; 20 INJECTION, SOLUTION INTRAVENOUS at 21:05

## 2022-03-31 RX ADMIN — ONDANSETRON HYDROCHLORIDE 4 MG: 2 SOLUTION INTRAMUSCULAR; INTRAVENOUS at 21:06

## 2022-04-01 LAB — BACTERIA SPEC AEROBE CULT: NORMAL

## 2022-04-01 NOTE — ED PROVIDER NOTES
Subjective   Pt Is a 36-year-old white female presents the emergency department with urinary frequency and dysuria that started last night.  She states she has been having some abdominal cramping today and generalized body aches as well.  She has had nausea without vomiting.  She states she has not eaten or drank much today's secondary to the nausea.  She denies any diarrhea.  She states that she has felt feverish.  Patient was seen here March 12 with abdominal pain and diarrhea and had a CAT scan which showed colitis.  Her stool study was positive for rotavirus and she also was treated for urinary tract infection.  She states she got feeling better until yesterday when she started having generalized body aches and urinary frequency again.  She states that she had a leftover Omnicef and she took it today to see if it would help her.  She states she continued to have nausea and felt that she needed to be treated emergency department for further evaluation and treatment.      History provided by:  Patient   used: No        Review of Systems   Constitutional: Negative.    HENT: Negative.    Eyes: Negative.    Respiratory: Negative.    Cardiovascular: Negative.    Gastrointestinal: Negative.    Endocrine: Negative.    Genitourinary:        Pt Is a 36-year-old white female presents the emergency department with urinary frequency and dysuria that started last night.  She states she has been having some abdominal cramping today and generalized body aches as well.  She has had nausea without vomiting.  She states she has not eaten or drank much today's secondary to the nausea.  She denies any diarrhea.  She states that she has felt feverish.  Patient was seen here March 12 with abdominal pain and diarrhea and had a CAT scan which showed colitis.  Her stool study was positive for rotavirus and she also was treated for urinary tract infection.  She states she got feeling better until yesterday when she  started having generalized body aches and urinary frequency again.  She states that she had a leftover Omnicef and she took it today to see if it would help her.  She states she continued to have nausea and felt that she needed to be treated emergency department for further evaluation and treatment.     Musculoskeletal: Negative.    Skin: Negative.    Allergic/Immunologic: Negative.    Neurological: Negative.    Hematological: Negative.    Psychiatric/Behavioral: Negative.    All other systems reviewed and are negative.      Past Medical History:   Diagnosis Date   • Migraine    • Recurrent pregnancy loss, antepartum condition or complication        No Known Allergies    Past Surgical History:   Procedure Laterality Date   •  SECTION     •  SECTION N/A 2021    Procedure: REPEAT  SECTION WITHOUT TUBAL LIGATION;  Surgeon: Priya Moreno MD;  Location: [facility] LABOR DELIVERY;  Service: Obstetrics/Gynecology;  Laterality: N/A;   • D & C WITH SUCTION     • EXPLORATORY LAPAROTOMY         History reviewed. No pertinent family history.    Social History     Socioeconomic History   • Marital status:    Tobacco Use   • Smoking status: Never Smoker   • Smokeless tobacco: Never Used   Vaping Use   • Vaping Use: Never used   Substance and Sexual Activity   • Alcohol use: No   • Drug use: No   • Sexual activity: Defer       Prior to Admission medications    Medication Sig Start Date End Date Taking? Authorizing Provider   dicyclomine (BENTYL) 20 MG tablet Take 1 tablet by mouth Every 8 (Eight) Hours As Needed (abd cramping). 3/12/22   Haley Zavala APRN   ibuprofen (ADVIL,MOTRIN) 800 MG tablet Take 1 tablet by mouth Every 8 (Eight) Hours As Needed for Mild Pain  or Moderate Pain  (DO NOT START UNTIL COMPLETION OF TORADOL). 21   Priya Moreno MD   ondansetron ODT (ZOFRAN-ODT) 8 MG disintegrating tablet DISSOLVE 1 TABLET IN MOUTH ONCE DAILY FOR 30 DAYS 20    "Emergency, Nurse Epic, RN   oxyCODONE-acetaminophen (PERCOCET) 5-325 MG per tablet Take 1 tablet by mouth Every 6 (Six) Hours As Needed for Moderate Pain  or Severe Pain . 6/19/21   Priya Moreno MD   Prenatal Vit-Fe Fumarate-FA (PrePLUS) 27-1 MG tablet Take 1 tablet by mouth Daily. 11/6/20   Emergency, Nurse Merline RN   promethazine (PHENERGAN) 25 MG tablet TAKE 1 TABLET BY MOUTH ONCE DAILY AS NEEDED FOR NAUSEA AND VOMITING FOR 30 DAYS 11/5/20   Emergency, Nurse Epic, RN   Theanine 50 MG tablet dispersible Take 1 tablet by mouth Daily.    Emergency, Nurse Merline RN       BP 99/72 (BP Location: Left arm, Patient Position: Sitting)   Pulse 92   Temp 98.1 °F (36.7 °C) (Oral)   Resp 16   Ht 154.9 cm (61\")   Wt 67.1 kg (148 lb)   SpO2 98%   Breastfeeding Yes   BMI 27.96 kg/m²     Objective   Physical Exam  Vitals and nursing note reviewed.   Constitutional:       Appearance: She is well-developed.      Comments: Nontoxic-appearing.  No acute distress.   HENT:      Head: Normocephalic and atraumatic.   Eyes:      Conjunctiva/sclera: Conjunctivae normal.      Pupils: Pupils are equal, round, and reactive to light.   Neck:      Thyroid: No thyromegaly.      Trachea: No tracheal deviation.   Cardiovascular:      Rate and Rhythm: Normal rate and regular rhythm.      Heart sounds: Normal heart sounds.   Pulmonary:      Effort: Pulmonary effort is normal. No respiratory distress.      Breath sounds: Normal breath sounds. No wheezing or rales.   Chest:      Chest wall: No tenderness.   Abdominal:      General: Bowel sounds are normal.      Palpations: Abdomen is soft.      Comments: Abdomen soft, nondistended.  Mild generalized abd tenderness noted on palpation. No guarding or rebound noted.    Musculoskeletal:         General: Normal range of motion.      Cervical back: Normal range of motion and neck supple.   Skin:     General: Skin is warm and dry.      Comments: Mild pallor noted   Neurological:      " Mental Status: She is alert and oriented to person, place, and time.      Cranial Nerves: No cranial nerve deficit.      Deep Tendon Reflexes: Reflexes are normal and symmetric.   Psychiatric:         Behavior: Behavior normal.         Thought Content: Thought content normal.         Judgment: Judgment normal.         Procedures         Lab Results (last 24 hours)     Procedure Component Value Units Date/Time    Urinalysis With Microscopic If Indicated (No Culture) - Urine, Clean Catch [[id number]]  (Abnormal) Collected: 03/31/22 1754    Specimen: Urine, Clean Catch Updated: 03/31/22 1804     Color, UA Yellow     Appearance, UA Clear     pH, UA 6.5     Specific Gravity, UA >1.030     Glucose, UA Negative     Ketones, UA Negative     Bilirubin, UA Negative     Blood, UA Negative     Protein, UA Negative     Leuk Esterase, UA Negative     Nitrite, UA Negative     Urobilinogen, UA 1.0 E.U./dL    Narrative:      Urine microscopic not indicated.    Urine Culture - Urine, Urine, Clean Catch [[id number]] Collected: 03/31/22 1754    Specimen: Urine, Clean Catch Updated: 03/31/22 1956    CBC & Differential [[id number]]  (Abnormal) Collected: 03/31/22 1838    Specimen: Blood Updated: 03/31/22 1844    Narrative:      The following orders were created for panel order CBC & Differential.  Procedure                               Abnormality         Status                     ---------                               -----------         ------                     CBC Auto Differential[[id number]]        Abnormal            Final result                 Please view results for these tests on the individual orders.    Comprehensive Metabolic Panel [[id number]]  (Abnormal) Collected: 03/31/22 1838    Specimen: Blood Updated: 03/31/22 1901     Glucose 131 mg/dL      BUN 16 mg/dL      Creatinine 0.46 mg/dL      Sodium 141 mmol/L      Potassium 4.0 mmol/L      Comment: Slight hemolysis detected by analyzer. Results may be affected.         Chloride 105 mmol/L      CO2 24.0 mmol/L      Calcium 9.3 mg/dL      Total Protein 7.6 g/dL      Albumin 4.40 g/dL      ALT (SGPT) 14 U/L      AST (SGOT) 22 U/L      Alkaline Phosphatase 113 U/L      Total Bilirubin 0.3 mg/dL      Globulin 3.2 gm/dL      A/G Ratio 1.4 g/dL      BUN/Creatinine Ratio 34.8     Anion Gap 12.0 mmol/L      eGFR 129.0 mL/min/1.73      Comment: National Kidney Foundation and American Society of Nephrology (ASN) Task Force recommended calculation based on the Chronic Kidney Disease Epidemiology Collaboration (CKD-EPI) equation refit without adjustment for race.       Narrative:      GFR Normal >60  Chronic Kidney Disease <60  Kidney Failure <15      Lipase [[id number]]  (Normal) Collected: 03/31/22 1838    Specimen: Blood Updated: 03/31/22 1856     Lipase 22 U/L     Lactic Acid, Plasma [[id number]]  (Normal) Collected: 03/31/22 1838    Specimen: Blood Updated: 03/31/22 1859     Lactate 1.1 mmol/L     CBC Auto Differential [[id number]]  (Abnormal) Collected: 03/31/22 1838    Specimen: Blood Updated: 03/31/22 1844     WBC 11.45 10*3/mm3      RBC 4.73 10*6/mm3      Hemoglobin 14.5 g/dL      Hematocrit 44.2 %      MCV 93.4 fL      MCH 30.7 pg      MCHC 32.8 g/dL      RDW 12.6 %      RDW-SD 43.8 fl      MPV 9.6 fL      Platelets 256 10*3/mm3      Neutrophil % 92.9 %      Lymphocyte % 3.6 %      Monocyte % 2.9 %      Eosinophil % 0.1 %      Basophil % 0.2 %      Immature Grans % 0.3 %      Neutrophils, Absolute 10.64 10*3/mm3      Lymphocytes, Absolute 0.41 10*3/mm3      Monocytes, Absolute 0.33 10*3/mm3      Eosinophils, Absolute 0.01 10*3/mm3      Basophils, Absolute 0.02 10*3/mm3      Immature Grans, Absolute 0.04 10*3/mm3      nRBC 0.0 /100 WBC     Amylase [[id number]]  (Normal) Collected: 03/31/22 1838    Specimen: Blood Updated: 03/31/22 2006     Amylase 54 U/L     POC Pregnancy, Urine [[id number]] Collected: 03/31/22 2108    Specimen: Urine Updated: 03/31/22 2108     HCG, Urine, QL Negative      Lot Number [id number]     Internal Positive Control Passed     Internal Negative Control Passed     Expiration Date 07/31/2023    CBC & Differential [[id number]]  (Abnormal) Collected: 03/31/22 2110    Specimen: Blood Updated: 03/31/22 2122    Narrative:      The following orders were created for panel order CBC & Differential.  Procedure                               Abnormality         Status                     ---------                               -----------         ------                     CBC Auto Differential[[id number]]        Abnormal            Final result                 Please view results for these tests on the individual orders.    CBC Auto Differential [[id number]]  (Abnormal) Collected: 03/31/22 2110    Specimen: Blood Updated: 03/31/22 2122     WBC 9.13 10*3/mm3      RBC 4.42 10*6/mm3      Hemoglobin 13.5 g/dL      Hematocrit 41.6 %      MCV 94.1 fL      MCH 30.5 pg      MCHC 32.5 g/dL      RDW 12.7 %      RDW-SD 43.6 fl      MPV 9.7 fL      Platelets 232 10*3/mm3      Neutrophil % 88.1 %      Lymphocyte % 7.1 %      Monocyte % 4.5 %      Eosinophil % 0.0 %      Basophil % 0.1 %      Immature Grans % 0.2 %      Neutrophils, Absolute 8.04 10*3/mm3      Lymphocytes, Absolute 0.65 10*3/mm3      Monocytes, Absolute 0.41 10*3/mm3      Eosinophils, Absolute 0.00 10*3/mm3      Basophils, Absolute 0.01 10*3/mm3      Immature Grans, Absolute 0.02 10*3/mm3      nRBC 0.0 /100 WBC     COVID PRE-OP / PRE-PROCEDURE SCREENING ORDER (NO ISOLATION) - Swab, Nasopharynx [[id number]]  (Normal) Collected: 03/31/22 2115    Specimen: Swab from Nasopharynx Updated: 03/31/22 2143    Narrative:      The following orders were created for panel order COVID PRE-OP / PRE-PROCEDURE SCREENING ORDER (NO ISOLATION) - Swab, Nasopharynx.  Procedure                               Abnormality         Status                     ---------                               -----------         ------                     COVID-19 and FLU A/B  PCR...[[id number]]  Normal              Final result                 Please view results for these tests on the individual orders.    COVID-19 and FLU A/B PCR - Swab, Nasopharynx [[id number]]  (Normal) Collected: 03/31/22 2115    Specimen: Swab from Nasopharynx Updated: 03/31/22 2143     COVID19 Not Detected     Influenza A PCR Not Detected     Influenza B PCR Not Detected    Narrative:      Fact sheet for providers: https://www.fda.gov/media/479835/download    Fact sheet for patients: https://www.fda.gov/media/306696/download    Test performed by PCR.          CT Abdomen Pelvis Without Contrast   Final Result   1. Bilateral nonobstructing nephrolithiasis. No evidence of ureteral   stone or obstructive uropathy.   2. Normal bowel gas pattern with no obstruction or free air. The   appendix is normal in appearance.   3. Small fat-containing periumbilical hernia.   4. The uterus and adnexa are unremarkable..            This report was finalized on 03/31/2022 22:22 by Dr. Taqueria Lehman MD.          ED Course  ED Course as of 04/01/22 0039   u Mar 31, 2022   2123 Pending ct scan at this time  [CW]   2240 IMPRESSION:  1. Bilateral nonobstructing nephrolithiasis. No evidence of ureteral  stone or obstructive uropathy.  2. Normal bowel gas pattern with no obstruction or free air. The  appendix is normal in appearance.  3. Small fat-containing periumbilical hernia.  4. The uterus and adnexa are unremarkable..     Advised pt of findings. She states that she is feeling much better after ivf. She states that nausea has resolved since zofran. Advised of incidental finding of nephrolithiasis as well. Pt states influenza a and influenza b are negative as well as covid 19. Color much improved. Pt states that she is ready to go home at this time. Will be discharged shortly in stable condition  [CW]      ED Course User Index  [CW] Haley Zavala APRN          MDM  Number of Diagnoses or Management Options  Nausea:  minor  Volume depletion: minor     Amount and/or Complexity of Data Reviewed  Clinical lab tests: ordered and reviewed  Tests in the radiology section of CPT®: ordered and reviewed  Decide to obtain previous medical records or to obtain history from someone other than the patient: yes    Patient Progress  Patient progress: stable      Final diagnoses:   Volume depletion   Nausea          Haley Zavala, APRN  04/01/22 0039

## 2022-04-01 NOTE — DISCHARGE INSTRUCTIONS
Return to ER if symptoms worsen   Clear liquids only for 24 hours, then advance as tolerated  Increase oral fluids

## 2022-04-05 ENCOUNTER — PATIENT OUTREACH (OUTPATIENT)
Dept: CASE MANAGEMENT | Facility: OTHER | Age: 37
End: 2022-04-05

## 2022-04-05 NOTE — OUTREACH NOTE
AMBULATORY CASE MANAGEMENT NOTE    Name and Relationship of Patient/Support Person: Andria Ureña - Self    Patient Outreach     Cary GARRETT ACO patient seen at [facility] ED 3.31.22 for volume depletion and nausea. Patient currently at [facility]. Discussed assistance with establishing PCP and she will contact [facility] if needing assistance.     Adult Patient Profile  Questions/Answers    Flowsheet Row Most Recent Value   Symptoms/Conditions Managed at Home gastrointestinal   Barriers to Managing Health none   Gastrointestinal Symptoms/Conditions nausea   Gastrointestinal Management Strategies adequate rest, other (see comments)  [hydration]   Gastrointestinal Comment Has been adding liquid IV to her drink        Send Education  Questions/Answers    Flowsheet Row Most Recent Value   Other Patient Education/Resources  --  [provided with [facility] contact information]              TOM PINZON  Ambulatory Case Management    4/5/2022, 11:01 CDT

## 2022-09-09 ENCOUNTER — HOSPITAL ENCOUNTER (EMERGENCY)
Facility: HOSPITAL | Age: 37
Discharge: HOME | End: 2022-09-09
Admitting: EMERGENCY MEDICINE

## 2022-09-09 ENCOUNTER — APPOINTMENT (OUTPATIENT)
Dept: GENERAL RADIOLOGY | Facility: HOSPITAL | Age: 37
End: 2022-09-09

## 2022-09-09 VITALS
HEIGHT: 61 IN | WEIGHT: 146 LBS | RESPIRATION RATE: 16 BRPM | TEMPERATURE: 98 F | SYSTOLIC BLOOD PRESSURE: 126 MMHG | OXYGEN SATURATION: 99 % | BODY MASS INDEX: 27.56 KG/M2 | DIASTOLIC BLOOD PRESSURE: 91 MMHG | HEART RATE: 78 BPM

## 2022-09-09 DIAGNOSIS — S93.401A SPRAIN OF RIGHT ANKLE, UNSPECIFIED LIGAMENT, INITIAL ENCOUNTER: ICD-10-CM

## 2022-09-09 DIAGNOSIS — S93.601A SPRAIN OF RIGHT FOOT, INITIAL ENCOUNTER: Primary | ICD-10-CM

## 2022-09-09 PROCEDURE — 99283 EMERGENCY DEPT VISIT LOW MDM: CPT

## 2022-09-09 PROCEDURE — 73630 X-RAY EXAM OF FOOT: CPT

## 2022-09-09 PROCEDURE — 73610 X-RAY EXAM OF ANKLE: CPT

## 2022-09-09 RX ORDER — KETOROLAC TROMETHAMINE 10 MG/1
10 TABLET, FILM COATED ORAL EVERY 6 HOURS PRN
Qty: 20 TABLET | Refills: 0 | Status: SHIPPED | OUTPATIENT
Start: 2022-09-09 | End: 2022-11-30

## 2022-09-09 NOTE — DISCHARGE INSTRUCTIONS
Elevate extremity at rest; f/u with orthopedics with no improvements- Dr. Hernandez on call  Ice 20 minutes on and then 20 minutes off for the first 24-48 hours and then warm moist heat prn

## 2022-09-09 NOTE — ED PROVIDER NOTES
Subjective   PIT    Patient is a 36-year-old female who presents to the ER with complaints of right foot and right ankle pain.  She states that she was stepping down off her back porch last night in the dark.  She rolled her right foot and right ankle.  She has had pain with swelling in particular to the right foot with difficulty bearing weight.  Due to symptoms described she came the ER for evaluation and treatment.          Review of Systems   Constitutional: Negative.  Negative for fever.   HENT: Negative.  Negative for congestion.    Eyes: Negative.    Respiratory: Negative.  Negative for cough and shortness of breath.    Cardiovascular: Negative.  Negative for chest pain.   Gastrointestinal: Negative.  Negative for abdominal pain, diarrhea, nausea and vomiting.   Genitourinary: Negative.    Musculoskeletal:        Positive right foot and right ankle pain   Skin: Negative.  Negative for wound.   All other systems reviewed and are negative.      Past Medical History:   Diagnosis Date   • Migraine    • Recurrent pregnancy loss, antepartum condition or complication        No Known Allergies    Past Surgical History:   Procedure Laterality Date   •  SECTION     •  SECTION N/A 2021    Procedure: REPEAT  SECTION WITHOUT TUBAL LIGATION;  Surgeon: Priya Moreno MD;  Location: [facility] LABOR DELIVERY;  Service: Obstetrics/Gynecology;  Laterality: N/A;   • D & C WITH SUCTION     • EXPLORATORY LAPAROTOMY         History reviewed. No pertinent family history.    Social History     Socioeconomic History   • Marital status:    Tobacco Use   • Smoking status: Never Smoker   • Smokeless tobacco: Never Used   Vaping Use   • Vaping Use: Never used   Substance and Sexual Activity   • Alcohol use: No   • Drug use: No   • Sexual activity: Defer           Objective   Physical Exam  Vitals and nursing note reviewed.   Constitutional:       Appearance: Normal appearance. She is  well-developed.   HENT:      Head: Normocephalic and atraumatic.      Right Ear: External ear normal.      Left Ear: External ear normal.      Nose: Nose normal.      Mouth/Throat:      Pharynx: Oropharynx is clear.   Eyes:      Extraocular Movements: Extraocular movements intact.      Conjunctiva/sclera: Conjunctivae normal.   Cardiovascular:      Rate and Rhythm: Normal rate and regular rhythm.      Heart sounds: Normal heart sounds.   Pulmonary:      Effort: Pulmonary effort is normal.      Breath sounds: Normal breath sounds.   Abdominal:      General: Bowel sounds are normal.      Palpations: Abdomen is soft.   Musculoskeletal:         General: Swelling, tenderness and signs of injury present.      Cervical back: Normal range of motion and neck supple.      Comments: Pain to palpation to the dorsal aspect of the right foot with bruising and mild to moderate swelling noted, pain to palpation to the medial aspect of the right ankle with range of motion intact, neurovascular intact, sensory intact   Skin:     General: Skin is warm and dry.      Capillary Refill: Capillary refill takes less than 2 seconds.   Neurological:      General: No focal deficit present.      Mental Status: She is alert and oriented to person, place, and time.   Psychiatric:         Mood and Affect: Mood normal.         Behavior: Behavior normal.         Thought Content: Thought content normal.         Judgment: Judgment normal.         Procedures           ED Course  ED Course as of 09/09/22 0942   Fri Sep 09, 2022   0914 X-rays are both negative to the foot and ankle.  Patient will be given Aircast, Ace wrap, and crutches.  She will be discharged home shortly in stable condition. [TW]      ED Course User Index  [TW] Barbra Coughlin APRN                                           MDM  Number of Diagnoses or Management Options  Sprain of right ankle, unspecified ligament, initial encounter: new and requires workup  Sprain of right foot,  initial encounter: new and requires workup     Amount and/or Complexity of Data Reviewed  Tests in the radiology section of CPT®: ordered and reviewed  Discuss the patient with other providers: yes    Risk of Complications, Morbidity, and/or Mortality  Presenting problems: low  Diagnostic procedures: low  Management options: low    Patient Progress  Patient progress: improved      Final diagnoses:   Sprain of right foot, initial encounter   Sprain of right ankle, unspecified ligament, initial encounter       ED Disposition  ED Disposition     ED Disposition   Discharge    Condition   Good    Comment   --             No follow-up provider specified.       Medication List      New Prescriptions    ketorolac 10 MG tablet  Commonly known as: TORADOL  Take 1 tablet by mouth Every 6 (Six) Hours As Needed for Moderate Pain.        Stop    ibuprofen 800 MG tablet  Commonly known as: ADVIL,MOTRIN           Where to Get Your Medications      These medications were sent to [facility] [address] [place] [address] [facility] - [phone]  - [phone] [address] [address]    Phone: [phone]   · ketorolac 10 MG tablet          Barbra Coughlin, APRN  09/09/22 0942

## 2022-11-30 ENCOUNTER — HOSPITAL ENCOUNTER (OUTPATIENT)
Dept: GENERAL RADIOLOGY | Facility: HOSPITAL | Age: 37
Discharge: HOME | End: 2022-11-30

## 2022-11-30 PROCEDURE — 74018 RADEX ABDOMEN 1 VIEW: CPT

## 2023-01-09 ENCOUNTER — OFFICE VISIT (OUTPATIENT)
Dept: FAMILY MEDICINE CLINIC | Facility: CLINIC | Age: 38
End: 2023-01-09
Payer: MEDICAID

## 2023-01-09 ENCOUNTER — LAB (OUTPATIENT)
Dept: LAB | Facility: HOSPITAL | Age: 38
End: 2023-01-09
Payer: MEDICAID

## 2023-01-09 VITALS
TEMPERATURE: 98.1 F | BODY MASS INDEX: 29.45 KG/M2 | RESPIRATION RATE: 20 BRPM | WEIGHT: 156 LBS | HEART RATE: 82 BPM | SYSTOLIC BLOOD PRESSURE: 124 MMHG | OXYGEN SATURATION: 98 % | DIASTOLIC BLOOD PRESSURE: 85 MMHG | HEIGHT: 61 IN

## 2023-01-09 DIAGNOSIS — R53.83 OTHER FATIGUE: ICD-10-CM

## 2023-01-09 DIAGNOSIS — F41.8 MIXED ANXIETY AND DEPRESSIVE DISORDER: ICD-10-CM

## 2023-01-09 DIAGNOSIS — R41.840 POOR CONCENTRATION: Primary | ICD-10-CM

## 2023-01-09 LAB
ALBUMIN SERPL-MCNC: 4.7 G/DL (ref 3.5–5)
ALBUMIN/GLOB SERPL: 1.4 G/DL (ref 1.1–2.5)
ALP SERPL-CCNC: 98 U/L (ref 24–120)
ALT SERPL W P-5'-P-CCNC: 20 U/L (ref 0–35)
ANION GAP SERPL CALCULATED.3IONS-SCNC: 7 MMOL/L (ref 4–13)
AST SERPL-CCNC: 26 U/L (ref 7–45)
AUTO MIXED CELLS #: 0.6 10*3/MM3 (ref 0.1–2.6)
AUTO MIXED CELLS %: 7.4 % (ref 0.1–24)
BILIRUB SERPL-MCNC: 0.2 MG/DL (ref 0.1–1)
BUN SERPL-MCNC: 19 MG/DL (ref 5–21)
BUN/CREAT SERPL: 31.1
CALCIUM SPEC-SCNC: 9.8 MG/DL (ref 8.4–10.4)
CHLORIDE SERPL-SCNC: 105 MMOL/L (ref 98–110)
CO2 SERPL-SCNC: 31 MMOL/L (ref 24–31)
CREAT SERPL-MCNC: 0.61 MG/DL (ref 0.5–1.4)
EGFRCR SERPLBLD CKD-EPI 2021: 119.7 ML/MIN/1.73
ERYTHROCYTE [DISTWIDTH] IN BLOOD BY AUTOMATED COUNT: 12 % (ref 12.3–15.4)
GLOBULIN UR ELPH-MCNC: 3.4 GM/DL
GLUCOSE SERPL-MCNC: 106 MG/DL (ref 70–100)
HBA1C MFR BLD: 5.3 % (ref 4.8–5.9)
HCT VFR BLD AUTO: 39.6 % (ref 34–46.6)
HGB BLD-MCNC: 13.2 G/DL (ref 12–15.9)
LYMPHOCYTES # BLD AUTO: 2.3 10*3/MM3 (ref 0.7–3.1)
LYMPHOCYTES NFR BLD AUTO: 30.1 % (ref 19.6–45.3)
MCH RBC QN AUTO: 31.1 PG (ref 26.6–33)
MCHC RBC AUTO-ENTMCNC: 33.3 G/DL (ref 31.5–35.7)
MCV RBC AUTO: 93.2 FL (ref 79–97)
NEUTROPHILS NFR BLD AUTO: 4.6 10*3/MM3 (ref 1.7–7)
NEUTROPHILS NFR BLD AUTO: 62.5 % (ref 42.7–76)
PLATELET # BLD AUTO: 259 10*3/MM3 (ref 140–450)
PMV BLD AUTO: 8.8 FL (ref 6–12)
POTASSIUM SERPL-SCNC: 4.6 MMOL/L (ref 3.5–5.3)
PROT SERPL-MCNC: 8.1 G/DL (ref 6.3–8.7)
RBC # BLD AUTO: 4.25 10*6/MM3 (ref 3.77–5.28)
SODIUM SERPL-SCNC: 143 MMOL/L (ref 135–145)
WBC NRBC COR # BLD: 7.5 10*3/MM3 (ref 3.4–10.8)

## 2023-01-09 PROCEDURE — 99214 OFFICE O/P EST MOD 30 MIN: CPT | Performed by: NURSE PRACTITIONER

## 2023-01-09 PROCEDURE — 82607 VITAMIN B-12: CPT

## 2023-01-09 PROCEDURE — 85025 COMPLETE CBC W/AUTO DIFF WBC: CPT

## 2023-01-09 PROCEDURE — 36415 COLL VENOUS BLD VENIPUNCTURE: CPT

## 2023-01-09 PROCEDURE — 80050 GENERAL HEALTH PANEL: CPT

## 2023-01-09 PROCEDURE — 83036 HEMOGLOBIN GLYCOSYLATED A1C: CPT

## 2023-01-09 PROCEDURE — 82306 VITAMIN D 25 HYDROXY: CPT

## 2023-01-09 PROCEDURE — 80053 COMPREHEN METABOLIC PANEL: CPT

## 2023-01-09 NOTE — PROGRESS NOTES
Chief Complaint  Fatigue and Anxiety    Subjective    History of Present Illness      Patient presents to [facility] for   History of Present Illness  Pt is here today as a new pt.  Pt has a previous dx of depression. She is not sure what she is feeling lately If it is more anxiety/depression or adhd in nature.         Review of Systems    I have reviewed and agree with the HPI and ROS information as above.  Lola Moreno Akhil, APRN     Objective   Vital Signs:   /85   Pulse 82   Temp 98.1 °F (36.7 °C)   Resp 20   Ht 154.9 cm (61\")   Wt 70.8 kg (156 lb)   SpO2 98%   BMI 29.48 kg/m²       Physical Exam  Constitutional:       Appearance: Normal appearance. She is well-developed.   HENT:      Head: Normocephalic and atraumatic.      Right Ear: External ear normal.      Left Ear: External ear normal.   Eyes:      General: Lids are normal. Vision grossly intact. No scleral icterus.        Right eye: No discharge.         Left eye: No discharge.      Extraocular Movements: Extraocular movements intact.      Conjunctiva/sclera: Conjunctivae normal.      Right eye: Right conjunctiva is not injected.      Left eye: Left conjunctiva is not injected.      Pupils: Pupils are equal, round, and reactive to light.   Neck:      Thyroid: No thyroid mass.      Trachea: Trachea normal.   Cardiovascular:      Rate and Rhythm: Normal rate and regular rhythm.      Heart sounds: Normal heart sounds. No murmur heard.    No gallop.   Pulmonary:      Effort: Pulmonary effort is normal.      Breath sounds: Normal breath sounds and air entry. No wheezing, rhonchi or rales.   Abdominal:      General: There is no distension.      Palpations: Abdomen is soft. There is no mass.      Tenderness: There is no abdominal tenderness. There is no right CVA tenderness, left CVA tenderness, guarding or rebound.   Musculoskeletal:         General: No tenderness or deformity. Normal range of motion.      Cervical back:  Full passive range of motion without pain, normal range of motion and neck supple.      Thoracic back: Normal.      Right lower leg: No edema.      Left lower leg: No edema.   Skin:     General: Skin is warm and dry.      Coloration: Skin is not jaundiced.      Findings: No rash.   Neurological:      Mental Status: She is alert and oriented to person, place, and time.      Sensory: Sensation is intact.      Motor: Motor function is intact.      Coordination: Coordination is intact.      Gait: Gait is intact.      Deep Tendon Reflexes: Reflexes are normal and symmetric.   Psychiatric:         Mood and Affect: Mood and affect normal.         Judgment: Judgment normal.          WALTER-7: Over the last two weeks, how often have you been bothered by the following problems?  Feeling nervous, anxious or on edge: Several days  Not being able to stop or control worrying: Not at all  Worrying too much about different things: Not at all  Trouble Relaxing: Nearly every day  Being so restless that it is hard to sit still: Nearly every day  Becoming easily annoyed or irritable: More than half the days  Feeling afraid as if something awful might happen: Not at all  WALTER 7 Total Score: 9  If you checked any problems, how difficult have these problems made it for you to do your work, take care of things at home, or get along with other people: Very difficult    PHQ-2 Depression Screening  Little interest or pleasure in doing things? 3-->nearly every day   Feeling down, depressed, or hopeless? 3-->nearly every day   PHQ-2 Total Score 13     PHQ-9 Depression Screening  Little interest or pleasure in doing things? 3-->nearly every day   Feeling down, depressed, or hopeless? 3-->nearly every day   Trouble falling or staying asleep, or sleeping too much? 0-->not at all   Feeling tired or having little energy? 3-->nearly every day   Poor appetite or overeating? 0-->not at all   Feeling bad about yourself - or that you are a failure or have  let yourself or your family down? 1-->several days   Trouble concentrating on things, such as reading the newspaper or watching television? 3-->nearly every day   Moving or speaking so slowly that other people could have noticed? Or the opposite - being so fidgety or restless that you have been moving around a lot more than usual? 0-->not at all   Thoughts that you would be better off dead, or of hurting yourself in some way? 0-->not at all   PHQ-9 Total Score 13   If you checked off any problems, how difficult have these problems made it for you to do your work, take care of things at home, or get along with other people? somewhat difficult      Result Review  Data Reviewed:                   Assessment and Plan      Diagnoses and all orders for this visit:    1. Poor concentration (Primary)    2. Mixed anxiety and depressive disorder    3. Other fatigue  -     CBC Auto Differential; Future  -     Comprehensive Metabolic Panel; Future  -     TSH; Future  -     Hemoglobin A1c; Future  -     Vitamin D 25 hydroxy; Future  -     Vitamin B12; Future    New patient. Here for evaluation of her symptoms. She is unsure if they are hormone related, anxiety/depression related or ADHD related. Patient c/o feeling like she can't concentrate, overwhelmed with thoughts in her head, thought processes, inability to relax, feels like her inside is \"fibrating or racing\". Feels like she used to be very productive and has motivation for this but she is \"overwhelmed all the time\". Feels anxious bc she is not able to complete her tasks. Sleeps well. She was diagnosed with PP depression after her second child. Family history of mood is not significant. MDQ negative today.  Admits to some irritability, mostly when she is overwhelmed. Denies any HI, SI.   Plan:   1. Set up initial evaluation with Dr Bean for adhd eval.   2. Mild anxiety/depression symptoms likely worse since adhd is not treated. Denies any HI, SI.   3. Will order lab today  as well and call with results.         Follow Up   Return for Next scheduled follow up.  Patient was given instructions and counseling regarding her condition or for health maintenance advice. Please see specific information pulled into the AVS if appropriate.

## 2023-01-10 LAB
25(OH)D3 SERPL-MCNC: 32 NG/ML (ref 30–100)
TSH SERPL DL<=0.05 MIU/L-ACNC: 1.06 UIU/ML (ref 0.27–4.2)
VIT B12 BLD-MCNC: 599 PG/ML (ref 211–946)

## 2023-01-11 ENCOUNTER — TELEPHONE (OUTPATIENT)
Dept: FAMILY MEDICINE CLINIC | Facility: CLINIC | Age: 38
End: 2023-01-11
Payer: MEDICAID

## 2023-01-11 NOTE — TELEPHONE ENCOUNTER
----- Message from JOSEPH Jean sent at 1/11/2023  7:57 AM CST -----  Please let pt know results: a1c non diabetic range, vit d normal-can supplement at this level with otc, tsh is wnl. B12 in normal range, cbc looks good, cmp ok.

## 2023-01-11 NOTE — PROGRESS NOTES
Please let pt know results: a1c non diabetic range, vit d normal-can supplement at this level with otc, tsh is wnl. B12 in normal range, cbc looks good, cmp ok.

## 2023-01-11 NOTE — TELEPHONE ENCOUNTER
Called patient and informed of a1c non diabetic range, vit d normal-can supplement at this level with otc, tsh is wnl. B12 in normal range, cbc looks good, cmp ok. VU.

## 2023-01-12 ENCOUNTER — PATIENT ROUNDING (BHMG ONLY) (OUTPATIENT)
Dept: FAMILY MEDICINE CLINIC | Facility: CLINIC | Age: 38
End: 2023-01-12
Payer: MEDICAID

## 2023-01-12 NOTE — PROGRESS NOTES
January 12, 2023    Hello, may I speak with Andria Ureña?    My name is Jyoti      I am  with [facility]  [facility]  [address] [place] [id number] [phone].    Before we get started may I verify your date of birth? 1985    I am calling to officially welcome you to our practice and ask about your recent visit. Is this a good time to talk? yes    Tell me about your visit with us. What things went well?  Oh it went great. [place] it was very surprising, I felt heard! The actually took the time to listen.        We're always looking for ways to make our patients' experiences even better. Do you have recommendations on ways we may improve?  no, not at all    Overall were you satisfied with your first visit to our practice? yes       I appreciate you taking the time to speak with me today. Is there anything else I can do for you? no      Thank you, and have a great day.

## 2023-01-26 ENCOUNTER — TELEPHONE (OUTPATIENT)
Dept: FAMILY MEDICINE CLINIC | Facility: CLINIC | Age: 38
End: 2023-01-26
Payer: MEDICAID

## 2023-01-26 ENCOUNTER — OFFICE VISIT (OUTPATIENT)
Dept: FAMILY MEDICINE CLINIC | Facility: CLINIC | Age: 38
End: 2023-01-26
Payer: MEDICAID

## 2023-01-26 ENCOUNTER — LAB (OUTPATIENT)
Dept: LAB | Facility: HOSPITAL | Age: 38
End: 2023-01-26
Payer: MEDICAID

## 2023-01-26 VITALS
WEIGHT: 154 LBS | HEIGHT: 61 IN | BODY MASS INDEX: 29.07 KG/M2 | HEART RATE: 93 BPM | OXYGEN SATURATION: 99 % | DIASTOLIC BLOOD PRESSURE: 84 MMHG | SYSTOLIC BLOOD PRESSURE: 127 MMHG

## 2023-01-26 DIAGNOSIS — F90.0 ATTENTION DEFICIT HYPERACTIVITY DISORDER (ADHD), PREDOMINANTLY INATTENTIVE TYPE: Primary | ICD-10-CM

## 2023-01-26 DIAGNOSIS — F90.0 ATTENTION DEFICIT HYPERACTIVITY DISORDER (ADHD), PREDOMINANTLY INATTENTIVE TYPE: ICD-10-CM

## 2023-01-26 DIAGNOSIS — G47.00 INSOMNIA DISORDER RELATED TO KNOWN ORGANIC FACTOR: ICD-10-CM

## 2023-01-26 LAB
AMPHET+METHAMPHET UR QL: NEGATIVE
AMPHETAMINES UR QL: NEGATIVE
BARBITURATES UR QL SCN: NEGATIVE
BENZODIAZ UR QL SCN: NEGATIVE
BUPRENORPHINE SERPL-MCNC: NEGATIVE NG/ML
CANNABINOIDS SERPL QL: NEGATIVE
COCAINE UR QL: NEGATIVE
METHADONE UR QL SCN: NEGATIVE
OPIATES UR QL: NEGATIVE
OXYCODONE UR QL SCN: NEGATIVE
PCP UR QL SCN: NEGATIVE
PROPOXYPH UR QL: NEGATIVE
TRICYCLICS UR QL SCN: NEGATIVE

## 2023-01-26 PROCEDURE — 99214 OFFICE O/P EST MOD 30 MIN: CPT | Performed by: PEDIATRICS

## 2023-01-26 PROCEDURE — 80306 DRUG TEST PRSMV INSTRMNT: CPT

## 2023-01-26 RX ORDER — QUETIAPINE FUMARATE 50 MG/1
50 TABLET, FILM COATED ORAL NIGHTLY
Qty: 30 TABLET | Refills: 2 | Status: SHIPPED | OUTPATIENT
Start: 2023-01-26 | End: 2023-02-22

## 2023-01-26 NOTE — PROGRESS NOTES
"Chief Complaint  ADHD (Initial)    Subjective    History of Present Illness      Patient presents to [facility] for   History of Present Illness  The patient endorses symptoms of ADHD including, but not limited to:       No: careless mistakes or not paying attention to directions or people of authority    Yes: trouble keeping attention on tasks and during hobbies or leisure activities    No: does not listen when spoken to directly    Yes: does not follow instructions and fails to finish homework chores daily tasks or duties at work    Yes: trouble organizing activities    Yes: avoids dislikes or doesn't want to do things that require mental effort for a long period of time    Yes: loses things needed for tasks    Yes: easily distracted    Yes: forgetful in daily activities    No: often fidgets with hands or feet or squirms in seat    Yes: often is restless    No: often gets up from seat and moves around when remaining in seat is expected    No: often has trouble enjoying leisure activities quietly    Yes: is often on the go or often acts is if driven by a motor    No: often talks excessively    No: often blurts out answers before questions have been finished    Yes: often has trouble waiting one's turn    No: often interrupts or intrudes on others      The patient has had symptoms of ADHD for 30 years, which have worsened over the last year.  The patient/guardian rates their ADHD at a 7/10 on a 0-10 scale, with 10 being the worst.       Review of Systems    I have reviewed and agree with the HPI information as above.  Vicente Bean MD     Objective   Vital Signs:   /84   Pulse 93   Ht 154.9 cm (61\")   Wt 69.9 kg (154 lb)   SpO2 99%   BMI 29.10 kg/m²     BMI is >= 25 and <30. (Overweight) The following options were offered after discussion;: nutrition counseling/recommendations      Physical Exam  Constitutional:       Appearance: Normal appearance. She is normal weight. "   Cardiovascular:      Rate and Rhythm: Normal rate and regular rhythm.      Heart sounds: Normal heart sounds.   Pulmonary:      Effort: Pulmonary effort is normal.      Breath sounds: Normal breath sounds.   Neurological:      Mental Status: She is alert.   Psychiatric:         Mood and Affect: Mood normal.         Behavior: Behavior normal.          WALTER-7:      PHQ-2 Depression Screening  Little interest or pleasure in doing things?     Feeling down, depressed, or hopeless?     PHQ-2 Total Score       PHQ-9 Depression Screening  Little interest or pleasure in doing things?     Feeling down, depressed, or hopeless?     Trouble falling or staying asleep, or sleeping too much?     Feeling tired or having little energy?     Poor appetite or overeating?     Feeling bad about yourself - or that you are a failure or have let yourself or your family down?     Trouble concentrating on things, such as reading the newspaper or watching television?     Moving or speaking so slowly that other people could have noticed? Or the opposite - being so fidgety or restless that you have been moving around a lot more than usual?     Thoughts that you would be better off dead, or of hurting yourself in some way?     PHQ-9 Total Score     If you checked off any problems, how difficult have these problems made it for you to do your work, take care of things at home, or get along with other people?        Result Review  Data Reviewed:                   Assessment and Plan      Diagnoses and all orders for this visit:    1. Attention deficit hyperactivity disorder (ADHD), predominantly inattentive type (Primary)  Assessment & Plan:  Psychological condition is newly identified.  Medication changes per orders.  Psychological condition  will be reassessed in 4 weeks     Vyvanse 30 after neg uds    Evaluation packet reviewed and discussed with patient.  Patients symptoms are impairing her work performance, self-esteem and ability to have  positive family kinetics.  We will start new medication pending negative UDS. PDMP reviewed and is clean.  Follow up in 1 month..      2. Insomnia disorder related to known organic factor  Assessment & Plan:  Mind racing insomnia  Seroquel 50            Follow Up   Return in about 4 weeks (around 2/23/2023) for Recheck.  Patient was given instructions and counseling regarding her condition or for health maintenance advice. Please see specific information pulled into the AVS if appropriate.       Answers for HPI/ROS submitted by the patient on 1/25/2023  Please describe your symptoms.: Discussing the possibility of having ADHD  Have you had these symptoms before?: Yes  How long have you been having these symptoms?: Greater than 2 weeks  What is the primary reason for your visit?: Other

## 2023-01-26 NOTE — ASSESSMENT & PLAN NOTE
Psychological condition is newly identified.  Medication changes per orders.  Psychological condition  will be reassessed in 4 weeks     Vyvanse 30 after neg uds    Evaluation packet reviewed and discussed with patient.  Patients symptoms are impairing her work performance, self-esteem and ability to have positive family kinetics.  We will start new medication pending negative UDS. PDMP reviewed and is clean.  Follow up in 1 month..

## 2023-01-26 NOTE — TELEPHONE ENCOUNTER
Called patient and informed of Urine drug screen negative medicine sent to the [facility] pharmacy. FORTUNATO

## 2023-01-26 NOTE — TELEPHONE ENCOUNTER
----- Message from Vicente Bean MD sent at 1/26/2023 11:04 AM CST -----  Urine drug screen negative medicine sent to the [facility]

## 2023-02-02 ENCOUNTER — TELEPHONE (OUTPATIENT)
Dept: FAMILY MEDICINE CLINIC | Facility: CLINIC | Age: 38
End: 2023-02-02
Payer: MEDICAID

## 2023-02-02 NOTE — TELEPHONE ENCOUNTER
Received fax request to pa pt's seroquel. Submitted pa via covermymeds. Received approval for 2/2/23-2/1/24.

## 2023-02-22 ENCOUNTER — OFFICE VISIT (OUTPATIENT)
Dept: FAMILY MEDICINE CLINIC | Facility: CLINIC | Age: 38
End: 2023-02-22
Payer: MEDICAID

## 2023-02-22 VITALS
WEIGHT: 153 LBS | RESPIRATION RATE: 20 BRPM | BODY MASS INDEX: 28.89 KG/M2 | HEIGHT: 61 IN | DIASTOLIC BLOOD PRESSURE: 83 MMHG | SYSTOLIC BLOOD PRESSURE: 120 MMHG | HEART RATE: 108 BPM

## 2023-02-22 DIAGNOSIS — F90.0 ATTENTION DEFICIT HYPERACTIVITY DISORDER (ADHD), PREDOMINANTLY INATTENTIVE TYPE: Primary | ICD-10-CM

## 2023-02-22 DIAGNOSIS — G47.00 INSOMNIA DISORDER RELATED TO KNOWN ORGANIC FACTOR: ICD-10-CM

## 2023-02-22 DIAGNOSIS — E66.3 OVERWEIGHT: ICD-10-CM

## 2023-02-22 PROCEDURE — 99214 OFFICE O/P EST MOD 30 MIN: CPT | Performed by: PEDIATRICS

## 2023-02-22 RX ORDER — QUETIAPINE FUMARATE 25 MG/1
25 TABLET, FILM COATED ORAL NIGHTLY
Qty: 30 TABLET | Refills: 0 | Status: SHIPPED | OUTPATIENT
Start: 2023-02-22

## 2023-02-22 RX ORDER — TRAZODONE HYDROCHLORIDE 50 MG/1
50 TABLET ORAL NIGHTLY
Qty: 30 TABLET | Refills: 0 | Status: SHIPPED | OUTPATIENT
Start: 2023-02-22 | End: 2023-02-22

## 2023-02-22 NOTE — ASSESSMENT & PLAN NOTE
Patient's (Body mass index is 28.91 kg/m².) indicates that they are overweight with health conditions that include none . Weight is unchanged. BMI is is above average; BMI management plan is completed. We discussed portion control and increasing exercise.

## 2023-02-22 NOTE — PROGRESS NOTES
"Chief Complaint  ADHD    Subjective    History of Present Illness      Patient presents to [facility] for   History of Present Illness  Pt is here today for a 1 month f/u after starting Vyvanse.  Pt would also like to discuss Seroquel and possibly trying something else    ADHD/Mood HPI    Visit for:  follow-up. Most recent visit was 1 month ago.  Interim changes to follow up on today: new medication: Vyvanse  Work/School Performance:  going well  Cognitive:  able to focus    Behavior  Hyperactivity: is not hyperactive  Impulsivity: no impulsivity and no unsafe behavior  Tasking: able to initiate tasks, able to complete tasks and able to mult-task    Social  ADHD social/impulsive symptoms:  not impatient, does not blurt out inappropriate comments and no excessive talking    Behavioral health  Behavior: no concerns  Emotional coping: demonstrates feelings of no concerns       Review of Systems    I have reviewed and agree with the HPI and ROS information as above.  Vicente Bean MD     Objective   Vital Signs:   /83   Pulse 108   Resp 20   Ht 154.9 cm (61\")   Wt 69.4 kg (153 lb)   BMI 28.91 kg/m²     BMI is >= 25 and <30. (Overweight) The following options were offered after discussion;: exercise counseling/recommendations and nutrition counseling/recommendations      Physical Exam  Vitals and nursing note reviewed.   Constitutional:       Appearance: Normal appearance. She is well-developed and overweight.   HENT:      Head: Normocephalic and atraumatic.      Right Ear: External ear normal.      Left Ear: External ear normal.      Nose: Nose normal. No nasal tenderness or congestion.      Mouth/Throat:      Lips: Pink. No lesions.      Mouth: Mucous membranes are moist. No oral lesions.      Dentition: Normal dentition.      Pharynx: Oropharynx is clear. No pharyngeal swelling, oropharyngeal exudate or posterior oropharyngeal erythema.   Eyes:      General: Lids are normal. " Vision grossly intact. No scleral icterus.        Right eye: No discharge.         Left eye: No discharge.      Extraocular Movements: Extraocular movements intact.      Conjunctiva/sclera: Conjunctivae normal.      Right eye: Right conjunctiva is not injected.      Left eye: Left conjunctiva is not injected.      Pupils: Pupils are equal, round, and reactive to light.   Cardiovascular:      Rate and Rhythm: Normal rate and regular rhythm.      Heart sounds: Normal heart sounds. No murmur heard.    No gallop.   Pulmonary:      Effort: Pulmonary effort is normal.      Breath sounds: Normal breath sounds and air entry. No wheezing, rhonchi or rales.   Musculoskeletal:         General: No tenderness or deformity. Normal range of motion.      Cervical back: Full passive range of motion without pain, normal range of motion and neck supple.      Right lower leg: No edema.      Left lower leg: No edema.   Skin:     General: Skin is warm and dry.      Coloration: Skin is not jaundiced.      Findings: No rash.   Neurological:      Mental Status: She is alert and oriented to person, place, and time.      Sensory: Sensation is intact.      Motor: Motor function is intact.      Coordination: Coordination is intact.      Gait: Gait is intact.   Psychiatric:         Attention and Perception: Attention normal.         Mood and Affect: Mood and affect normal.         Behavior: Behavior is not hyperactive. Behavior is cooperative.         Thought Content: Thought content normal.         Judgment: Judgment normal.          PHQ-2 Depression Screening  Little interest or pleasure in doing things? 0-->not at all   Feeling down, depressed, or hopeless? 0-->not at all   PHQ-2 Total Score 0     PHQ-9 Depression Screening  Little interest or pleasure in doing things? 0-->not at all   Feeling down, depressed, or hopeless? 0-->not at all   Trouble falling or staying asleep, or sleeping too much?     Feeling tired or having little energy?    "  Poor appetite or overeating?     Feeling bad about yourself - or that you are a failure or have let yourself or your family down?     Trouble concentrating on things, such as reading the newspaper or watching television?     Moving or speaking so slowly that other people could have noticed? Or the opposite - being so fidgety or restless that you have been moving around a lot more than usual?     Thoughts that you would be better off dead, or of hurting yourself in some way?     PHQ-9 Total Score 0   If you checked off any problems, how difficult have these problems made it for you to do your work, take care of things at home, or get along with other people?        Result Review  Data Reviewed:                   Assessment and Plan      Diagnoses and all orders for this visit:    1. Attention deficit hyperactivity disorder (ADHD), predominantly inattentive type (Primary)  Assessment & Plan:  Doing well.  We will continue same.      UDS negative.    Orders:  -     lisdexamfetamine (Vyvanse) 30 MG capsule; Take 1 capsule by mouth Every Morning for 30 days  Dispense: 30 capsule; Refill: 0  -     lisdexamfetamine (Vyvanse) 30 MG capsule; Take 1 capsule by mouth Every Morning for 30 days  Dispense: 30 capsule; Refill: 0    2. Insomnia disorder related to known organic factor  Assessment & Plan:  Patient would like to try something other than Seroquel for sleep. She states that it works but \"too well.\"  We will decrease dose.    Orders:  -     Discontinue: traZODone (DESYREL) 50 MG tablet; Take 1 tablet by mouth Every Night.  Dispense: 30 tablet; Refill: 0  -     QUEtiapine (SEROquel) 25 MG tablet; Take 1 tablet by mouth Every Night.  Dispense: 30 tablet; Refill: 0    3. Overweight  Assessment & Plan:  Patient's (Body mass index is 28.91 kg/m².) indicates that they are overweight with health conditions that include none . Weight is unchanged. BMI is is above average; BMI management plan is completed. We discussed portion " control and increasing exercise.             Follow Up   Return in about 3 months (around 5/22/2023) for Recheck.  Patient was given instructions and counseling regarding her condition or for health maintenance advice. Please see specific information pulled into the AVS if appropriate.       Answers for HPI/ROS submitted by the patient on 2/21/2023  Please describe your symptoms.: Follow up appointment to see how new medication is doing  Have you had these symptoms before?: No  How long have you been having these symptoms?: Greater than 2 weeks  Please list any medications you are currently taking for this condition.: Vyvanse  What is the primary reason for your visit?: Other

## 2023-02-22 NOTE — ASSESSMENT & PLAN NOTE
"Patient would like to try something other than Seroquel for sleep. She states that it works but \"too well.\"  We will decrease dose.  "

## 2023-03-15 ENCOUNTER — TELEPHONE (OUTPATIENT)
Dept: FAMILY MEDICINE CLINIC | Facility: CLINIC | Age: 38
End: 2023-03-15

## 2023-03-15 NOTE — TELEPHONE ENCOUNTER
Caller: Andria Ureña    Relationship: Self    Best call back number: [phone]    What is the best time to reach you: ANYTIME    Who are you requesting to speak with (clinical staff, provider,  specific staff member): CLINICAL    What was the call regarding: PATIENT STATES SHE HAS A RASH ON HER ARMS, FINGERS, AND EYELIDS AND SHE BELIEVES IT IS DUE TO THE NEW MEDICATION SHE WAS PRESCRIBED A MONTH AGO. PATIENT STATES IT SHOWED UP ABOUT 3 WEEKS AGO. SHE STATES IT REMINDS HER OF ECEZMA. SHE STATES SHE LOVES THE MEDICINE AND IT IS HELPING THE ONLY ISSUE IS THIS RASH. SHE WOULD LIKE TO STAY ON THIS MEDICATION BUT NEEDS SOME ADVICE ON WHAT TO DO. PLEASE ADVISE.     Do you require a callback: YES

## 2023-03-15 NOTE — TELEPHONE ENCOUNTER
Contacted pt back, verified name and . Advised appt need for below and offered to schedule. Pt vu and requested. Scheduled appt per preference.

## 2023-03-27 ENCOUNTER — TELEPHONE (OUTPATIENT)
Dept: FAMILY MEDICINE CLINIC | Facility: CLINIC | Age: 38
End: 2023-03-27
Payer: MEDICAID

## 2023-03-27 RX ORDER — FLUCONAZOLE 150 MG/1
TABLET ORAL
Qty: 2 TABLET | Refills: 0 | Status: SHIPPED | OUTPATIENT
Start: 2023-03-27

## 2023-03-27 NOTE — TELEPHONE ENCOUNTER
Sent med per provider below. Contacted pt, verified name and . Informed med sent. Pt vu and thanked staff.

## 2023-03-27 NOTE — TELEPHONE ENCOUNTER
Pt last seen 2/22/23 by Dr. Bean. Pt seen at Urgent Care and prescribed abx. Routing to Dr. Bean regarding below to advise.

## 2023-03-27 NOTE — TELEPHONE ENCOUNTER
Caller: Adelita Andria    Relationship: Self    Best call back number: [phone]    What medication are you requesting: SOMETHING STRONG FOR YEAST INFECTION     What are your current symptoms: HAO STATES SHE IS ANTIBIOTICS  FOR A SINUS INFECTION AND ALWAYS GETS A BAD YEAST INFECTION WHEN ON ANTIBIOTICS        Have you had these symptoms before:    [x] Yes  [] No    Have you been treated for these symptoms before:   [x] Yes  [] No    If a prescription is needed, what is your preferred pharmacy and phone number:      [facility] [address] [place] [address] [address] [facility] [phone] [facility] [phone]

## 2023-04-28 DIAGNOSIS — F90.0 ATTENTION DEFICIT HYPERACTIVITY DISORDER (ADHD), PREDOMINANTLY INATTENTIVE TYPE: ICD-10-CM

## 2023-04-28 NOTE — TELEPHONE ENCOUNTER
Caller: Andria Ureña    Relationship: Self    Best call back number: [phone]    Requested Prescriptions:   Requested Prescriptions     Pending Prescriptions Disp Refills   • lisdexamfetamine (Vyvanse) 30 MG capsule 30 capsule 0     Sig: Take 1 capsule by mouth Every Morning        Pharmacy where request should be sent: [facility] [address] [place] [address] [phone] PH - [phone]      Last office visit with prescribing clinician: 2/22/2023   Last telemedicine visit with prescribing clinician: 5/18/2023   Next office visit with prescribing clinician: 5/18/2023     Additional details provided by patient: COMPLETELY OUT    Does the patient have less than a 3 day supply:  [x] Yes  [] No    Would you like a call back once the refill request has been completed: [] Yes [] No    If the office needs to give you a call back, can they leave a voicemail: [] Yes [] No    Perla Sanchez Rep   04/28/23 09:13 CDT

## 2023-05-18 ENCOUNTER — OFFICE VISIT (OUTPATIENT)
Dept: FAMILY MEDICINE CLINIC | Facility: CLINIC | Age: 38
End: 2023-05-18
Payer: MEDICAID

## 2023-05-18 VITALS
WEIGHT: 149 LBS | OXYGEN SATURATION: 98 % | SYSTOLIC BLOOD PRESSURE: 121 MMHG | HEIGHT: 61 IN | DIASTOLIC BLOOD PRESSURE: 78 MMHG | HEART RATE: 88 BPM | BODY MASS INDEX: 28.13 KG/M2

## 2023-05-18 DIAGNOSIS — G47.00 INSOMNIA DISORDER RELATED TO KNOWN ORGANIC FACTOR: ICD-10-CM

## 2023-05-18 DIAGNOSIS — F90.0 ATTENTION DEFICIT HYPERACTIVITY DISORDER (ADHD), PREDOMINANTLY INATTENTIVE TYPE: Primary | ICD-10-CM

## 2023-05-18 PROCEDURE — 99214 OFFICE O/P EST MOD 30 MIN: CPT | Performed by: PEDIATRICS

## 2023-05-18 PROCEDURE — 1159F MED LIST DOCD IN RCRD: CPT | Performed by: PEDIATRICS

## 2023-05-18 PROCEDURE — 1160F RVW MEDS BY RX/DR IN RCRD: CPT | Performed by: PEDIATRICS

## 2023-05-18 RX ORDER — QUETIAPINE FUMARATE 25 MG/1
12.5 TABLET, FILM COATED ORAL NIGHTLY
Qty: 30 TABLET | Refills: 0 | Status: SHIPPED | OUTPATIENT
Start: 2023-05-18

## 2023-05-18 NOTE — PROGRESS NOTES
"Chief Complaint  ADHD and Insomnia (Seroquel causing \"hangover\" feeling the next day.)    Subjective    History of Present Illness      Patient presents to [facility] for   History of Present Illness  ADHD/Mood HPI    Visit for:  follow-up. Most recent visit was 3 months ago.  Interim changes to follow up on today: no change in medication  Work/School Performance:  going well  Cognitive:  able to focus    Behavior  Hyperactivity: is not hyperactive  Impulsivity: no impulsivity  Tasking: able to initiate tasks and able to complete tasks    Social  ADHD social/impulsive symptoms:  not impatient and no excessive talking    Behavioral health  Behavior: no concerns  Emotional coping: demonstrates feelings of no concerns      Seroquel causing \"hangover\" feeling the morning after. Pt has not been taking often as a result.         Review of Systems    I have reviewed and agree with the HPI and ROS information as above.  Vicente Bean MD     Objective   Vital Signs:   /78   Pulse 88   Ht 154.9 cm (61\")   Wt 67.6 kg (149 lb)   SpO2 98%   BMI 28.15 kg/m²     BMI is >= 25 and <30. (Overweight) The following options were offered after discussion;: exercise counseling/recommendations and nutrition counseling/recommendations      Physical Exam  Vitals and nursing note reviewed.   Constitutional:       Appearance: Normal appearance. She is well-developed and overweight.   HENT:      Head: Normocephalic and atraumatic.      Right Ear: Tympanic membrane, ear canal and external ear normal.      Left Ear: Tympanic membrane, ear canal and external ear normal.      Nose: Nose normal. No septal deviation, nasal tenderness or congestion.      Mouth/Throat:      Lips: Pink. No lesions.      Mouth: Mucous membranes are moist. No oral lesions.      Dentition: Normal dentition.      Pharynx: Oropharynx is clear. No pharyngeal swelling, oropharyngeal exudate or posterior oropharyngeal erythema.   Eyes: "      General: Lids are normal. Vision grossly intact. No scleral icterus.        Right eye: No discharge.         Left eye: No discharge.      Extraocular Movements: Extraocular movements intact.      Conjunctiva/sclera: Conjunctivae normal.      Right eye: Right conjunctiva is not injected.      Left eye: Left conjunctiva is not injected.      Pupils: Pupils are equal, round, and reactive to light.   Neck:      Thyroid: No thyroid mass.      Trachea: Trachea normal.   Cardiovascular:      Rate and Rhythm: Normal rate and regular rhythm.      Heart sounds: Normal heart sounds. No murmur heard.    No gallop.   Pulmonary:      Effort: Pulmonary effort is normal.      Breath sounds: Normal breath sounds and air entry. No wheezing, rhonchi or rales.   Abdominal:      General: There is no distension.      Palpations: Abdomen is soft. There is no mass.      Tenderness: There is no abdominal tenderness. There is no right CVA tenderness, left CVA tenderness, guarding or rebound.   Musculoskeletal:         General: No tenderness or deformity. Normal range of motion.      Cervical back: Full passive range of motion without pain, normal range of motion and neck supple.      Thoracic back: Normal.      Right lower leg: No edema.      Left lower leg: No edema.   Skin:     General: Skin is warm and dry.      Coloration: Skin is not jaundiced.      Findings: No rash.   Neurological:      Mental Status: She is alert and oriented to person, place, and time.      Sensory: Sensation is intact.      Motor: Motor function is intact.      Coordination: Coordination is intact.      Gait: Gait is intact.      Deep Tendon Reflexes: Reflexes are normal and symmetric.   Psychiatric:         Mood and Affect: Mood and affect normal.         Judgment: Judgment normal.             Result Review  Data Reviewed:                   Assessment and Plan      Diagnoses and all orders for this visit:    1. Attention deficit hyperactivity disorder (ADHD),  predominantly inattentive type (Primary)  Assessment & Plan:  Psychological condition is improving with treatment.  Medication changes per orders.  Psychological condition  will be reassessed in 3 months.    Orders:  -     lisdexamfetamine (Vyvanse) 30 MG capsule; Take 1 capsule by mouth Every Morning  Dispense: 30 capsule; Refill: 0    2. Insomnia disorder related to known organic factor  Assessment & Plan:  Gets sleepy on the dose  will use half of it.    Orders:  -     QUEtiapine (SEROquel) 25 MG tablet; Take 0.5 tablets by mouth Every Night.  Dispense: 30 tablet; Refill: 0            Follow Up   No follow-ups on file.  Patient was given instructions and counseling regarding her condition or for health maintenance advice. Please see specific information pulled into the AVS if appropriate.

## 2023-06-02 DIAGNOSIS — F90.0 ATTENTION DEFICIT HYPERACTIVITY DISORDER (ADHD), PREDOMINANTLY INATTENTIVE TYPE: ICD-10-CM

## 2023-06-02 NOTE — TELEPHONE ENCOUNTER
Caller: Andria Ureña    Relationship: Self    Best call back number: [phone]    Requested Prescriptions:   Requested Prescriptions     Pending Prescriptions Disp Refills   • lisdexamfetamine (Vyvanse) 30 MG capsule 30 capsule 0     Sig: Take 1 capsule by mouth Every Morning        Pharmacy where request should be sent: [address] - [phone] PH - [phone] FX     Last office visit with prescribing clinician: 5/18/2023   Last telemedicine visit with prescribing clinician: Visit date not found   Next office visit with prescribing clinician: Visit date not found     Additional details provided by patient: COMPLETELY OUT    Does the patient have less than a 3 day supply:  [x] Yes  [] No    Would you like a call back once the refill request has been completed: [x] Yes [] No    If the office needs to give you a call back, can they leave a voicemail: [x] Yes [] No    Perla Kay   06/02/23 08:56 CDT

## 2023-06-17 ENCOUNTER — APPOINTMENT (OUTPATIENT)
Dept: CT IMAGING | Facility: HOSPITAL | Age: 38
End: 2023-06-17
Payer: MEDICAID

## 2023-06-17 ENCOUNTER — HOSPITAL ENCOUNTER (EMERGENCY)
Facility: HOSPITAL | Age: 38
Discharge: HOME | End: 2023-06-17
Payer: MEDICAID

## 2023-06-17 VITALS
DIASTOLIC BLOOD PRESSURE: 80 MMHG | RESPIRATION RATE: 16 BRPM | TEMPERATURE: 98 F | HEIGHT: 61 IN | SYSTOLIC BLOOD PRESSURE: 127 MMHG | HEART RATE: 83 BPM | WEIGHT: 147 LBS | OXYGEN SATURATION: 99 % | BODY MASS INDEX: 27.75 KG/M2

## 2023-06-17 DIAGNOSIS — K52.9 GASTROENTERITIS: Primary | ICD-10-CM

## 2023-06-17 LAB
ALBUMIN SERPL-MCNC: 4.5 G/DL (ref 3.5–5.2)
ALBUMIN/GLOB SERPL: 1.3 G/DL
ALP SERPL-CCNC: 98 U/L (ref 39–117)
ALT SERPL W P-5'-P-CCNC: 12 U/L (ref 1–33)
ANION GAP SERPL CALCULATED.3IONS-SCNC: 11 MMOL/L (ref 5–15)
AST SERPL-CCNC: 16 U/L (ref 1–32)
B-HCG UR QL: NEGATIVE
BACTERIA UR QL AUTO: ABNORMAL /HPF
BILIRUB SERPL-MCNC: 0.3 MG/DL (ref 0–1.2)
BILIRUB UR QL STRIP: NEGATIVE
BUN SERPL-MCNC: 13 MG/DL (ref 6–20)
BUN/CREAT SERPL: 20.3 (ref 7–25)
CALCIUM SPEC-SCNC: 9.4 MG/DL (ref 8.6–10.5)
CHLORIDE SERPL-SCNC: 106 MMOL/L (ref 98–107)
CLARITY UR: CLEAR
CLUMPED PLATELETS: PRESENT
CO2 SERPL-SCNC: 22 MMOL/L (ref 22–29)
COLOR UR: YELLOW
CREAT SERPL-MCNC: 0.64 MG/DL (ref 0.57–1)
D-LACTATE SERPL-SCNC: 0.9 MMOL/L (ref 0.5–2)
DEPRECATED RDW RBC AUTO: 44.6 FL (ref 37–54)
EGFRCR SERPLBLD CKD-EPI 2021: 118.4 ML/MIN/1.73
EOSINOPHIL # BLD MANUAL: 0.52 10*3/MM3 (ref 0–0.4)
EOSINOPHIL NFR BLD MANUAL: 4 % (ref 0.3–6.2)
ERYTHROCYTE [DISTWIDTH] IN BLOOD BY AUTOMATED COUNT: 12.6 % (ref 12.3–15.4)
GLOBULIN UR ELPH-MCNC: 3.5 GM/DL
GLUCOSE SERPL-MCNC: 111 MG/DL (ref 65–99)
GLUCOSE UR STRIP-MCNC: NEGATIVE MG/DL
HCT VFR BLD AUTO: 47.4 % (ref 34–46.6)
HGB BLD-MCNC: 15 G/DL (ref 12–15.9)
HGB UR QL STRIP.AUTO: ABNORMAL
HOLD SPECIMEN: NORMAL
HYALINE CASTS UR QL AUTO: ABNORMAL /LPF
KETONES UR QL STRIP: ABNORMAL
LEUKOCYTE ESTERASE UR QL STRIP.AUTO: NEGATIVE
LIPASE SERPL-CCNC: 23 U/L (ref 13–60)
LYMPHOCYTES # BLD MANUAL: 1.17 10*3/MM3 (ref 0.7–3.1)
LYMPHOCYTES NFR BLD MANUAL: 6 % (ref 5–12)
MCH RBC QN AUTO: 30.4 PG (ref 26.6–33)
MCHC RBC AUTO-ENTMCNC: 31.6 G/DL (ref 31.5–35.7)
MCV RBC AUTO: 96 FL (ref 79–97)
MONOCYTES # BLD: 0.78 10*3/MM3 (ref 0.1–0.9)
NEUTROPHILS # BLD AUTO: 10.55 10*3/MM3 (ref 1.7–7)
NEUTROPHILS NFR BLD MANUAL: 74 % (ref 42.7–76)
NEUTS BAND NFR BLD MANUAL: 7 % (ref 0–5)
NEUTS VAC BLD QL SMEAR: ABNORMAL
NITRITE UR QL STRIP: NEGATIVE
PH UR STRIP.AUTO: <=5 [PH] (ref 5–8)
PLATELET # BLD AUTO: 271 10*3/MM3 (ref 140–450)
PMV BLD AUTO: 9.8 FL (ref 6–12)
POTASSIUM SERPL-SCNC: 4.4 MMOL/L (ref 3.5–5.2)
PROT SERPL-MCNC: 8 G/DL (ref 6–8.5)
PROT UR QL STRIP: NEGATIVE
RBC # BLD AUTO: 4.94 10*6/MM3 (ref 3.77–5.28)
RBC # UR STRIP: ABNORMAL /HPF
RBC MORPH BLD: NORMAL
REF LAB TEST METHOD: ABNORMAL
SODIUM SERPL-SCNC: 139 MMOL/L (ref 136–145)
SP GR UR STRIP: 1.03 (ref 1–1.03)
SQUAMOUS #/AREA URNS HPF: ABNORMAL /HPF
UROBILINOGEN UR QL STRIP: ABNORMAL
VARIANT LYMPHS NFR BLD MANUAL: 1 % (ref 0–5)
VARIANT LYMPHS NFR BLD MANUAL: 8 % (ref 19.6–45.3)
WBC # UR STRIP: ABNORMAL /HPF
WBC NRBC COR # BLD: 13.02 10*3/MM3 (ref 3.4–10.8)
WHOLE BLOOD HOLD COAG: NORMAL
WHOLE BLOOD HOLD SPECIMEN: NORMAL

## 2023-06-17 PROCEDURE — 85025 COMPLETE CBC W/AUTO DIFF WBC: CPT

## 2023-06-17 PROCEDURE — 80053 COMPREHEN METABOLIC PANEL: CPT

## 2023-06-17 PROCEDURE — 83605 ASSAY OF LACTIC ACID: CPT

## 2023-06-17 PROCEDURE — 85007 BL SMEAR W/DIFF WBC COUNT: CPT

## 2023-06-17 PROCEDURE — 25010000002 ONDANSETRON PER 1 MG

## 2023-06-17 PROCEDURE — 74177 CT ABD & PELVIS W/CONTRAST: CPT

## 2023-06-17 PROCEDURE — 81001 URINALYSIS AUTO W/SCOPE: CPT

## 2023-06-17 PROCEDURE — 81025 URINE PREGNANCY TEST: CPT

## 2023-06-17 PROCEDURE — 83690 ASSAY OF LIPASE: CPT

## 2023-06-17 PROCEDURE — 25510000001 IOPAMIDOL 61 % SOLUTION

## 2023-06-17 RX ORDER — SODIUM CHLORIDE 0.9 % (FLUSH) 0.9 %
10 SYRINGE (ML) INJECTION AS NEEDED
Status: DISCONTINUED | OUTPATIENT
Start: 2023-06-17 | End: 2023-06-17 | Stop reason: HOSPADM

## 2023-06-17 RX ORDER — ONDANSETRON 2 MG/ML
4 INJECTION INTRAMUSCULAR; INTRAVENOUS ONCE
Status: COMPLETED | OUTPATIENT
Start: 2023-06-17 | End: 2023-06-17

## 2023-06-17 RX ADMIN — IOPAMIDOL 100 ML: 612 INJECTION, SOLUTION INTRAVENOUS at 20:46

## 2023-06-17 RX ADMIN — ONDANSETRON 4 MG: 2 INJECTION INTRAMUSCULAR; INTRAVENOUS at 20:14

## 2023-06-17 RX ADMIN — SODIUM CHLORIDE, POTASSIUM CHLORIDE, SODIUM LACTATE AND CALCIUM CHLORIDE 1000 ML: 600; 310; 30; 20 INJECTION, SOLUTION INTRAVENOUS at 20:15

## 2023-06-18 LAB — HOLD SPECIMEN: NORMAL

## 2023-06-18 NOTE — DISCHARGE INSTRUCTIONS
It was very nice to meet you, Andria. Thank you for allowing us to take care of you today at [facility].    Your evaluation today did not show any emergent findings or have any emergent indications for admission to the hospital.     Please understand that an ER evaluation is just the start of your evaluation. We will do what we can, but we are often unable to fully figure out what is causing your symptoms from one evaluation. Thus, our primary goal is to determine whether you need to be evaluated in the hospital or if it is safe for you to go home and see other doctors such as a primary care physician or a specialist on an outpatient basis.     Like we discussed, it is VERY IMPORTANT that you follow up with your primary care doctor (call them to set up an appointment) within the next few days or as soon as possible so that you can be re-evaluated for improvement in your symptoms or for any other questions.     A copy of your results should be included in your paperwork. If you were prescribed any medications, please take them as directed or call us back with any questions.    Please return to the emergency room within 12-48 hours if you experience fever, chills, chest pain or shortness of breath, pain with inspiration/expiration, pain that travels to your arms, neck or back, nausea, vomiting, severe headache, tearing pain in your chest, dizziness, feel as though you are about to pass out, have any worsening symptoms, or any other concerns. Please stay well hydrated.

## 2023-06-18 NOTE — ED PROVIDER NOTES
Subjective   History of Present Illness  Patient is a 37-year-old female who presents emergency department with complaints of weakness, nausea, diarrhea, abdominal pain, near syncope.  She stated that all of her symptoms started today.  She states that she has had 12 episodes of diarrhea since lunchtime today.  She states that she also had 1 episode of vomiting.  She is complaining of some abdominal pain in the lower abdomen.  She denies any dysuria, urgency, frequency.  She states that she feels dehydrated.  She denies any fever, states that she had some chills.  She denies any sickness exposure.  She denies any cough, shortness of breath.  Denies any chest pain.  She states that her last menstrual cycle was on .  She did not have a syncopal episode or lose consciousness.  She felt weak after she was using the bathroom.    Review of Systems   Constitutional:  Positive for chills.   HENT: Negative.     Eyes: Negative.    Respiratory: Negative.     Cardiovascular: Negative.    Gastrointestinal:  Positive for abdominal pain, diarrhea, nausea and vomiting.   Endocrine: Negative.    Genitourinary: Negative.    Musculoskeletal: Negative.    Skin: Negative.    Allergic/Immunologic: Negative.    Neurological:  Positive for weakness.   Hematological: Negative.    Psychiatric/Behavioral: Negative.       Past Medical History:   Diagnosis Date    Anxiety     Depression     Migraine     Recurrent pregnancy loss, antepartum condition or complication        No Known Allergies    Past Surgical History:   Procedure Laterality Date     SECTION       SECTION N/A 2021    Procedure: REPEAT  SECTION WITHOUT TUBAL LIGATION;  Surgeon: Priya Moreno MD;  Location: [facility] LABOR DELIVERY;  Service: Obstetrics/Gynecology;  Laterality: N/A;    D & C WITH SUCTION      EXPLORATORY LAPAROTOMY         Family History   Problem Relation Age of Onset    Hyperlipidemia Mother     Cancer Father     Stroke  Maternal Grandfather     Stroke Paternal Grandfather     Hyperlipidemia Sister     Hyperlipidemia Sister     Miscarriages / Stillbirths Sister         Katelyn also had 2 miscarriages    Vision loss Sister     Hyperlipidemia Brother     Hyperlipidemia Brother        Social History     Socioeconomic History    Marital status:    Tobacco Use    Smoking status: Never    Smokeless tobacco: Never   Vaping Use    Vaping Use: Never used   Substance and Sexual Activity    Alcohol use: No    Drug use: No    Sexual activity: Yes     Partners: Male     Birth control/protection: Rhythm, Coitus interruptus           Objective   Physical Exam  Vitals and nursing note reviewed.   Constitutional:       Appearance: She is well-developed and normal weight. She is not toxic-appearing.   HENT:      Head: Normocephalic.   Cardiovascular:      Rate and Rhythm: Normal rate and regular rhythm.      Heart sounds: Normal heart sounds.   Pulmonary:      Effort: Pulmonary effort is normal.      Breath sounds: Normal breath sounds.   Abdominal:      General: Abdomen is flat. Bowel sounds are normal. There is no distension. There are no signs of injury.      Palpations: Abdomen is soft.      Tenderness: There is no abdominal tenderness.   Skin:     General: Skin is warm and dry.   Neurological:      General: No focal deficit present.      Mental Status: She is alert and oriented to person, place, and time.   Psychiatric:         Mood and Affect: Mood normal.         Behavior: Behavior normal.       Procedures           ED Course  ED Course as of 06/17/23 2243   Sat Jun 17, 2023   2103 CT abdomen pelvis with contrast reveals Fluid distended distal small bowel loops and proximal colon. Correlate for enteritis and diarrheal illness. 2 mm nonobstructing LEFT lower pole renal calculus. LEFT ovarian corpus luteal cyst. Read by radiologist.   [KR]      ED Course User Index  [KR] Sheela Shine APRN                                           Medical  Decision Making  Patient is a 37-year-old female who presents emergency department with complaints of weakness, nausea, diarrhea, abdominal pain, near syncope.  She stated that all of her symptoms started today.  She states that she has had 12 episodes of diarrhea since lunchtime today.  She states that she also had 1 episode of vomiting.  She is complaining of some abdominal pain in the lower abdomen.  She denies any dysuria, urgency, frequency.  She states that she feels dehydrated.  She denies any fever, states that she had some chills.  She denies any sickness exposure.  She denies any cough, shortness of breath.  Denies any chest pain.  She states that her last menstrual cycle was on June 7.  She did not have a syncopal episode or lose consciousness.  She felt weak after she was using the bathroom.    Patient was non-toxic and not-ill appearing on arrival. Vital signs stable.  Afebrile.    Patient's presentation raises suspicion for differentials including, but not limited to, gastroenteritis, colitis, diverticulitis, urinary tract infection.    Given this, Andria was placed on the monitor. Laboratory studies and imaging studies were ordered including CBC, CMP, lipase, lactic acid, urine pregnancy, urinalysis, CT abdomen pelvis with contrast.    Andria was given IV fluids and IV Zofran for symptomatic relief.    Imaging was reviewed by radiologist.  See results in ED course.    Labs were reviewed.  CMP unremarkable.  Normal lipase.  Normal lactic acid.  WBC 13.02.  Could likely be from gastroenteritis seen on the CT.  Urinalysis revealed 1+ blood, trace ketones, trace bacteria she does have a 2 mm nonobstructing left renal calculus.  She is not having any flank pain or urinary symptoms.  Urine pregnancy negative.  On re-evaluation, patient remained hemodynamically stable and appeared to be comfortable and in no acute distress.  She stated that she was feeling much better.    Given findings described above,  patient's presentation is most likely related to gastroenteritis given patient symptoms and CT findings. At this point, after reviewing the workup, I have a low suspicion for colitis or diverticulitis.  Low suspicion for obstruction given findings of CT abdomen pelvis.  Low suspicion for UTI.  She does not have any leukocytes or nitrates seen in her urinalysis.  She is not having any urinary symptoms.    I discussed all of the lab and imaging results with the patient during this visit in the emergency department. I answered all the questions regarding the emergency department evaluation, diagnosis, and treatment plan. We talked about how crucial it is for the patient to follow up by calling her primary care provider as soon as possible to schedule an appointment (by calling their office) for within the next few days or as soon as possible so that the symptoms can be reassessed to see if they have improved or to answer any additional questions. I also provided the patient with advice on returning safely and urged the patient to visit the emergency department right away if any worsening or new symptoms appeared within 12 to 48 hours. The patient and accompanying family members confirmed verbally that they understood and agreed with the discharge instructions.     The patient verbalized understanding of the discharge instructions and agreed with them.  Encouraged patient to stay well-hydrated.    Andria was discharged in stable condition.    Signed by:   JOSEPH Sethi 6/17/2023 21:11 CDT     Dragon disclaimer:  Part of this note may be an electronic transcription/translation of spoken language to printed text using the Dragon Dictation System.    Problems Addressed:  Gastroenteritis: complicated acute illness or injury    Amount and/or Complexity of Data Reviewed  Labs: ordered.  Radiology: ordered.    Risk  Prescription drug management.        Final diagnoses:   Gastroenteritis       ED Disposition  ED  Disposition       ED Disposition   Discharge    Condition   Stable    Comment   --               Provider, No Known  [facility]  [facility] [address]  [phone]    Schedule an appointment as soon as possible for a visit in 1 day      [facility] Emergency Department  [address] [place] [id number] [phone]  Go to   If symptoms worsen         Medication List      No changes were made to your prescriptions during this visit.            Sheela Shine, APRN  06/17/23 2246

## 2023-08-17 ENCOUNTER — TELEPHONE (OUTPATIENT)
Dept: FAMILY MEDICINE CLINIC | Facility: CLINIC | Age: 38
End: 2023-08-17
Payer: MEDICAID

## 2023-08-17 NOTE — TELEPHONE ENCOUNTER
Called to confirm an appointment with Lola Lock on Friday, August 18. No answer. Voicemail is not setup; unable to leave a message with a reminder.    Hub may read. No further action needed.

## 2023-09-04 PROCEDURE — 85025 COMPLETE CBC W/AUTO DIFF WBC: CPT | Performed by: FAMILY MEDICINE

## 2023-09-04 PROCEDURE — 80053 COMPREHEN METABOLIC PANEL: CPT | Performed by: FAMILY MEDICINE

## 2023-12-15 ENCOUNTER — HOSPITAL ENCOUNTER (OUTPATIENT)
Facility: HOSPITAL | Age: 38
Setting detail: OBSERVATION
Discharge: HOME | End: 2023-12-15
Attending: OBSTETRICS & GYNECOLOGY | Admitting: OBSTETRICS & GYNECOLOGY
Payer: MEDICAID

## 2023-12-15 VITALS
OXYGEN SATURATION: 100 % | DIASTOLIC BLOOD PRESSURE: 58 MMHG | SYSTOLIC BLOOD PRESSURE: 99 MMHG | WEIGHT: 163 LBS | HEART RATE: 90 BPM | HEIGHT: 61 IN | TEMPERATURE: 97.1 F | RESPIRATION RATE: 20 BRPM | BODY MASS INDEX: 30.78 KG/M2

## 2023-12-15 PROBLEM — Z34.90 PREGNANCY: Status: ACTIVE | Noted: 2023-12-15

## 2023-12-15 LAB
ALBUMIN SERPL-MCNC: 2.9 G/DL (ref 3.5–5.2)
ALBUMIN/GLOB SERPL: 1.1 G/DL
ALP SERPL-CCNC: 71 U/L (ref 39–117)
ALT SERPL W P-5'-P-CCNC: 16 U/L (ref 1–33)
ANION GAP SERPL CALCULATED.3IONS-SCNC: 9 MMOL/L (ref 5–15)
AST SERPL-CCNC: 20 U/L (ref 1–32)
BACTERIA UR QL AUTO: ABNORMAL /HPF
BASOPHILS # BLD AUTO: 0.02 10*3/MM3 (ref 0–0.2)
BASOPHILS NFR BLD AUTO: 0.2 % (ref 0–1.5)
BILIRUB SERPL-MCNC: <0.2 MG/DL (ref 0–1.2)
BILIRUB UR QL STRIP: NEGATIVE
BLOODY SPECIMEN?: NO
BUN SERPL-MCNC: 5 MG/DL (ref 6–20)
BUN/CREAT SERPL: 13.9 (ref 7–25)
CALCIUM SPEC-SCNC: 8.6 MG/DL (ref 8.6–10.5)
CHLORIDE SERPL-SCNC: 106 MMOL/L (ref 98–107)
CLARITY UR: CLEAR
CLUE CELLS SPEC QL WET PREP: ABNORMAL
CO2 SERPL-SCNC: 23 MMOL/L (ref 22–29)
COLOR UR: YELLOW
CREAT SERPL-MCNC: 0.36 MG/DL (ref 0.57–1)
DEPRECATED RDW RBC AUTO: 49.1 FL (ref 37–54)
EGFRCR SERPLBLD CKD-EPI 2021: 135.1 ML/MIN/1.73
EOSINOPHIL # BLD AUTO: 0.11 10*3/MM3 (ref 0–0.4)
EOSINOPHIL NFR BLD AUTO: 1.3 % (ref 0.3–6.2)
ERYTHROCYTE [DISTWIDTH] IN BLOOD BY AUTOMATED COUNT: 13.2 % (ref 12.3–15.4)
FIBRONECTIN FETAL VAG QL: NEGATIVE
FLUAV RNA RESP QL NAA+PROBE: NOT DETECTED
FLUBV RNA RESP QL NAA+PROBE: NOT DETECTED
GLOBULIN UR ELPH-MCNC: 2.6 GM/DL
GLUCOSE SERPL-MCNC: 89 MG/DL (ref 65–99)
GLUCOSE UR STRIP-MCNC: NEGATIVE MG/DL
HCT VFR BLD AUTO: 34.4 % (ref 34–46.6)
HGB BLD-MCNC: 10.5 G/DL (ref 12–15.9)
HGB UR QL STRIP.AUTO: NEGATIVE
HYALINE CASTS UR QL AUTO: ABNORMAL /LPF
HYDATID CYST SPEC WET PREP: ABNORMAL
IMM GRANULOCYTES # BLD AUTO: 0.12 10*3/MM3 (ref 0–0.05)
IMM GRANULOCYTES NFR BLD AUTO: 1.4 % (ref 0–0.5)
KETONES UR QL STRIP: NEGATIVE
LEUKOCYTE ESTERASE UR QL STRIP.AUTO: ABNORMAL
LYMPHOCYTES # BLD AUTO: 1.12 10*3/MM3 (ref 0.7–3.1)
LYMPHOCYTES NFR BLD AUTO: 13.3 % (ref 19.6–45.3)
MCH RBC QN AUTO: 31.1 PG (ref 26.6–33)
MCHC RBC AUTO-ENTMCNC: 30.5 G/DL (ref 31.5–35.7)
MCV RBC AUTO: 101.8 FL (ref 79–97)
MONOCYTES # BLD AUTO: 0.83 10*3/MM3 (ref 0.1–0.9)
MONOCYTES NFR BLD AUTO: 9.8 % (ref 5–12)
NEUTROPHILS NFR BLD AUTO: 6.23 10*3/MM3 (ref 1.7–7)
NEUTROPHILS NFR BLD AUTO: 74 % (ref 42.7–76)
NITRITE UR QL STRIP: NEGATIVE
NRBC BLD AUTO-RTO: 0 /100 WBC (ref 0–0.2)
PH UR STRIP.AUTO: 6.5 [PH] (ref 5–8)
PLATELET # BLD AUTO: 200 10*3/MM3 (ref 140–450)
PMV BLD AUTO: 9.5 FL (ref 6–12)
POTASSIUM SERPL-SCNC: 3.6 MMOL/L (ref 3.5–5.2)
PROT SERPL-MCNC: 5.5 G/DL (ref 6–8.5)
PROT UR QL STRIP: NEGATIVE
RBC # BLD AUTO: 3.38 10*6/MM3 (ref 3.77–5.28)
RBC # UR STRIP: ABNORMAL /HPF
REF LAB TEST METHOD: ABNORMAL
RSV RNA NPH QL NAA+NON-PROBE: NOT DETECTED
SARS-COV-2 RNA RESP QL NAA+PROBE: NOT DETECTED
SODIUM SERPL-SCNC: 138 MMOL/L (ref 136–145)
SP GR UR STRIP: 1.01 (ref 1–1.03)
SQUAMOUS #/AREA URNS HPF: ABNORMAL /HPF
T VAGINALIS SPEC QL WET PREP: ABNORMAL
UROBILINOGEN UR QL STRIP: ABNORMAL
WBC # UR STRIP: ABNORMAL /HPF
WBC NRBC COR # BLD AUTO: 8.43 10*3/MM3 (ref 3.4–10.8)
WBC SPEC QL WET PREP: ABNORMAL
YEAST GENITAL QL WET PREP: ABNORMAL

## 2023-12-15 PROCEDURE — 85025 COMPLETE CBC W/AUTO DIFF WBC: CPT | Performed by: OBSTETRICS & GYNECOLOGY

## 2023-12-15 PROCEDURE — 25810000003 LACTATED RINGERS SOLUTION: Performed by: OBSTETRICS & GYNECOLOGY

## 2023-12-15 PROCEDURE — 81001 URINALYSIS AUTO W/SCOPE: CPT | Performed by: OBSTETRICS & GYNECOLOGY

## 2023-12-15 PROCEDURE — G0463 HOSPITAL OUTPT CLINIC VISIT: HCPCS

## 2023-12-15 PROCEDURE — 87637 SARSCOV2&INF A&B&RSV AMP PRB: CPT | Performed by: OBSTETRICS & GYNECOLOGY

## 2023-12-15 PROCEDURE — 96365 THER/PROPH/DIAG IV INF INIT: CPT

## 2023-12-15 PROCEDURE — 80053 COMPREHEN METABOLIC PANEL: CPT | Performed by: OBSTETRICS & GYNECOLOGY

## 2023-12-15 PROCEDURE — 87210 SMEAR WET MOUNT SALINE/INK: CPT | Performed by: OBSTETRICS & GYNECOLOGY

## 2023-12-15 PROCEDURE — 25010000002 CEFTRIAXONE PER 250 MG: Performed by: OBSTETRICS & GYNECOLOGY

## 2023-12-15 PROCEDURE — G0378 HOSPITAL OBSERVATION PER HR: HCPCS

## 2023-12-15 PROCEDURE — 87086 URINE CULTURE/COLONY COUNT: CPT | Performed by: OBSTETRICS & GYNECOLOGY

## 2023-12-15 PROCEDURE — 82731 ASSAY OF FETAL FIBRONECTIN: CPT | Performed by: OBSTETRICS & GYNECOLOGY

## 2023-12-15 RX ORDER — SODIUM CHLORIDE 0.9 % (FLUSH) 0.9 %
10 SYRINGE (ML) INJECTION AS NEEDED
Status: DISCONTINUED | OUTPATIENT
Start: 2023-12-15 | End: 2023-12-15 | Stop reason: HOSPADM

## 2023-12-15 RX ORDER — SODIUM CHLORIDE 0.9 % (FLUSH) 0.9 %
10 SYRINGE (ML) INJECTION EVERY 12 HOURS SCHEDULED
Status: DISCONTINUED | OUTPATIENT
Start: 2023-12-15 | End: 2023-12-15 | Stop reason: HOSPADM

## 2023-12-15 RX ADMIN — CEFTRIAXONE SODIUM 2000 MG: 2 INJECTION, POWDER, FOR SOLUTION INTRAMUSCULAR; INTRAVENOUS at 06:24

## 2023-12-15 RX ADMIN — SODIUM CHLORIDE, POTASSIUM CHLORIDE, SODIUM LACTATE AND CALCIUM CHLORIDE 1000 ML: 600; 310; 30; 20 INJECTION, SOLUTION INTRAVENOUS at 04:53

## 2023-12-15 RX ADMIN — SODIUM CHLORIDE, POTASSIUM CHLORIDE, SODIUM LACTATE AND CALCIUM CHLORIDE 1000 ML: 600; 310; 30; 20 INJECTION, SOLUTION INTRAVENOUS at 05:38

## 2023-12-15 NOTE — NURSING NOTE
[facility] OB Medical Screening Exam Score Card    12/15/2023              BOX A -  Checklist findings CRITERIA Score  BOX C -   Checklist 1st Exam Score CRITERIA   Headache  No Yes = 1 0  Dilation 0   0-3 cm = 1  4-7 cm = 2  8-10 cm = 3   Vomiting No Yes = 1 0  Effacement 0 Greater than 50% = 2        Membranes:  TIME:  ROM N/A Ruptured = 3  Greater than 12 hours = 5   Visual Difficulties No Yes = 1 0  Contractions        Frequency none Less than 5 minutes   = 2    Greater than 5 minutes = 1   Epigastric Pain No Yes = 1 0  Duration N/A Greater than 40 seconds = 2        Intensity N/A Strong = 1   TOTAL Box A 3 or more = physician or CNM exam 0  Pattern N/A Regular = 1   BOX B -   Checklist Findings CRITERIA Score  Urge to Push N/A Yes = 3   PARA Yes If in labor and   Para 11 plus = 1 0  Maternal Temp 97.1 Greater than 100.4 = 5   Duration last labor less than 3 hours No If in labor and   Yes = 3 0  Maternal BP 99/58 Greater than 140/90 = 5  OR Less than 90/50 = 5   Prior  Yes If in labor and  Yes = 1 0  Maternal Respiration 16 Less than 12 = 2  OR  Greater than 20 = 2   Prenatal Care Yes If in labor and  No = 3 0            Significant Medical History N/A Yes = 7   Prior Fetal Demise Yes If in labor and  Yes = 3 0  Maternal Trauma N/A Yes = 10        Adama Bleeding N/A Yes = 7   Multiple Gestation No If in labor and  Yes = 3 0  Fetal Heart Rate 130 Baseline less than 120 = 5    OR Greater than 160 = 5    Non-reassuring strip = 10   Cerclage, Incompetent Cervix/Uterus No If in labor and  Yes = 3 0       Urinalysis Yes If greater than  100 = 1 0  Fetal Position N/A Non-vertex = 3  Prolapsed part = 10   Gestational Age Yes 20-34 weeks = 3    34-36 weeks = 2    37 plus weeks = 0 3  Fetal Station N/A Greater than 0 station = 3        TOTAL Box C 0 10 or more = physician or CNM exam   TOTAL Box B 6 or more = physician or CNM exam 3         Reviewed with obstetrician and orders received.      Glory Gomez,  RN  12/15/2023  07:16 CST

## 2023-12-16 ENCOUNTER — HOSPITAL ENCOUNTER (OUTPATIENT)
Facility: HOSPITAL | Age: 38
Setting detail: OBSERVATION
Discharge: HOME | End: 2023-12-16
Attending: OBSTETRICS & GYNECOLOGY | Admitting: OBSTETRICS & GYNECOLOGY
Payer: MEDICAID

## 2023-12-16 VITALS
HEART RATE: 92 BPM | WEIGHT: 163.4 LBS | SYSTOLIC BLOOD PRESSURE: 99 MMHG | BODY MASS INDEX: 30.85 KG/M2 | DIASTOLIC BLOOD PRESSURE: 57 MMHG | TEMPERATURE: 98.5 F | RESPIRATION RATE: 18 BRPM | HEIGHT: 61 IN

## 2023-12-16 LAB — BACTERIA SPEC AEROBE CULT: NO GROWTH

## 2023-12-16 PROCEDURE — 25810000003 LACTATED RINGERS PER 1000 ML: Performed by: OBSTETRICS & GYNECOLOGY

## 2023-12-16 PROCEDURE — G0378 HOSPITAL OBSERVATION PER HR: HCPCS

## 2023-12-16 PROCEDURE — G0463 HOSPITAL OUTPT CLINIC VISIT: HCPCS

## 2023-12-16 PROCEDURE — 25010000002 CEFTRIAXONE PER 250 MG: Performed by: OBSTETRICS & GYNECOLOGY

## 2023-12-16 PROCEDURE — 96365 THER/PROPH/DIAG IV INF INIT: CPT

## 2023-12-16 RX ORDER — SODIUM CHLORIDE, SODIUM LACTATE, POTASSIUM CHLORIDE, CALCIUM CHLORIDE 600; 310; 30; 20 MG/100ML; MG/100ML; MG/100ML; MG/100ML
125 INJECTION, SOLUTION INTRAVENOUS CONTINUOUS
Status: DISCONTINUED | OUTPATIENT
Start: 2023-12-16 | End: 2023-12-16 | Stop reason: HOSPADM

## 2023-12-16 RX ADMIN — SODIUM CHLORIDE, POTASSIUM CHLORIDE, SODIUM LACTATE AND CALCIUM CHLORIDE 125 ML/HR: 600; 310; 30; 20 INJECTION, SOLUTION INTRAVENOUS at 07:38

## 2023-12-16 RX ADMIN — CEFTRIAXONE SODIUM 2000 MG: 2 INJECTION, POWDER, FOR SOLUTION INTRAMUSCULAR; INTRAVENOUS at 07:33

## 2024-01-18 ENCOUNTER — TELEPHONE (OUTPATIENT)
Dept: FAMILY MEDICINE CLINIC | Facility: CLINIC | Age: 39
End: 2024-01-18
Payer: MEDICAID

## 2024-01-18 NOTE — TELEPHONE ENCOUNTER
"Pt called asking for advice on what to take for a suspected sinus infection while 7 months pregnant. She attempted to ask her OB but states, \"They were not very helpful.\" Recommend Mucinex 600mg q12h and Benadryl 25mg PRN HS. Pt vu all, no further questions.   "

## 2024-01-25 ENCOUNTER — HOSPITAL ENCOUNTER (EMERGENCY)
Facility: HOSPITAL | Age: 39
Discharge: HOME | End: 2024-01-25
Attending: EMERGENCY MEDICINE
Payer: MEDICAID

## 2024-01-25 VITALS
SYSTOLIC BLOOD PRESSURE: 110 MMHG | OXYGEN SATURATION: 99 % | BODY MASS INDEX: 32.19 KG/M2 | HEART RATE: 86 BPM | WEIGHT: 170.5 LBS | TEMPERATURE: 98.6 F | DIASTOLIC BLOOD PRESSURE: 68 MMHG | HEIGHT: 61 IN | RESPIRATION RATE: 18 BRPM

## 2024-01-25 DIAGNOSIS — Z3A.30 30 WEEKS GESTATION OF PREGNANCY: ICD-10-CM

## 2024-01-25 DIAGNOSIS — K52.9 GASTROENTERITIS: Primary | ICD-10-CM

## 2024-01-25 LAB
ADV 40+41 DNA STL QL NAA+NON-PROBE: NOT DETECTED
ALBUMIN SERPL-MCNC: 3.3 G/DL (ref 3.5–5.2)
ALBUMIN/GLOB SERPL: 1.1 G/DL
ALP SERPL-CCNC: 99 U/L (ref 39–117)
ALT SERPL W P-5'-P-CCNC: 12 U/L (ref 1–33)
ANION GAP SERPL CALCULATED.3IONS-SCNC: 12 MMOL/L (ref 5–15)
AST SERPL-CCNC: 16 U/L (ref 1–32)
ASTRO TYP 1-8 RNA STL QL NAA+NON-PROBE: NOT DETECTED
BACTERIA UR QL AUTO: ABNORMAL /HPF
BASOPHILS # BLD AUTO: 0.03 10*3/MM3 (ref 0–0.2)
BASOPHILS NFR BLD AUTO: 0.2 % (ref 0–1.5)
BILIRUB SERPL-MCNC: 0.2 MG/DL (ref 0–1.2)
BILIRUB UR QL STRIP: NEGATIVE
BUN SERPL-MCNC: 8 MG/DL (ref 6–20)
BUN/CREAT SERPL: 24.2 (ref 7–25)
C CAYETANENSIS DNA STL QL NAA+NON-PROBE: NOT DETECTED
C COLI+JEJ+UPSA DNA STL QL NAA+NON-PROBE: NOT DETECTED
CALCIUM SPEC-SCNC: 8.4 MG/DL (ref 8.6–10.5)
CHLORIDE SERPL-SCNC: 103 MMOL/L (ref 98–107)
CLARITY UR: ABNORMAL
CO2 SERPL-SCNC: 22 MMOL/L (ref 22–29)
COLOR UR: YELLOW
CREAT SERPL-MCNC: 0.33 MG/DL (ref 0.57–1)
CRYPTOSP DNA STL QL NAA+NON-PROBE: NOT DETECTED
DEPRECATED RDW RBC AUTO: 44.8 FL (ref 37–54)
E HISTOLYT DNA STL QL NAA+NON-PROBE: NOT DETECTED
EAEC PAA PLAS AGGR+AATA ST NAA+NON-PRB: NOT DETECTED
EC STX1+STX2 GENES STL QL NAA+NON-PROBE: NOT DETECTED
EGFRCR SERPLBLD CKD-EPI 2021: 138 ML/MIN/1.73
EOSINOPHIL # BLD AUTO: 0.14 10*3/MM3 (ref 0–0.4)
EOSINOPHIL NFR BLD AUTO: 1.2 % (ref 0.3–6.2)
EPEC EAE GENE STL QL NAA+NON-PROBE: NOT DETECTED
ERYTHROCYTE [DISTWIDTH] IN BLOOD BY AUTOMATED COUNT: 13.1 % (ref 12.3–15.4)
ETEC LTA+ST1A+ST1B TOX ST NAA+NON-PROBE: NOT DETECTED
G LAMBLIA DNA STL QL NAA+NON-PROBE: NOT DETECTED
GLOBULIN UR ELPH-MCNC: 3.1 GM/DL
GLUCOSE SERPL-MCNC: 94 MG/DL (ref 65–99)
GLUCOSE UR STRIP-MCNC: NEGATIVE MG/DL
HCT VFR BLD AUTO: 32.7 % (ref 34–46.6)
HGB BLD-MCNC: 10.8 G/DL (ref 12–15.9)
HGB UR QL STRIP.AUTO: NEGATIVE
HYALINE CASTS UR QL AUTO: ABNORMAL /LPF
IMM GRANULOCYTES # BLD AUTO: 0.25 10*3/MM3 (ref 0–0.05)
IMM GRANULOCYTES NFR BLD AUTO: 2.1 % (ref 0–0.5)
KETONES UR QL STRIP: ABNORMAL
LEUKOCYTE ESTERASE UR QL STRIP.AUTO: ABNORMAL
LIPASE SERPL-CCNC: 34 U/L (ref 13–60)
LYMPHOCYTES # BLD AUTO: 1.06 10*3/MM3 (ref 0.7–3.1)
LYMPHOCYTES NFR BLD AUTO: 8.8 % (ref 19.6–45.3)
MCH RBC QN AUTO: 31.1 PG (ref 26.6–33)
MCHC RBC AUTO-ENTMCNC: 33 G/DL (ref 31.5–35.7)
MCV RBC AUTO: 94.2 FL (ref 79–97)
MONOCYTES # BLD AUTO: 0.73 10*3/MM3 (ref 0.1–0.9)
MONOCYTES NFR BLD AUTO: 6.1 % (ref 5–12)
NEUTROPHILS NFR BLD AUTO: 81.6 % (ref 42.7–76)
NEUTROPHILS NFR BLD AUTO: 9.8 10*3/MM3 (ref 1.7–7)
NITRITE UR QL STRIP: NEGATIVE
NOROVIRUS GI+II RNA STL QL NAA+NON-PROBE: DETECTED
NRBC BLD AUTO-RTO: 0 /100 WBC (ref 0–0.2)
P SHIGELLOIDES DNA STL QL NAA+NON-PROBE: NOT DETECTED
PH UR STRIP.AUTO: 7 [PH] (ref 5–8)
PLATELET # BLD AUTO: 241 10*3/MM3 (ref 140–450)
PMV BLD AUTO: 9.3 FL (ref 6–12)
POTASSIUM SERPL-SCNC: 3.7 MMOL/L (ref 3.5–5.2)
PROT SERPL-MCNC: 6.4 G/DL (ref 6–8.5)
PROT UR QL STRIP: ABNORMAL
RBC # BLD AUTO: 3.47 10*6/MM3 (ref 3.77–5.28)
RBC # UR STRIP: ABNORMAL /HPF
REF LAB TEST METHOD: ABNORMAL
RVA RNA STL QL NAA+NON-PROBE: NOT DETECTED
S ENT+BONG DNA STL QL NAA+NON-PROBE: NOT DETECTED
SAPO I+II+IV+V RNA STL QL NAA+NON-PROBE: NOT DETECTED
SHIGELLA SP+EIEC IPAH ST NAA+NON-PROBE: NOT DETECTED
SODIUM SERPL-SCNC: 137 MMOL/L (ref 136–145)
SP GR UR STRIP: 1.02 (ref 1–1.03)
SQUAMOUS #/AREA URNS HPF: ABNORMAL /HPF
UROBILINOGEN UR QL STRIP: ABNORMAL
V CHOL+PARA+VUL DNA STL QL NAA+NON-PROBE: NOT DETECTED
V CHOLERAE DNA STL QL NAA+NON-PROBE: NOT DETECTED
WBC # UR STRIP: ABNORMAL /HPF
WBC NRBC COR # BLD AUTO: 12.01 10*3/MM3 (ref 3.4–10.8)
Y ENTEROCOL DNA STL QL NAA+NON-PROBE: NOT DETECTED

## 2024-01-25 PROCEDURE — 80053 COMPREHEN METABOLIC PANEL: CPT | Performed by: EMERGENCY MEDICINE

## 2024-01-25 PROCEDURE — 81001 URINALYSIS AUTO W/SCOPE: CPT | Performed by: EMERGENCY MEDICINE

## 2024-01-25 PROCEDURE — 87086 URINE CULTURE/COLONY COUNT: CPT | Performed by: EMERGENCY MEDICINE

## 2024-01-25 PROCEDURE — 87507 IADNA-DNA/RNA PROBE TQ 12-25: CPT | Performed by: EMERGENCY MEDICINE

## 2024-01-25 PROCEDURE — 83690 ASSAY OF LIPASE: CPT | Performed by: EMERGENCY MEDICINE

## 2024-01-25 PROCEDURE — 25810000003 SODIUM CHLORIDE 0.9 % SOLUTION: Performed by: EMERGENCY MEDICINE

## 2024-01-25 PROCEDURE — 99283 EMERGENCY DEPT VISIT LOW MDM: CPT

## 2024-01-25 PROCEDURE — 96360 HYDRATION IV INFUSION INIT: CPT

## 2024-01-25 PROCEDURE — 85025 COMPLETE CBC W/AUTO DIFF WBC: CPT | Performed by: EMERGENCY MEDICINE

## 2024-01-25 RX ORDER — SODIUM CHLORIDE 0.9 % (FLUSH) 0.9 %
10 SYRINGE (ML) INJECTION AS NEEDED
Status: DISCONTINUED | OUTPATIENT
Start: 2024-01-25 | End: 2024-01-25 | Stop reason: HOSPADM

## 2024-01-25 RX ORDER — SODIUM CHLORIDE 9 MG/ML
125 INJECTION, SOLUTION INTRAVENOUS CONTINUOUS
Status: DISCONTINUED | OUTPATIENT
Start: 2024-01-25 | End: 2024-01-25 | Stop reason: HOSPADM

## 2024-01-25 RX ADMIN — SODIUM CHLORIDE 125 ML/HR: 9 INJECTION, SOLUTION INTRAVENOUS at 10:43

## 2024-01-25 NOTE — ED PROVIDER NOTES
Subjective   History of Present Illness  Patient says that she started getting nauseated and having general malaise or general ill feeling last night.  She did not think the nausea was a big deal because she is pregnant at about 7 months gestation but this morning she vomited once has had the vomitus seem to have what she thought were some small white worms in it and that concerned her great deal.  She has also developed diarrhea this morning is been very watery.  She denies any appearance of worms in that.  She has had some chills but no definite fever.  She did go anywhere else has been sick with this.  Vaginal bleeding is a problem with the pregnancy but wanted it checked because of the appearance of worms make sure was nothing that would harm the fetus.    History provided by:  Patient   used: No    Vomiting  The primary symptoms include nausea, vomiting and diarrhea. The illness began yesterday. The problem has been gradually worsening.   The illness is also significant for chills. The illness does not include anorexia, dysphagia, odynophagia, bloating, constipation, tenesmus, back pain or itching. Associated medical issues do not include inflammatory bowel disease, GERD, gallstones, liver disease, alcohol abuse, PUD, gastric bypass, bowel resection, irritable bowel syndrome, hemorrhoids or diverticulitis.       Review of Systems   Constitutional:  Positive for chills.   HENT: Negative.     Respiratory: Negative.     Cardiovascular: Negative.    Gastrointestinal:  Positive for diarrhea, nausea and vomiting. Negative for anorexia, bloating, constipation and dysphagia.   Genitourinary: Negative.    Musculoskeletal: Negative.  Negative for back pain.   Skin: Negative.  Negative for itching.   Neurological: Negative.    Psychiatric/Behavioral: Negative.     All other systems reviewed and are negative.      Past Medical History:   Diagnosis Date    Anxiety     Depression     Migraine      Recurrent pregnancy loss, antepartum condition or complication        No Known Allergies    Past Surgical History:   Procedure Laterality Date     SECTION       SECTION N/A 2021    Procedure: REPEAT  SECTION WITHOUT TUBAL LIGATION;  Surgeon: Priya Moreno MD;  Location: [facility] LABOR DELIVERY;  Service: Obstetrics/Gynecology;  Laterality: N/A;    D & C WITH SUCTION      EXPLORATORY LAPAROTOMY         Family History   Problem Relation Age of Onset    Hyperlipidemia Mother     Cancer Father     Stroke Maternal Grandfather     Stroke Paternal Grandfather     Hyperlipidemia Sister     Hyperlipidemia Sister     Miscarriages / Stillbirths Sister         Katelyn also had 2 miscarriages    Vision loss Sister     Hyperlipidemia Brother     Hyperlipidemia Brother        Social History     Socioeconomic History    Marital status:    Tobacco Use    Smoking status: Never     Passive exposure: Never    Smokeless tobacco: Never   Vaping Use    Vaping Use: Never used   Substance and Sexual Activity    Alcohol use: No    Drug use: No    Sexual activity: Yes     Partners: Male     Birth control/protection: Rhythm, Coitus interruptus       Prior to Admission medications    Medication Sig Start Date End Date Taking? Authorizing Provider   Bonjesta 20-20 MG tablet controlled-release  23   Stefano Soares MD   Prenatal Vit-Fe Fumarate-FA (Prenatal -) 27-1 MG tablet tablet  23   Stefano Soares MD       Medications   sodium chloride 0.9 % flush 10 mL (has no administration in time range)   sodium chloride 0.9 % infusion (125 mL/hr Intravenous New Bag 24 1043)       Vitals:    24 0924   BP: 114/74   Pulse: 99   Resp: 20   Temp: 98.6 °F (37 °C)   SpO2: 98%         Objective   Physical Exam  Vitals and nursing note reviewed.   Constitutional:       Appearance: Normal appearance.   HENT:      Head: Normocephalic and atraumatic.      Mouth/Throat:      Mouth: Mucous  membranes are moist.      Pharynx: Oropharynx is clear.   Eyes:      Extraocular Movements: Extraocular movements intact.      Pupils: Pupils are equal, round, and reactive to light.   Cardiovascular:      Rate and Rhythm: Normal rate and regular rhythm.   Pulmonary:      Effort: Pulmonary effort is normal.      Breath sounds: Normal breath sounds.   Abdominal:      Palpations: Abdomen is soft.      Tenderness: There is no abdominal tenderness.      Comments: Gravid   Musculoskeletal:         General: Normal range of motion.      Cervical back: Normal range of motion and neck supple.   Skin:     General: Skin is warm and dry.   Neurological:      General: No focal deficit present.      Mental Status: She is alert and oriented to person, place, and time.   Psychiatric:         Mood and Affect: Mood normal.         Behavior: Behavior normal.         Procedures         Lab Results (last 24 hours)       Procedure Component Value Units Date/Time    CBC & Differential [[id number]]  (Abnormal) Collected: 01/25/24 1042    Specimen: Blood Updated: 01/25/24 1055    Narrative:      The following orders were created for panel order CBC & Differential.  Procedure                               Abnormality         Status                     ---------                               -----------         ------                     CBC Auto Differential[[id number]]        Abnormal            Final result                 Please view results for these tests on the individual orders.    Comprehensive Metabolic Panel [[id number]]  (Abnormal) Collected: 01/25/24 1042    Specimen: Blood Updated: 01/25/24 1123     Glucose 94 mg/dL      BUN 8 mg/dL      Creatinine 0.33 mg/dL      Sodium 137 mmol/L      Potassium 3.7 mmol/L      Chloride 103 mmol/L      CO2 22.0 mmol/L      Calcium 8.4 mg/dL      Total Protein 6.4 g/dL      Albumin 3.3 g/dL      ALT (SGPT) 12 U/L      AST (SGOT) 16 U/L      Alkaline Phosphatase 99 U/L      Total Bilirubin 0.2 mg/dL       Globulin 3.1 gm/dL      A/G Ratio 1.1 g/dL      BUN/Creatinine Ratio 24.2     Anion Gap 12.0 mmol/L      eGFR 138.0 mL/min/1.73     Narrative:      GFR Normal >60  Chronic Kidney Disease <60  Kidney Failure <15      Lipase [[id number]]  (Normal) Collected: 01/25/24 1042    Specimen: Blood Updated: 01/25/24 1123     Lipase 34 U/L     CBC Auto Differential [[id number]]  (Abnormal) Collected: 01/25/24 1042    Specimen: Blood Updated: 01/25/24 1055     WBC 12.01 10*3/mm3      RBC 3.47 10*6/mm3      Hemoglobin 10.8 g/dL      Hematocrit 32.7 %      MCV 94.2 fL      MCH 31.1 pg      MCHC 33.0 g/dL      RDW 13.1 %      RDW-SD 44.8 fl      MPV 9.3 fL      Platelets 241 10*3/mm3      Neutrophil % 81.6 %      Lymphocyte % 8.8 %      Monocyte % 6.1 %      Eosinophil % 1.2 %      Basophil % 0.2 %      Immature Grans % 2.1 %      Neutrophils, Absolute 9.80 10*3/mm3      Lymphocytes, Absolute 1.06 10*3/mm3      Monocytes, Absolute 0.73 10*3/mm3      Eosinophils, Absolute 0.14 10*3/mm3      Basophils, Absolute 0.03 10*3/mm3      Immature Grans, Absolute 0.25 10*3/mm3      nRBC 0.0 /100 WBC             No orders to display       ED Course          MDM  Number of Diagnoses or Management Options  Gastroenteritis: new and requires workup  Diagnosis management comments: Tell the patient that she probably has a viral gastroenteritis.  In her pregnancy should not cause vomiting and diarrhea is more likely be viral.  I think it is unlikely that she actually saw worms in her vomitus.  That may be in the stool but not normally in the stomach but we will check a stool sample just to make sure there is nothing bad going on there.  She is given us a specimen but she does not need to wait on the results.  I told her just get plenty fluids and this should gradually pass.  She is discharged in stable condition.       Amount and/or Complexity of Data Reviewed  Clinical lab tests: ordered and reviewed  Decide to obtain previous medical records or  to obtain history from someone other than the patient: yes    Risk of Complications, Morbidity, and/or Mortality  Presenting problems: moderate  Diagnostic procedures: moderate  Management options: moderate    Patient Progress  Patient progress: stable        Final diagnoses:   Gastroenteritis   30 weeks gestation of pregnancy          Devendra Armstrong Jr., MD  01/25/24 0946

## 2024-01-26 LAB — BACTERIA SPEC AEROBE CULT: NORMAL

## 2024-01-31 PROCEDURE — 0202U NFCT DS 22 TRGT SARS-COV-2: CPT | Performed by: NURSE PRACTITIONER

## 2024-03-04 ENCOUNTER — INITIAL PRENATAL (OUTPATIENT)
Dept: OBSTETRICS AND GYNECOLOGY | Age: 39
End: 2024-03-04
Payer: MEDICAID

## 2024-03-04 ENCOUNTER — TELEPHONE (OUTPATIENT)
Dept: OBSTETRICS AND GYNECOLOGY | Age: 39
End: 2024-03-04
Payer: MEDICAID

## 2024-03-04 VITALS — DIASTOLIC BLOOD PRESSURE: 76 MMHG | BODY MASS INDEX: 32.69 KG/M2 | SYSTOLIC BLOOD PRESSURE: 124 MMHG | WEIGHT: 173 LBS

## 2024-03-04 DIAGNOSIS — M54.50 LOW BACK PAIN DURING PREGNANCY, THIRD TRIMESTER: ICD-10-CM

## 2024-03-04 DIAGNOSIS — O09.523 MULTIGRAVIDA OF ADVANCED MATERNAL AGE IN THIRD TRIMESTER: ICD-10-CM

## 2024-03-04 DIAGNOSIS — Z3A.36 36 WEEKS GESTATION OF PREGNANCY: ICD-10-CM

## 2024-03-04 DIAGNOSIS — O26.893 LOW BACK PAIN DURING PREGNANCY, THIRD TRIMESTER: ICD-10-CM

## 2024-03-04 DIAGNOSIS — O34.219 HISTORY OF CESAREAN DELIVERY, CURRENTLY PREGNANT: Primary | ICD-10-CM

## 2024-03-04 LAB
GLUCOSE UR STRIP-MCNC: NEGATIVE MG/DL
PROT UR STRIP-MCNC: NEGATIVE MG/DL

## 2024-03-04 PROCEDURE — 99204 OFFICE O/P NEW MOD 45 MIN: CPT | Performed by: OBSTETRICS & GYNECOLOGY

## 2024-03-04 RX ORDER — FAMOTIDINE 10 MG
20 TABLET ORAL ONCE AS NEEDED
OUTPATIENT
Start: 2024-03-04

## 2024-03-04 RX ORDER — KETOROLAC TROMETHAMINE 15 MG/ML
30 INJECTION, SOLUTION INTRAMUSCULAR; INTRAVENOUS ONCE
OUTPATIENT
Start: 2024-03-04 | End: 2024-03-04

## 2024-03-04 RX ORDER — SODIUM CHLORIDE 9 MG/ML
40 INJECTION, SOLUTION INTRAVENOUS AS NEEDED
OUTPATIENT
Start: 2024-03-04

## 2024-03-04 RX ORDER — SODIUM CHLORIDE 0.9 % (FLUSH) 0.9 %
10 SYRINGE (ML) INJECTION EVERY 12 HOURS SCHEDULED
OUTPATIENT
Start: 2024-03-04

## 2024-03-04 RX ORDER — ACETAMINOPHEN 500 MG
1000 TABLET ORAL ONCE
OUTPATIENT
Start: 2024-03-04 | End: 2024-03-04

## 2024-03-04 RX ORDER — SODIUM CHLORIDE 0.9 % (FLUSH) 0.9 %
10 SYRINGE (ML) INJECTION AS NEEDED
OUTPATIENT
Start: 2024-03-04

## 2024-03-04 RX ORDER — OXYTOCIN/0.9 % SODIUM CHLORIDE 30/500 ML
999 PLASTIC BAG, INJECTION (ML) INTRAVENOUS ONCE
OUTPATIENT
Start: 2024-03-04

## 2024-03-04 RX ORDER — METHYLERGONOVINE MALEATE 0.2 MG/ML
200 INJECTION INTRAVENOUS ONCE AS NEEDED
OUTPATIENT
Start: 2024-03-04

## 2024-03-04 RX ORDER — FAMOTIDINE 10 MG/ML
20 INJECTION, SOLUTION INTRAVENOUS ONCE AS NEEDED
OUTPATIENT
Start: 2024-03-04

## 2024-03-04 RX ORDER — CYCLOBENZAPRINE HCL 10 MG
10 TABLET ORAL 3 TIMES DAILY PRN
Qty: 30 TABLET | Refills: 1 | Status: SHIPPED | OUTPATIENT
Start: 2024-03-04

## 2024-03-04 RX ORDER — ONDANSETRON 4 MG/1
4 TABLET, ORALLY DISINTEGRATING ORAL EVERY 6 HOURS PRN
OUTPATIENT
Start: 2024-03-04

## 2024-03-04 RX ORDER — SODIUM CHLORIDE, SODIUM LACTATE, POTASSIUM CHLORIDE, CALCIUM CHLORIDE 600; 310; 30; 20 MG/100ML; MG/100ML; MG/100ML; MG/100ML
125 INJECTION, SOLUTION INTRAVENOUS CONTINUOUS
OUTPATIENT
Start: 2024-03-04

## 2024-03-04 RX ORDER — ONDANSETRON 2 MG/ML
4 INJECTION INTRAMUSCULAR; INTRAVENOUS EVERY 6 HOURS PRN
OUTPATIENT
Start: 2024-03-04

## 2024-03-04 RX ORDER — HYDROMORPHONE HYDROCHLORIDE 1 MG/ML
0.5 INJECTION, SOLUTION INTRAMUSCULAR; INTRAVENOUS; SUBCUTANEOUS
OUTPATIENT
Start: 2024-03-04 | End: 2024-03-14

## 2024-03-04 RX ORDER — OXYTOCIN/0.9 % SODIUM CHLORIDE 30/500 ML
250 PLASTIC BAG, INJECTION (ML) INTRAVENOUS CONTINUOUS
OUTPATIENT
Start: 2024-03-04 | End: 2024-03-04

## 2024-03-04 RX ORDER — MISOPROSTOL 100 UG/1
800 TABLET ORAL ONCE AS NEEDED
OUTPATIENT
Start: 2024-03-04

## 2024-03-04 RX ORDER — CARBOPROST TROMETHAMINE 250 UG/ML
250 INJECTION, SOLUTION INTRAMUSCULAR AS NEEDED
OUTPATIENT
Start: 2024-03-04

## 2024-03-04 NOTE — PROGRESS NOTES
[facility], [facility]   HISTORY AND PHYSICAL  Subjective   Subjective     Chief Complaint:   Chief Complaint   Patient presents with    [facility]     Patient here to [facility] care, previous Dr. Moreno patient. Scheduled for  with tubal. Having increased pelvic discomfort.  No questions or concerns.  No bleeding, no regular contractions, no fluid leakage.        History of Present Illness  Andria Ureña is a 38 y.o. female  who presents for new OB. Transfer of prenatal care. Doing well. Low back pain/pelvic discomfort. No other complaints. Endorses appropriate fetal movement. Denies contractions, leakage of fluid or vaginal bleeding. H/o c/s x4. Planning repeat c/s on 3/27 with tubal. Reports current pregnancy is uncomplicated.     Review of Systems   Genitourinary:  Negative for decreased urine volume, difficulty urinating, dyspareunia, dysuria, enuresis, flank pain, frequency, genital sores, hematuria, menstrual problem, pelvic pain, urgency, vaginal bleeding, vaginal discharge and vaginal pain.   All other systems reviewed and are negative.       Personal History     OB History    Para Term  AB Living   7 4 4 0 2 4   SAB IAB Ectopic Molar Multiple Live Births   2 0 0 0 0 4      # Outcome Date GA Lbr Maxwell/2nd Weight Sex Type Anes PTL Lv   7 Current            6 Term 21 39w4d  3997 g (8 lb 13 oz) M CS-LTranv Spinal N EMILIANO      Birth Comments: hc 35 cm      Name: MARIA C UREÑA      Apgar1: 8  Apgar5: 9   5 SAB 19 16w0d       FD   4 SAB 18 16w0d       FD   3 Term 17 40w0d  3118 g (6 lb 14 oz) M CS-LTranv Spinal N EMILIANO   2 Term 12 40w0d  4054 g (8 lb 15 oz) M CS-LTranv Spinal N EMILIANO   1 Term 12/08/10 40w0d  3204 g (7 lb 1 oz) F CS-LTranv Spinal N EMILIANO      Complications: Failure to Progress in First Stage     Past Medical History:   Diagnosis Date    ADHD     Anxiety     Depression     Migraine     Recurrent pregnancy loss, antepartum condition or  complication      Past Surgical History:   Procedure Laterality Date     SECTION       SECTION N/A 2021    Procedure: REPEAT  SECTION WITHOUT TUBAL LIGATION;  Surgeon: Priya Moreno MD;  Location: [facility] LABOR DELIVERY;  Service: Obstetrics/Gynecology;  Laterality: N/A;    D & C WITH SUCTION      EXPLORATORY LAPAROTOMY         Home Medications:  Prenatal 27-1, cyclobenzaprine, and ferrous sulfate    Allergies:  She has No Known Allergies.    Objective    Objective     Vitals:   BP: (124)/(76) 124/76    Physical Exam  Vitals reviewed.   Constitutional:       General: She is not in acute distress.     Appearance: Normal appearance. She is not ill-appearing.   HENT:      Head: Normocephalic and atraumatic.      Nose: No congestion or rhinorrhea.   Eyes:      General: No scleral icterus.        Right eye: No discharge.         Left eye: No discharge.      Extraocular Movements: Extraocular movements intact.      Conjunctiva/sclera: Conjunctivae normal.   Pulmonary:      Effort: Pulmonary effort is normal. No accessory muscle usage or respiratory distress.   Musculoskeletal:      Right lower leg: No edema.      Left lower leg: No edema.   Skin:     General: Skin is warm and dry.      Coloration: Skin is not ashen, cyanotic or jaundiced.   Neurological:      General: No focal deficit present.      Mental Status: She is alert and oriented to person, place, and time.   Psychiatric:         Mood and Affect: Mood normal.         Behavior: Behavior is cooperative.         Result Review    POC Urinalysis Dipstick (2024 13:09)     Assessment & Plan   Assessment / Plan     Diagnoses and all orders for this visit:    1. History of  delivery, currently pregnant (Primary)  -     sodium chloride 0.9 % flush 10 mL  -     sodium chloride 0.9 % flush 10 mL  -     sodium chloride 0.9 % infusion 40 mL  -     lactated ringers bolus 1,000 mL  -     lactated ringers infusion  -      acetaminophen (TYLENOL) tablet 1,000 mg  -     ceFAZolin (ANCEF) 2 g in sodium chloride 0.9 % 100 mL IVPB  -     oxytocin (PITOCIN) 30 units in 0.9% sodium chloride 500 mL (premix)  -     oxytocin (PITOCIN) 30 units in 0.9% sodium chloride 500 mL (premix)  -     ketorolac (TORADOL) injection 30 mg  -     HYDROmorphone PF (DILAUDID) injection 0.5 mg  -     methylergonovine (METHERGINE) injection 200 mcg  -     carboprost (HEMABATE) injection 250 mcg  -     miSOPROStol (CYTOTEC) tablet 800 mcg  -     ondansetron ODT (ZOFRAN-ODT) disintegrating tablet 4 mg  -     ondansetron (ZOFRAN) injection 4 mg  -     famotidine (PEPCID) injection 20 mg  -     famotidine (PEPCID) tablet 20 mg  -     Case Request; Standing  -     Case Request    2. 36 weeks gestation of pregnancy  -     POC Urinalysis Dipstick  -     Chlamydia trachomatis, Neisseria gonorrhoeae, PCR w/ confirmation - Swab, Vagina  -     Strep B Screen - Swab, Vaginal/Rectum    3. Low back pain during pregnancy, third trimester  -     cyclobenzaprine (FLEXERIL) 10 MG tablet; Take 1 tablet by mouth 3 (Three) Times a Day As Needed for Muscle Spasms.  Dispense: 30 tablet; Refill: 1    4. Multigravida of advanced maternal age in third trimester    Other orders  -     Admit To Obstetrics Inpatient; Standing  -     Code Status and Medical Interventions:; Standing  -     Obtain Informed Consent; Standing  -     Vital Signs q 4 while awake; Standing  -     Vital Signs Per Hospital Policy; Standing  -     Continuous Fetal Monitoring With NST on Admission and Prior to Initiation of Oxytocin.; Standing  -     External Uterine Contraction Monitoring; Standing  -     Notify NICU to Attend Birth; Standing  -     Notify Provider (Specified); Standing  -     Notify Provider of Tachysystole (Per Hospital Algorithm); Standing  -     Notify Provider if Membranes Ruptured, Bleeding Greater Than 1 Pad Per Hour, Fetal Heart Tone Abnormality or Severe Pain; Standing  -     Bed Rest With  Bathroom Privileges; Standing  -     Insert Indwelling Urinary Catheter; Standing  -     Assess Need for Indwelling Urinary Catheter - Follow Removal Protocol; Standing  -     Urinary Catheter Care; Standing  -     Abdominal Prep With Clippers; Standing  -     Chlorhexadine Skin Prep Unless Otherwise Indicated; Standing  -     SCD (Sequential Compression Devices); Standing  -     POC Glucose Once; Standing  -     Document Gatorade Consumption Prior to Admission (Yes or No); Standing  -     NPO Diet NPO Type: Ice Chips; Standing  -     Inpatient Anesthesiology Consult; Standing  -     Type & Screen; Standing  -     CBC (No Diff); Standing  -     T Pallidum Antibody w/ reflex RPR (Syphilis); Standing  -     Insert Peripheral IV; Standing  -     Saline Lock & Maintain IV Access; Standing  -     Notify Provider (Specified); Standing  -     Vital Signs Per Hospital Policy; Standing  -     Strict Bed Rest; Standing  -     Fundal & Lochia Check; Standing  -     Fundal & Lochia Check; Standing  -     Indwelling Urinary Catheter; Standing  -     Diet: Regular/House; Fluid Consistency: Thin (IDDSI 0); Standing        Discussion:   Kick counts/labor precautions  RTC weekly until delivery  Plan repeat c/s 3/27 with tubal, consent signed with Dr. Moreno previously. Consents resigned today.   Records request for prenatal records for this pregnancy.   C/s Preparation and recovery discussed at length.   Questions answered. She expressed understanding.     Return in about 1 week (around 3/11/2024) for OB visit.    Vargas Dejesus MD

## 2024-03-04 NOTE — TELEPHONE ENCOUNTER
Pt is transferring from another OB office. She has questions and concerns regarding her  that was previously scheduled for this month. Scheduled pt to come in and see Dr. Dejesus today at 1300.

## 2024-03-09 LAB
C TRACH RRNA SPEC QL NAA+PROBE: NEGATIVE
GP B STREP DNA SPEC QL NAA+PROBE: NEGATIVE
N GONORRHOEA RRNA SPEC QL NAA+PROBE: NEGATIVE

## 2024-03-13 ENCOUNTER — ROUTINE PRENATAL (OUTPATIENT)
Dept: OBSTETRICS AND GYNECOLOGY | Age: 39
End: 2024-03-13
Payer: MEDICAID

## 2024-03-13 VITALS — SYSTOLIC BLOOD PRESSURE: 122 MMHG | DIASTOLIC BLOOD PRESSURE: 74 MMHG | BODY MASS INDEX: 33.03 KG/M2 | WEIGHT: 174.8 LBS

## 2024-03-13 DIAGNOSIS — O34.219 HISTORY OF CESAREAN DELIVERY, CURRENTLY PREGNANT: ICD-10-CM

## 2024-03-13 DIAGNOSIS — Z3A.37 37 WEEKS GESTATION OF PREGNANCY: Primary | ICD-10-CM

## 2024-03-13 LAB
GLUCOSE UR STRIP-MCNC: NEGATIVE MG/DL
PROT UR STRIP-MCNC: NEGATIVE MG/DL

## 2024-03-13 NOTE — PROGRESS NOTES
Pelvic pressure. Wants to be checked. Endorses appropriate fetal movement. Denies regular contractions, leakage of fluid, vaginal bleeding or discharge.    LABS SCANNED (2024 00:00)    ULTRASOUND SCANNED (2024 00:00)     RN chaperone present. Cervix closed.     Kick counts/Labor precautions  RTC 1 weeks    Diagnoses and all orders for this visit:    1. 37 weeks gestation of pregnancy (Primary)  -     POC Urinalysis Dipstick    2. History of  delivery, currently pregnant

## 2024-03-18 ENCOUNTER — ROUTINE PRENATAL (OUTPATIENT)
Dept: OBSTETRICS AND GYNECOLOGY | Age: 39
End: 2024-03-18
Payer: MEDICAID

## 2024-03-18 VITALS — SYSTOLIC BLOOD PRESSURE: 118 MMHG | BODY MASS INDEX: 33.25 KG/M2 | DIASTOLIC BLOOD PRESSURE: 78 MMHG | WEIGHT: 176 LBS

## 2024-03-18 DIAGNOSIS — O34.219 HISTORY OF CESAREAN DELIVERY, CURRENTLY PREGNANT: ICD-10-CM

## 2024-03-18 DIAGNOSIS — Z3A.38 38 WEEKS GESTATION OF PREGNANCY: Primary | ICD-10-CM

## 2024-03-18 LAB
GLUCOSE UR STRIP-MCNC: NEGATIVE MG/DL
PROT UR STRIP-MCNC: NEGATIVE MG/DL

## 2024-03-18 PROCEDURE — 99213 OFFICE O/P EST LOW 20 MIN: CPT | Performed by: OBSTETRICS & GYNECOLOGY

## 2024-03-18 NOTE — PROGRESS NOTES
No complaints. Endorses appropriate fetal movement. Denies regular contractions, leakage of fluid, vaginal bleeding or discharge.    Kick counts/Labor precautions  RTC 1 weeks    Diagnoses and all orders for this visit:    1. 38 weeks gestation of pregnancy (Primary)  -     POC Urinalysis Dipstick    2. History of  delivery, currently pregnant

## 2024-03-26 ENCOUNTER — HOSPITAL ENCOUNTER (OUTPATIENT)
Facility: HOSPITAL | Age: 39
Discharge: HOME | End: 2024-03-26
Attending: OBSTETRICS & GYNECOLOGY | Admitting: OBSTETRICS & GYNECOLOGY
Payer: MEDICAID

## 2024-03-26 ENCOUNTER — ROUTINE PRENATAL (OUTPATIENT)
Age: 39
End: 2024-03-26
Payer: MEDICAID

## 2024-03-26 VITALS
BODY MASS INDEX: 33.04 KG/M2 | WEIGHT: 175 LBS | HEIGHT: 61 IN | TEMPERATURE: 98.3 F | DIASTOLIC BLOOD PRESSURE: 79 MMHG | RESPIRATION RATE: 18 BRPM | HEART RATE: 99 BPM | SYSTOLIC BLOOD PRESSURE: 135 MMHG

## 2024-03-26 VITALS — SYSTOLIC BLOOD PRESSURE: 130 MMHG | DIASTOLIC BLOOD PRESSURE: 82 MMHG | BODY MASS INDEX: 33.41 KG/M2 | WEIGHT: 176.8 LBS

## 2024-03-26 DIAGNOSIS — O09.523 MULTIGRAVIDA OF ADVANCED MATERNAL AGE IN THIRD TRIMESTER: ICD-10-CM

## 2024-03-26 DIAGNOSIS — O36.8130 DECREASED FETAL MOVEMENTS IN THIRD TRIMESTER, SINGLE OR UNSPECIFIED FETUS: ICD-10-CM

## 2024-03-26 DIAGNOSIS — Z3A.39 39 WEEKS GESTATION OF PREGNANCY: Primary | ICD-10-CM

## 2024-03-26 DIAGNOSIS — O34.211 MATERNAL CARE DUE TO LOW TRANSVERSE UTERINE SCAR FROM PREVIOUS CESAREAN DELIVERY: ICD-10-CM

## 2024-03-26 LAB
GLUCOSE UR STRIP-MCNC: NEGATIVE MG/DL
PROT UR STRIP-MCNC: NEGATIVE MG/DL

## 2024-03-26 PROCEDURE — G0463 HOSPITAL OUTPT CLINIC VISIT: HCPCS

## 2024-03-26 PROCEDURE — 99213 OFFICE O/P EST LOW 20 MIN: CPT | Performed by: OBSTETRICS & GYNECOLOGY

## 2024-03-26 PROCEDURE — G0378 HOSPITAL OBSERVATION PER HR: HCPCS

## 2024-03-26 NOTE — H&P
[facility]  Andria Ureña  : 1985  MRN: [id number]  CSN: [id number]    History and Physical    Subjective   Andria Ureña is a 38 y.o. year old  with an Estimated Date of Delivery: 3/31/24 scheduled on 3/27 for  delivery due to previous c/s x 4 for repeat.with bilateral tubal ligation.  She is planning for sterilization at the time of the . Pt with complaints of decreased FM for which she was sent to L&D for NST.     Prenatal care has been with Dr. Vargas Dejesus and Jessica Moreno.  It has been benign.    OB History    Para Term  AB Living   7 4 4 0 2 4   SAB IAB Ectopic Molar Multiple Live Births   2 0 0 0 0 4      # Outcome Date GA Lbr Maxwell/2nd Weight Sex Type Anes PTL Lv   7 Current            6 Term 21 39w4d  3997 g (8 lb 13 oz) M CS-LTranv Spinal N EMILIANO      Birth Comments: hc 35 cm      Name: MARIA C UREÑA      Apgar1: 8  Apgar5: 9   5 SAB 19 16w0d       FD   4 SAB 18 16w0d       FD   3 Term 17 40w0d  3118 g (6 lb 14 oz) M CS-LTranv Spinal N EMILIANO   2 Term 12 40w0d  4054 g (8 lb 15 oz) M CS-LTranv Spinal N EMILIANO   1 Term 12/08/10 40w0d  3204 g (7 lb 1 oz) F CS-LTranv Spinal N EMILIANO      Complications: Failure to Progress in First Stage     Past Medical History:   Diagnosis Date    ADHD     Anxiety     Depression     Migraine     Recurrent pregnancy loss, antepartum condition or complication      Past Surgical History:   Procedure Laterality Date     SECTION       SECTION N/A 2021    Procedure: REPEAT  SECTION WITHOUT TUBAL LIGATION;  Surgeon: Priya Moreno MD;  Location: [facility] DELIVERY;  Service: Obstetrics/Gynecology;  Laterality: N/A;    D & C WITH SUCTION      EXPLORATORY LAPAROTOMY         Current Outpatient Medications:     cyclobenzaprine (FLEXERIL) 10 MG tablet, Take 1 tablet by mouth 3 (Three) Times a Day As Needed for Muscle Spasms., Disp: 30 tablet, Rfl: 1    FeroSul 325 (65 Fe)  MG tablet, , Disp: , Rfl:     Prenatal Vit-Fe Fumarate-FA (Prenatal ) 27-1 MG tablet tablet, , Disp: , Rfl:   Family History   Problem Relation Age of Onset    Hyperlipidemia Mother     Cancer Father     Stroke Maternal Grandfather     Stroke Paternal Grandfather     Hyperlipidemia Sister     Hyperlipidemia Sister     Miscarriages / Stillbirths Sister         Katelyn also had 2 miscarriages    Vision loss Sister     Hyperlipidemia Brother     Hyperlipidemia Brother        No Known Allergies  Social History    Tobacco Use      Smoking status: Never        Passive exposure: Never      Smokeless tobacco: Never    Review of Systems      Objective   /82   Wt 80.2 kg (176 lb 12.8 oz)   LMP 2023 (Approximate)   BMI 33.41 kg/m²   General: well developed; well nourished  no acute distress   Heart: regular rate and rhythm, S1, S2 normal, no murmur, click, rub or gallop   Lungs: breathing is unlabored   Abdomen: soft, non-tender; no masses  no umbilical or inguinal hernias are present  no hepato-splenomegaly     Cervix:   not checked  Prenatal Labs  Lab Results   Component Value Date    HGB 10.8 (L) 2024    HEPBSAG Negative 2020    ABO O 2021    RH Positive 2021    ABSCRN Negative 2021    [id number] Non-Reactive 2020    URINECX <25,000 CFU/mL Normal Urogenital Christina 2024       Recent Labs  Lab Results   Component Value Date    HGB 10.8 (L) 2024    HCT 32.7 (L) 2024    WBC 12.01 (H) 2024     2024           Assessment   IUP with an Estimated Date of Delivery: 3/31/24  Planned  section on 3/27/2024 for  previous c/s x 4 for repeat with tubal ligation .     Plan   Repeat  with bilateral salpingectomy    Neris Nelson MD  3/26/2024

## 2024-03-26 NOTE — PROGRESS NOTES
Decreased movement since last night. FHTs normal. Denies PIH symptoms. Denies LOF, ctx, VB.      /82   Wt 80.2 kg (176 lb 12.8 oz)   LMP 2023 (Approximate)   BMI 33.41 kg/m²    FHTs 128  Urine protein and glucose negative    Diagnoses and all orders for this visit:    1. 39 weeks gestation of pregnancy (Primary)  -     POC Urinalysis Dipstick    2. Maternal care due to low transverse uterine scar from previous  delivery  IUP at 39+ weeks gestation. To the OR in the am for repeat c/s with bilateral tubal ligation. Pt with decreased FM so sent to L&D for NST.   3. Multigravida of advanced maternal age in third trimester

## 2024-03-26 NOTE — H&P (VIEW-ONLY)
[facility]  Andria Ureña  : 1985  MRN: [id number]  CSN: [id number]    History and Physical    Subjective   Andria Ureña is a 38 y.o. year old  with an Estimated Date of Delivery: 3/31/24 scheduled on 3/27 for  delivery due to previous c/s x 4 for repeat.with bilateral tubal ligation.  She is planning for sterilization at the time of the . Pt with complaints of decreased FM for which she was sent to L&D for NST.     Prenatal care has been with Dr. Vargas Dejesus and Jessica Moreno.  It has been benign.    OB History    Para Term  AB Living   7 4 4 0 2 4   SAB IAB Ectopic Molar Multiple Live Births   2 0 0 0 0 4      # Outcome Date GA Lbr Maxwell/2nd Weight Sex Type Anes PTL Lv   7 Current            6 Term 21 39w4d  3997 g (8 lb 13 oz) M CS-LTranv Spinal N EMILIANO      Birth Comments: hc 35 cm      Name: MARIA C UREÑA      Apgar1: 8  Apgar5: 9   5 SAB 19 16w0d       FD   4 SAB 18 16w0d       FD   3 Term 17 40w0d  3118 g (6 lb 14 oz) M CS-LTranv Spinal N EMILIANO   2 Term 12 40w0d  4054 g (8 lb 15 oz) M CS-LTranv Spinal N EMILIANO   1 Term 12/08/10 40w0d  3204 g (7 lb 1 oz) F CS-LTranv Spinal N EMILIANO      Complications: Failure to Progress in First Stage     Past Medical History:   Diagnosis Date    ADHD     Anxiety     Depression     Migraine     Recurrent pregnancy loss, antepartum condition or complication      Past Surgical History:   Procedure Laterality Date     SECTION       SECTION N/A 2021    Procedure: REPEAT  SECTION WITHOUT TUBAL LIGATION;  Surgeon: Priya Moreno MD;  Location: [facility] DELIVERY;  Service: Obstetrics/Gynecology;  Laterality: N/A;    D & C WITH SUCTION      EXPLORATORY LAPAROTOMY         Current Outpatient Medications:     cyclobenzaprine (FLEXERIL) 10 MG tablet, Take 1 tablet by mouth 3 (Three) Times a Day As Needed for Muscle Spasms., Disp: 30 tablet, Rfl: 1    FeroSul 325 (65 Fe)  MG tablet, , Disp: , Rfl:     Prenatal Vit-Fe Fumarate-FA (Prenatal ) 27-1 MG tablet tablet, , Disp: , Rfl:   Family History   Problem Relation Age of Onset    Hyperlipidemia Mother     Cancer Father     Stroke Maternal Grandfather     Stroke Paternal Grandfather     Hyperlipidemia Sister     Hyperlipidemia Sister     Miscarriages / Stillbirths Sister         Katelyn also had 2 miscarriages    Vision loss Sister     Hyperlipidemia Brother     Hyperlipidemia Brother        No Known Allergies  Social History    Tobacco Use      Smoking status: Never        Passive exposure: Never      Smokeless tobacco: Never    Review of Systems      Objective   /82   Wt 80.2 kg (176 lb 12.8 oz)   LMP 2023 (Approximate)   BMI 33.41 kg/m²   General: well developed; well nourished  no acute distress   Heart: regular rate and rhythm, S1, S2 normal, no murmur, click, rub or gallop   Lungs: breathing is unlabored   Abdomen: soft, non-tender; no masses  no umbilical or inguinal hernias are present  no hepato-splenomegaly     Cervix:   not checked  Prenatal Labs  Lab Results   Component Value Date    HGB 10.8 (L) 2024    HEPBSAG Negative 2020    ABO O 2021    RH Positive 2021    ABSCRN Negative 2021    KOM4YYO9 Non-Reactive 2020    URINECX <25,000 CFU/mL Normal Urogenital Christina 2024       Recent Labs  Lab Results   Component Value Date    HGB 10.8 (L) 2024    HCT 32.7 (L) 2024    WBC 12.01 (H) 2024     2024           Assessment   IUP with an Estimated Date of Delivery: 3/31/24  Planned  section on 3/27/2024 for  previous c/s x 4 for repeat with tubal ligation .     Plan   Repeat  with bilateral salpingectomy    Neris Nelson MD  3/26/2024

## 2024-03-26 NOTE — NURSING NOTE
Patient presents to unit from MD office with complaints of decreased fetal movement.    Adriane Sanchez RN  15:26 CDT

## 2024-03-27 ENCOUNTER — ANESTHESIA (OUTPATIENT)
Dept: LABOR AND DELIVERY | Facility: HOSPITAL | Age: 39
End: 2024-03-27
Payer: MEDICAID

## 2024-03-27 ENCOUNTER — ANESTHESIA EVENT (OUTPATIENT)
Dept: LABOR AND DELIVERY | Facility: HOSPITAL | Age: 39
End: 2024-03-27
Payer: MEDICAID

## 2024-03-27 ENCOUNTER — HOSPITAL ENCOUNTER (INPATIENT)
Facility: HOSPITAL | Age: 39
LOS: 2 days | Discharge: HOME | End: 2024-03-29
Attending: OBSTETRICS & GYNECOLOGY | Admitting: OBSTETRICS & GYNECOLOGY
Payer: MEDICAID

## 2024-03-27 DIAGNOSIS — O34.219 HISTORY OF CESAREAN DELIVERY, CURRENTLY PREGNANT: ICD-10-CM

## 2024-03-27 DIAGNOSIS — G89.18 PAIN FOLLOWING CESAREAN DELIVERY: Primary | ICD-10-CM

## 2024-03-27 DIAGNOSIS — Z98.891 S/P REPEAT LOW TRANSVERSE C-SECTION: ICD-10-CM

## 2024-03-27 PROBLEM — O34.211 ENCOUNTER FOR MATERNAL CARE FOR LOW TRANSVERSE SCAR FROM REPEAT CESAREAN DELIVERY: Status: ACTIVE | Noted: 2024-03-27

## 2024-03-27 LAB
ABO GROUP BLD: NORMAL
BLD GP AB SCN SERPL QL: NEGATIVE
DEPRECATED RDW RBC AUTO: 46.8 FL (ref 37–54)
ERYTHROCYTE [DISTWIDTH] IN BLOOD BY AUTOMATED COUNT: 14.6 % (ref 12.3–15.4)
GLUCOSE BLDC GLUCOMTR-MCNC: 127 MG/DL (ref 70–130)
GLUCOSE BLDC GLUCOMTR-MCNC: 89 MG/DL (ref 70–130)
HCT VFR BLD AUTO: 31.4 % (ref 34–46.6)
HGB BLD-MCNC: 10.2 G/DL (ref 12–15.9)
MCH RBC QN AUTO: 29 PG (ref 26.6–33)
MCHC RBC AUTO-ENTMCNC: 32.5 G/DL (ref 31.5–35.7)
MCV RBC AUTO: 89.2 FL (ref 79–97)
PLATELET # BLD AUTO: 232 10*3/MM3 (ref 140–450)
PMV BLD AUTO: 9.7 FL (ref 6–12)
RBC # BLD AUTO: 3.52 10*6/MM3 (ref 3.77–5.28)
RH BLD: POSITIVE
T PALLIDUM IGG SER QL: NORMAL
T&S EXPIRATION DATE: NORMAL
WBC NRBC COR # BLD AUTO: 10.59 10*3/MM3 (ref 3.4–10.8)

## 2024-03-27 PROCEDURE — 82948 REAGENT STRIP/BLOOD GLUCOSE: CPT

## 2024-03-27 PROCEDURE — 25010000002 DROPERIDOL PER 5 MG

## 2024-03-27 PROCEDURE — 25010000002 BUPIVACAINE PF 0.75 % SOLUTION

## 2024-03-27 PROCEDURE — 25010000002 HYDROMORPHONE 1 MG/ML SOLUTION

## 2024-03-27 PROCEDURE — 25010000002 ONDANSETRON PER 1 MG

## 2024-03-27 PROCEDURE — 85027 COMPLETE CBC AUTOMATED: CPT | Performed by: OBSTETRICS & GYNECOLOGY

## 2024-03-27 PROCEDURE — 59514 CESAREAN DELIVERY ONLY: CPT | Performed by: OBSTETRICS & GYNECOLOGY

## 2024-03-27 PROCEDURE — 25810000003 LACTATED RINGERS SOLUTION: Performed by: OBSTETRICS & GYNECOLOGY

## 2024-03-27 PROCEDURE — 86901 BLOOD TYPING SEROLOGIC RH(D): CPT | Performed by: OBSTETRICS & GYNECOLOGY

## 2024-03-27 PROCEDURE — 86780 TREPONEMA PALLIDUM: CPT | Performed by: OBSTETRICS & GYNECOLOGY

## 2024-03-27 PROCEDURE — 86850 RBC ANTIBODY SCREEN: CPT | Performed by: OBSTETRICS & GYNECOLOGY

## 2024-03-27 PROCEDURE — 25010000002 DEXAMETHASONE PER 1 MG

## 2024-03-27 PROCEDURE — 0UT70ZZ RESECTION OF BILATERAL FALLOPIAN TUBES, OPEN APPROACH: ICD-10-PCS | Performed by: OBSTETRICS & GYNECOLOGY

## 2024-03-27 PROCEDURE — 25010000002 KETOROLAC TROMETHAMINE PER 15 MG: Performed by: OBSTETRICS & GYNECOLOGY

## 2024-03-27 PROCEDURE — 58611 LIGATE OVIDUCT(S) ADD-ON: CPT | Performed by: OBSTETRICS & GYNECOLOGY

## 2024-03-27 PROCEDURE — 25010000002 OXYTOCIN PER 10 UNITS

## 2024-03-27 PROCEDURE — 25010000002 CEFAZOLIN PER 500 MG: Performed by: OBSTETRICS & GYNECOLOGY

## 2024-03-27 PROCEDURE — 86900 BLOOD TYPING SEROLOGIC ABO: CPT | Performed by: OBSTETRICS & GYNECOLOGY

## 2024-03-27 PROCEDURE — 88302 TISSUE EXAM BY PATHOLOGIST: CPT | Performed by: OBSTETRICS & GYNECOLOGY

## 2024-03-27 PROCEDURE — 25810000003 LACTATED RINGERS PER 1000 ML: Performed by: OBSTETRICS & GYNECOLOGY

## 2024-03-27 PROCEDURE — 25010000002 METOCLOPRAMIDE PER 10 MG

## 2024-03-27 RX ORDER — OXYTOCIN/0.9 % SODIUM CHLORIDE 30/500 ML
125 PLASTIC BAG, INJECTION (ML) INTRAVENOUS ONCE AS NEEDED
Status: DISCONTINUED | OUTPATIENT
Start: 2024-03-27 | End: 2024-03-29 | Stop reason: HOSPADM

## 2024-03-27 RX ORDER — SODIUM CHLORIDE 0.9 % (FLUSH) 0.9 %
10 SYRINGE (ML) INJECTION AS NEEDED
Status: DISCONTINUED | OUTPATIENT
Start: 2024-03-27 | End: 2024-03-27 | Stop reason: HOSPADM

## 2024-03-27 RX ORDER — ACETAMINOPHEN 500 MG
1000 TABLET ORAL EVERY 6 HOURS
Status: DISPENSED | OUTPATIENT
Start: 2024-03-27 | End: 2024-03-28

## 2024-03-27 RX ORDER — OXYCODONE HYDROCHLORIDE 5 MG/1
10 TABLET ORAL EVERY 4 HOURS PRN
Status: DISCONTINUED | OUTPATIENT
Start: 2024-03-27 | End: 2024-03-29 | Stop reason: HOSPADM

## 2024-03-27 RX ORDER — ONDANSETRON 2 MG/ML
4 INJECTION INTRAMUSCULAR; INTRAVENOUS ONCE AS NEEDED
Status: COMPLETED | OUTPATIENT
Start: 2024-03-27 | End: 2024-03-27

## 2024-03-27 RX ORDER — ACETAMINOPHEN 500 MG
1000 TABLET ORAL ONCE
Status: COMPLETED | OUTPATIENT
Start: 2024-03-27 | End: 2024-03-27

## 2024-03-27 RX ORDER — ONDANSETRON 4 MG/1
4 TABLET, ORALLY DISINTEGRATING ORAL EVERY 8 HOURS PRN
Status: DISCONTINUED | OUTPATIENT
Start: 2024-03-27 | End: 2024-03-29 | Stop reason: HOSPADM

## 2024-03-27 RX ORDER — CARBOPROST TROMETHAMINE 250 UG/ML
250 INJECTION, SOLUTION INTRAMUSCULAR ONCE AS NEEDED
Status: DISCONTINUED | OUTPATIENT
Start: 2024-03-27 | End: 2024-03-29 | Stop reason: HOSPADM

## 2024-03-27 RX ORDER — METHYLERGONOVINE MALEATE 0.2 MG/ML
200 INJECTION INTRAVENOUS AS NEEDED
Status: DISCONTINUED | OUTPATIENT
Start: 2024-03-27 | End: 2024-03-29 | Stop reason: HOSPADM

## 2024-03-27 RX ORDER — SIMETHICONE 80 MG
80 TABLET,CHEWABLE ORAL 4 TIMES DAILY PRN
Status: DISCONTINUED | OUTPATIENT
Start: 2024-03-27 | End: 2024-03-29 | Stop reason: HOSPADM

## 2024-03-27 RX ORDER — DEXAMETHASONE SODIUM PHOSPHATE 4 MG/ML
INJECTION, SOLUTION INTRA-ARTICULAR; INTRALESIONAL; INTRAMUSCULAR; INTRAVENOUS; SOFT TISSUE AS NEEDED
Status: DISCONTINUED | OUTPATIENT
Start: 2024-03-27 | End: 2024-03-27 | Stop reason: SURG

## 2024-03-27 RX ORDER — EPHEDRINE SULFATE 50 MG/ML
INJECTION INTRAVENOUS AS NEEDED
Status: DISCONTINUED | OUTPATIENT
Start: 2024-03-27 | End: 2024-03-27 | Stop reason: SURG

## 2024-03-27 RX ORDER — BUPIVACAINE HCL/0.9 % NACL/PF 0.125 %
PLASTIC BAG, INJECTION (ML) EPIDURAL AS NEEDED
Status: DISCONTINUED | OUTPATIENT
Start: 2024-03-27 | End: 2024-03-27 | Stop reason: SURG

## 2024-03-27 RX ORDER — PRENATAL VIT/IRON FUM/FOLIC AC 27MG-0.8MG
1 TABLET ORAL DAILY
Status: DISCONTINUED | OUTPATIENT
Start: 2024-03-27 | End: 2024-03-29 | Stop reason: HOSPADM

## 2024-03-27 RX ORDER — ONDANSETRON 2 MG/ML
INJECTION INTRAMUSCULAR; INTRAVENOUS AS NEEDED
Status: DISCONTINUED | OUTPATIENT
Start: 2024-03-27 | End: 2024-03-27 | Stop reason: SURG

## 2024-03-27 RX ORDER — SODIUM CHLORIDE, SODIUM LACTATE, POTASSIUM CHLORIDE, CALCIUM CHLORIDE 600; 310; 30; 20 MG/100ML; MG/100ML; MG/100ML; MG/100ML
125 INJECTION, SOLUTION INTRAVENOUS CONTINUOUS
Status: DISCONTINUED | OUTPATIENT
Start: 2024-03-27 | End: 2024-03-29 | Stop reason: HOSPADM

## 2024-03-27 RX ORDER — DROPERIDOL 2.5 MG/ML
0.62 INJECTION, SOLUTION INTRAMUSCULAR; INTRAVENOUS ONCE AS NEEDED
Status: DISCONTINUED | OUTPATIENT
Start: 2024-03-27 | End: 2024-03-29 | Stop reason: HOSPADM

## 2024-03-27 RX ORDER — IBUPROFEN 600 MG/1
600 TABLET ORAL EVERY 6 HOURS
Status: DISCONTINUED | OUTPATIENT
Start: 2024-03-28 | End: 2024-03-29 | Stop reason: HOSPADM

## 2024-03-27 RX ORDER — OXYCODONE HYDROCHLORIDE 5 MG/1
5 TABLET ORAL EVERY 4 HOURS PRN
Status: DISCONTINUED | OUTPATIENT
Start: 2024-03-27 | End: 2024-03-29 | Stop reason: HOSPADM

## 2024-03-27 RX ORDER — ACETAMINOPHEN 325 MG/1
650 TABLET ORAL EVERY 6 HOURS
Status: DISCONTINUED | OUTPATIENT
Start: 2024-03-28 | End: 2024-03-29 | Stop reason: HOSPADM

## 2024-03-27 RX ORDER — ONDANSETRON 2 MG/ML
4 INJECTION INTRAMUSCULAR; INTRAVENOUS EVERY 6 HOURS PRN
Status: DISCONTINUED | OUTPATIENT
Start: 2024-03-27 | End: 2024-03-29 | Stop reason: HOSPADM

## 2024-03-27 RX ORDER — NALOXONE HCL 0.4 MG/ML
0.1 VIAL (ML) INJECTION
Status: DISCONTINUED | OUTPATIENT
Start: 2024-03-27 | End: 2024-03-29 | Stop reason: HOSPADM

## 2024-03-27 RX ORDER — SODIUM CHLORIDE 0.9 % (FLUSH) 0.9 %
10 SYRINGE (ML) INJECTION EVERY 12 HOURS SCHEDULED
Status: DISCONTINUED | OUTPATIENT
Start: 2024-03-27 | End: 2024-03-27 | Stop reason: HOSPADM

## 2024-03-27 RX ORDER — NALOXONE HCL 0.4 MG/ML
0.4 VIAL (ML) INJECTION
Status: ACTIVE | OUTPATIENT
Start: 2024-03-27 | End: 2024-03-28

## 2024-03-27 RX ORDER — KETOROLAC TROMETHAMINE 15 MG/ML
15 INJECTION, SOLUTION INTRAMUSCULAR; INTRAVENOUS EVERY 6 HOURS
Status: COMPLETED | OUTPATIENT
Start: 2024-03-27 | End: 2024-03-28

## 2024-03-27 RX ORDER — CITRIC ACID/SODIUM CITRATE 334-500MG
15 SOLUTION, ORAL ORAL ONCE
Status: COMPLETED | OUTPATIENT
Start: 2024-03-27 | End: 2024-03-27

## 2024-03-27 RX ORDER — MISOPROSTOL 200 UG/1
800 TABLET ORAL ONCE AS NEEDED
Status: DISCONTINUED | OUTPATIENT
Start: 2024-03-27 | End: 2024-03-29 | Stop reason: HOSPADM

## 2024-03-27 RX ORDER — SODIUM CHLORIDE 9 MG/ML
40 INJECTION, SOLUTION INTRAVENOUS AS NEEDED
Status: DISCONTINUED | OUTPATIENT
Start: 2024-03-27 | End: 2024-03-27 | Stop reason: HOSPADM

## 2024-03-27 RX ORDER — NALBUPHINE HYDROCHLORIDE 10 MG/ML
2.5 INJECTION, SOLUTION INTRAMUSCULAR; INTRAVENOUS; SUBCUTANEOUS EVERY 4 HOURS PRN
Status: DISCONTINUED | OUTPATIENT
Start: 2024-03-27 | End: 2024-03-27 | Stop reason: RX

## 2024-03-27 RX ORDER — OXYTOCIN 10 [USP'U]/ML
INJECTION, SOLUTION INTRAMUSCULAR; INTRAVENOUS AS NEEDED
Status: DISCONTINUED | OUTPATIENT
Start: 2024-03-27 | End: 2024-03-27 | Stop reason: SURG

## 2024-03-27 RX ORDER — HYDROMORPHONE HYDROCHLORIDE 1 MG/ML
0.5 INJECTION, SOLUTION INTRAMUSCULAR; INTRAVENOUS; SUBCUTANEOUS
Status: DISCONTINUED | OUTPATIENT
Start: 2024-03-27 | End: 2024-03-29 | Stop reason: HOSPADM

## 2024-03-27 RX ORDER — DOCUSATE SODIUM 100 MG/1
100 CAPSULE, LIQUID FILLED ORAL 2 TIMES DAILY PRN
Status: DISCONTINUED | OUTPATIENT
Start: 2024-03-27 | End: 2024-03-29 | Stop reason: HOSPADM

## 2024-03-27 RX ORDER — BUPIVACAINE HYDROCHLORIDE 7.5 MG/ML
INJECTION, SOLUTION EPIDURAL; RETROBULBAR
Status: COMPLETED | OUTPATIENT
Start: 2024-03-27 | End: 2024-03-27

## 2024-03-27 RX ORDER — FAMOTIDINE 10 MG/ML
20 INJECTION, SOLUTION INTRAVENOUS ONCE AS NEEDED
Status: COMPLETED | OUTPATIENT
Start: 2024-03-27 | End: 2024-03-27

## 2024-03-27 RX ORDER — METOCLOPRAMIDE HYDROCHLORIDE 5 MG/ML
10 INJECTION INTRAMUSCULAR; INTRAVENOUS ONCE
Status: COMPLETED | OUTPATIENT
Start: 2024-03-27 | End: 2024-03-27

## 2024-03-27 RX ADMIN — DROPERIDOL 0.62 MG: 2.5 INJECTION, SOLUTION INTRAMUSCULAR; INTRAVENOUS at 11:24

## 2024-03-27 RX ADMIN — KETOROLAC TROMETHAMINE 15 MG: 15 INJECTION, SOLUTION INTRAMUSCULAR; INTRAVENOUS at 21:13

## 2024-03-27 RX ADMIN — FAMOTIDINE 20 MG: 10 INJECTION INTRAVENOUS at 07:28

## 2024-03-27 RX ADMIN — EPHEDRINE SULFATE 15 MG: 50 INJECTION INTRAVENOUS at 07:56

## 2024-03-27 RX ADMIN — SODIUM CHLORIDE, POTASSIUM CHLORIDE, SODIUM LACTATE AND CALCIUM CHLORIDE: 600; 310; 30; 20 INJECTION, SOLUTION INTRAVENOUS at 07:45

## 2024-03-27 RX ADMIN — SODIUM CHLORIDE, POTASSIUM CHLORIDE, SODIUM LACTATE AND CALCIUM CHLORIDE: 600; 310; 30; 20 INJECTION, SOLUTION INTRAVENOUS at 07:38

## 2024-03-27 RX ADMIN — METOCLOPRAMIDE 10 MG: 5 INJECTION, SOLUTION INTRAMUSCULAR; INTRAVENOUS at 07:28

## 2024-03-27 RX ADMIN — EPHEDRINE SULFATE 5 MG: 50 INJECTION INTRAVENOUS at 08:33

## 2024-03-27 RX ADMIN — SODIUM CHLORIDE, POTASSIUM CHLORIDE, SODIUM LACTATE AND CALCIUM CHLORIDE: 600; 310; 30; 20 INJECTION, SOLUTION INTRAVENOUS at 08:39

## 2024-03-27 RX ADMIN — EPHEDRINE SULFATE 15 MG: 50 INJECTION INTRAVENOUS at 07:58

## 2024-03-27 RX ADMIN — BUPIVACAINE HYDROCHLORIDE 1.4 ML: 7.5 INJECTION, SOLUTION EPIDURAL; RETROBULBAR at 07:53

## 2024-03-27 RX ADMIN — ACETAMINOPHEN 1000 MG: 500 TABLET, FILM COATED ORAL at 18:34

## 2024-03-27 RX ADMIN — Medication 100 MCG: at 07:59

## 2024-03-27 RX ADMIN — OXYTOCIN 20 UNITS: 10 INJECTION INTRAVENOUS at 08:09

## 2024-03-27 RX ADMIN — HYDROMORPHONE HYDROCHLORIDE 100 MCG: 1 INJECTION, SOLUTION INTRAMUSCULAR; INTRAVENOUS; SUBCUTANEOUS at 07:53

## 2024-03-27 RX ADMIN — SODIUM CHLORIDE, POTASSIUM CHLORIDE, SODIUM LACTATE AND CALCIUM CHLORIDE 1000 ML: 600; 310; 30; 20 INJECTION, SOLUTION INTRAVENOUS at 06:23

## 2024-03-27 RX ADMIN — SODIUM CITRATE AND CITRIC ACID MONOHYDRATE 15 ML: 500; 334 SOLUTION ORAL at 07:27

## 2024-03-27 RX ADMIN — DEXAMETHASONE SODIUM PHOSPHATE 4 MG: 4 INJECTION INTRA-ARTICULAR; INTRALESIONAL; INTRAMUSCULAR; INTRAVENOUS; SOFT TISSUE at 07:56

## 2024-03-27 RX ADMIN — KETOROLAC TROMETHAMINE 15 MG: 15 INJECTION, SOLUTION INTRAMUSCULAR; INTRAVENOUS at 15:31

## 2024-03-27 RX ADMIN — ONDANSETRON 4 MG: 2 INJECTION INTRAMUSCULAR; INTRAVENOUS at 10:20

## 2024-03-27 RX ADMIN — ACETAMINOPHEN TAB 500 MG 1000 MG: 500 TAB at 06:24

## 2024-03-27 RX ADMIN — OXYTOCIN 20 UNITS: 10 INJECTION INTRAVENOUS at 08:40

## 2024-03-27 RX ADMIN — Medication 100 MCG: at 07:56

## 2024-03-27 RX ADMIN — ONDANSETRON 8 MG: 2 INJECTION INTRAMUSCULAR; INTRAVENOUS at 07:45

## 2024-03-27 RX ADMIN — CEFAZOLIN 2 G: 2 INJECTION, POWDER, FOR SOLUTION INTRAMUSCULAR; INTRAVENOUS at 06:29

## 2024-03-27 NOTE — OP NOTE
" SECTION REPEAT WITH TUBAL  Procedure Report    Patient Name:  Andria Ureña  YOB: 1985    Date of Surgery:  3/27/2024     Pre-op Diagnosis:   IUP at 39 weeks gestation  Previous c/s x 4  Undesired fertility    Post-op Diagnosis:  Same, delivered    Procedure:  Procedure(s):  REPEAT  SECTION WITH TUBAL LIGATION     Staff:  Surgeon(s):  Neris Nelson MD    Assistant:       Anesthesia: Spinal    Antibiotics: cefazolin 2 gms    Drains: Hendrix catheter    Estimated Blood Loss:  900 cc ,      Quantitative Blood Loss:  ,  931 cc    Urinary Output: 300 mL     Fluids: 1000 mL    Implants:    Nothing was implanted during the procedure    Specimen:          Specimens       ID Source Type Tests Collected By Collected At Frozen?    C Fallopian Tube, Left Tissue TISSUE PATHOLOGY EXAM   Neris Nelson MD 3/27/24 0830     D Fallopian Tube, Right Tissue TISSUE PATHOLOGY EXAM   Neris Nelson MD 3/27/24 0830               Delivery     Delivery: , Low Transverse     YOB: 2022    Time of Birth:  Gestational Age 8:06 AM   39w3d     Anesthesia: Spinal     Delivering clinician: Neris Nelson      Infant    Findings: female  infant     Infant observations: Weight: 3770 g (8 lb 5 oz)   Length: 20  in  Observations/Comments:        Apgars: 4  @ 1 minute /    9  @ 5 minutes         Placenta, Cord, and Fluid    Placenta delivered  Spontaneous  at   3/27/2024  8:12 AM     Cord: 3 vessels  present.   Nuchal Cord?  no   Cord blood obtained: Yes    Cord gases obtained:  No    Cord gas results: Venous:  No results found for: \"PHCVEN\", \"BECVEN\"    Arterial:  No results found for: \"PHCART\", \"BECART\"     Operative Findings:  Normal appearing uterus, ovaries, and fallopian tubes bilaterally.    Complications  none    Indications:    Andria Ureña is a 38 y.o. female who presents at 39+ weeks for a scheduled repeat .    Description of Procedure:   After " informed consent was obtained, the patient was taken to the operating room where  Spinal anesthesia was administered. A time out procedure was completed, confirming the correct patient and correct procedure. Perioperative antibiotics were administered. She was placed in the supine position with leftward tilt and her abdomen was prepped and draped in normal sterile fashion after a Hendrix catheter was placed by nursing staff.     After confirming adequate anesthesia, a Pfannenstiel incision was made in the skin with the surgical scalpel. Sharp dissection was carried out over the subsequent layers of tissue down to the fascia at midline. The fascial incision was extended laterally in both directions with fascial stretch. The rectus muscles were divided at midline and the peritoneum was then opened with blunt dissection at its upper margin. The peritoneum was then stretched by pulling caudally and cranially. All layers of the abdominal wall were manually stretched laterally to provide an opening as large as the skin incision.  A bladder blade was placed.     A bladder flap was created.  A low-transverse incision was made in the lower uterine segment using the scalpel. The uterine incision was extended bilaterally using blunt dissection in a cranial-caudad stretch. The amniotic sac was entered and the amniotic fluid was noted to be clear.     The surgeon’s hand was placed into the uterine cavity. The fetal head was identified, elevated into the abdomen and delivered through the uterine incision with the assistance of fundal pressure. The infant was examined for nuchal cord. No nuchal cord was identified .    The infant was delivered with traction and the assistance of fundal pressure. The infant’s oral and nasal passages were bulb suctioned. The infant was vigorous on the field. Delayed cord clamping for 30 seconds was observed. Following delayed cord clamping, the cord was clamped and cut x2. The infant was then passed  off the table to the awaiting infant care nurse for further care. Routine blood testing was obtained and the placenta was expressed.    Oxytocin was administered by IV infusion to enhance uterine contraction. The uterus was cleared of all clots and remaining products of conception, and then repaired in situ. The uterine incision was closed with #0-caprosyn in a running, locked fashion. A second imbricating stitch with a #0-caprosyn was used to obtain excellent hemostasis. Non-hemostatic areas were reinforced using #0-Vicryl in figure-of-eight stitches. Good hemostasis was confirmed.       The RIGHT fallopian tube was then grasped with a [place] clamp  2-0 chromic ties were used for a apolonia tubal ligation. Resulting pedicles were cauterized with the bovie.  Hemostasis was achieved without any further measures.  The LEFT fallopian tube was then addressed in a similar fashion.  Both ovaries were noted to be normal.    The pericolic gutters were cleared of all clots.  The fascia was reapproximated using #0-Vicryl in a running non-locked fashion. The subcutaneous layer was reapproximated with #2-O Plain in a running, non-locked fashion. The skin was reapproximated using #4-O Monocryl.     All sponge, needle, and instrument counts were noted to be correct ×3 at the end of the procedure.     Disposition  Mother to Mother Baby/Postpartum  in stable condition currently.  Baby to NICU  in stable condition currently.    Neris Nelson MD     Date: 3/27/2024  Time: 08:48 CDT

## 2024-03-27 NOTE — LACTATION NOTE
"Mother's Name: Andria Phone #: [phone]  Infant Name: Shani  : 3/27/24  Gestation: 39w3d  Day of life:  Birth weight: 8-5 (3770g) Discharge weight:  Weight Loss:   24 hour Summary of Feeds:  Voids:  Stools:  Assistive devices (shields, shells, etc):  Significant Maternal history: , C/S, AMA, ADHD, Migraines, Miscarriages, D&C, Anxiety, Depression  Maternal Concerns: None at this time  Maternal Goal: Breastfeed-Attempted to breastfeed all children,  last child for 18 months w/o any difficulties   Mother's Medications: PNV, FE  Breastpump for home: Old plug in pump. Wants hands free pump. Rx faxed to [facility].  Ped follow up appt:     Patient reports infant is breastfeeding \"pretty well\". States she  her other children, improving with each child, and she  her last for 18 months. Much encouragement provided for this. Initial breastfeeding education provided as well. Discussed expected weight loss/output, skin to skin, deep latching techniques, and hand expressing/breast compressions with feeds to increase transfer. Infant skin to skin with patient, bobbing head, trying to latch during visit. Assistance with deep latching offered. Patient declined. Placed lactation belt phone number on communication board. Lactation support offered. Questions denied at this time.    Instructed patient our lactation team is here for continued support throughout their breastfeeding journey. Our team has encouraged patient to call with any questions or concerns that may arise after discharge.    Breastfeeding and Diaper Chart  Check List for Essentials of Positioning And Latch-on handout provided by Lactation Education Resources  Hand Expression handout provided by Lactation Education Resources  Five Keys to Successful Breastfeeding handout provided by Lactation Education Resources    The Many Benefits if Breastfeeding handout given  Breastfeeding saves time  *Breastfeeding allows you to calm or " feed your baby immediately, which leads to a happier baby who cries less  *There is nothing to buy, prepare, or maintain.There is nothing to clean or sterilize.  Breastfeeding builds a mothers confidence  *She knows all her baby needs to thrive is her!  Breastfeeding saves Money  *There is no formula to buy and healthier breast fed babies have less medical costs  Healthy Mom/Healthy baby  * babies get sick less often, and when they do they are usually sick less severely and for a shorter time  * babies have fewer ear infections  * babies have fewer allergies  *Mothers who breastfeed have a lower risk for cancer, osteoporosis, anemia, high blood pressure, obesity, and Type ll diabetes  *Mothers miss less work days with sick babies  Breast fed babies have a better dental health  * babies have better jaw development which requires lest orthodontic work  *Breast milk does not promote cavities  * babies can nurse at night without worry of tooth decay  Breastfeeding allows a baby to reach his full IQ potential  *The longer a baby is breast fed, the better their brain development  Breast fed babies and moms are more relaxed  *The hormones released during breastfeeding have a calming effect on mothers  *Breastfeeding requires mom to take a break; this may help mom get more rest after delivery  *Breastfeeding is quicker than preparing formula which allows mom and baby to get back to sleep faster  *Breastfeeding promotes bonding and allows mom to learn babies cues and care needs more quickly  Breastfeeding cleanup is easier  *The bowel movements and spit up of breast fed babies doesn't smell as bad  *Spit-up of breast fed babies doesn't stain clothing  Getting out of the hourse is easier  *No formula bottles to prepare and carry safely   *No time restraints due to worry about what baby will eat  *No worries about warming a bottle or finding safe water to prepare  bottles  Breastfeeding mother get their bodies back sooner  *The uterus shrinks more quickly and completely, which allows a flatter tummy  *Breastfeeding burns 400-500 calories a day; making milk torches stored fat!  Breastfeeding is better for the environment  *There is no trash to dispose of after breastfeeding  *There is no production facility to produce breast milk; moms body does it all without the pollution of a factory      Your Guide to Breastfeeding Booklet by [employer], www.[employer]      Safe Storage of Breastmilk magnet: childbirthgraphics.[employer]    Educational Breastfeeding Videos on   YouTube  (length of video in minutes)    Expressing the First Milk - Small Baby Series (7:19)  Hand Expression [facility] (7:34)  Attaching Your Baby at the Breast - Breastfeeding Series (10:26)  The Power of Pumping - [facility]   Maximizing Production [facility] (9:35)  Instructions for use Medela Symphony breastpump (English) (1:58)  Medela 2-Phase Expression (4:05)  Medela double pumping video (2:19)  Choosing your PersonalFit breast shield size (3:04)  We also recommend visiting [email or web address] for valuable education and videos on breastfeeding full term AND  infants. This is a great resource to begin learning about breastfeeding during pregnancy as well.                [facility] Lactation Services             [phone]

## 2024-03-27 NOTE — PAYOR COMM NOTE
"REF:   [id number]    [facility]  RASHEL,CM    [phone]  OR  FAX   [phone]    Andria Ureña (38 y.o. Female)       Date of Birth   1985    Social Security Number       Address   [address] [address]    Home Phone   [phone]    MRN   [id number]       Roman Catholic   Faith    Marital Status                               Admission Date   3/27/24    Admission Type   Elective    Admitting Provider   Vargas Dejesus MD    Attending Provider   Neris Nelson MD    Department, Room/Bed   [facility] MOTHER BABY 2A, M240/1       Discharge Date       Discharge Disposition       Discharge Destination                                 Attending Provider: Neris Nelson MD    Allergies: No Known Allergies    Isolation: None   Infection: COVID (History) (21)   Code Status: CPR    Ht: 154.9 cm (61\")   Wt: 81.7 kg (180 lb 3.2 oz)    Admission Cmt: None   Principal Problem: Encounter for maternal care for low transverse scar from repeat  delivery [O34.211]                   Active Insurance as of 3/27/2024       Primary Coverage       Payor Plan Insurance Group Employer/Plan Group    ANTHEM MEDICAID ANTHEM MEDICAID [id number]       Payor Plan Address Payor Plan Phone Number Payor Plan Fax Number Effective Dates    [address] [address] [phone]  3/1/2020 - None Entered    [facility] [address]         Subscriber Name Subscriber Birth Date Member ID       ANDRIA UREÑA 1985 [id number]                     Emergency Contacts        (Rel.) Home Phone Work Phone Mobile Phone    James Ureña (Spouse) [phone]        EDC 3/31/2024    G- 7 P- 4    39/3 GESTATIONAL AGE       3/27/2024 AT 8:06 AM       FEMALE   37[address] GRAMS IN WEIGHT  APGAR 4/9      Mackenzie Ureña [[id number]]    Labor Events     labor?: No   steroids?: None  Antibiotics during " "labor?: No  Rupture date/time: 3/27/2024 0806  Rupture type: artificial rupture of membranes  Fluid color: normal, clear  Fluid odor: None  Labor type:  Without Labor  Labor allowed to proceed with plans for an attempted vaginal birth?: No  Complications: None  Presentation    Presentation: Vertex  Position: Occiput   Delivery    Head delivery date/time: 3/27/2024 08:06:55  Delivery date/time: 3/27/2024  8:06 AM   Delivery type: , Low Transverse   Details: Trial of labor?: No    categorization: repeat    priority: routine   Skin Incision Type: Pfannensteil        Operative Delivery    Forceps attempted?: No  Vacuum extractor attempted?: No                  Henrietta Assessment    Living status: Living  Infant family band number: [id number]  Apgars   1 Minute: 5 Minute: 10 Minute 15 Minute 20 Minute   Skin Color: 0 1      Heart Rate: 1 2      Reflex Irritability: 2 2      Muscle Tone: 1 2      Respiratory Effort: 0 2      Total: 4 9              Apgars Assigned By: MARTHA RUIZ  Resuscitation    Method: Suctioning, Oxygen, PPV, Tactile Stimulation, CPAP, Warmed via Radiant Warmer , Dried  Suction Details  Suction method: bulb syringe, gastric  Airway suction: mouth, nose  Suction pressure (mmHg): 90  Oxygen Details  O2 concentration (%): 50  PPV duration (seconds): 30  CPAP pressure: 5  Skin to Skin    Reason skin to skin not initiated: Henrietta Acuity  Measurements    Weight: 8 lb 5 oz 3770 g Length: 20\"     Delivery Information    Episiotomy: None  Perineal lacerations: None    Surgical or additional est. blood loss (mL): 931  Combined est. blood loss (mL): 931     History & Physical        Neris Nelson MD at 24          H&P reviewed. The patient was examined and there are no changes to the H&P.    Electronically signed by Neris Nelson MD at 24   Source Note: H&P (View-Only)          JENNIFER MARROQUIN" Adelita  : 1985  MRN: [id number]  CSN: [id number]    History and Physical    Subjective  Andria Ureña is a 38 y.o. year old  with an Estimated Date of Delivery: 3/31/24 scheduled on 3/27 for  delivery due to previous c/s x 4 for repeat.with bilateral tubal ligation.  She is planning for sterilization at the time of the . Pt with complaints of decreased FM for which she was sent to L&D for NST.     Prenatal care has been with Dr. Vargas Dejesus and Jessica Moreno.  It has been benign.    OB History    Para Term  AB Living   7 4 4 0 2 4   SAB IAB Ectopic Molar Multiple Live Births   2 0 0 0 0 4      # Outcome Date GA Lbr Maxwell/2nd Weight Sex Type Anes PTL Lv   7 Current            6 Term 21 39w4d  3997 g (8 lb 13 oz) M CS-LTranv Spinal N EMILIANO      Birth Comments: hc 35 cm      Name: MARIA C UREÑA      Apgar1: 8  Apgar5: 9   5 SAB 19 16w0d       FD   4 SAB 18 16w0d       FD   3 Term 17 40w0d  3118 g (6 lb 14 oz) M CS-LTranv Spinal N EMILIANO   2 Term 12 40w0d  4054 g (8 lb 15 oz) M CS-LTranv Spinal N EMILIANO   1 Term 12/08/10 40w0d  3204 g (7 lb 1 oz) F CS-LTranv Spinal N EMILIANO      Complications: Failure to Progress in First Stage     Past Medical History:   Diagnosis Date    ADHD     Anxiety     Depression     Migraine     Recurrent pregnancy loss, antepartum condition or complication      Past Surgical History:   Procedure Laterality Date     SECTION       SECTION N/A 2021    Procedure: REPEAT  SECTION WITHOUT TUBAL LIGATION;  Surgeon: Pirya Moreno MD;  Location: [facility] LABOR DELIVERY;  Service: Obstetrics/Gynecology;  Laterality: N/A;    D & C WITH SUCTION      EXPLORATORY LAPAROTOMY         Current Outpatient Medications:     cyclobenzaprine (FLEXERIL) 10 MG tablet, Take 1 tablet by mouth 3 (Three) Times a Day As Needed for Muscle Spasms., Disp: 30 tablet, Rfl: 1    FeroSul 325 (65 Fe) MG tablet, , Disp: ,  Rfl:     Prenatal Vit-Fe Fumarate-FA (Prenatal ) 27-1 MG tablet tablet, , Disp: , Rfl:   Family History   Problem Relation Age of Onset    Hyperlipidemia Mother     Cancer Father     Stroke Maternal Grandfather     Stroke Paternal Grandfather     Hyperlipidemia Sister     Hyperlipidemia Sister     Miscarriages / Stillbirths Sister         Katelyn also had 2 miscarriages    Vision loss Sister     Hyperlipidemia Brother     Hyperlipidemia Brother        No Known Allergies  Social History    Tobacco Use      Smoking status: Never        Passive exposure: Never      Smokeless tobacco: Never    Review of Systems      Objective  /82   Wt 80.2 kg (176 lb 12.8 oz)   LMP 2023 (Approximate)   BMI 33.41 kg/m²   General: well developed; well nourished  no acute distress   Heart: regular rate and rhythm, S1, S2 normal, no murmur, click, rub or gallop   Lungs: breathing is unlabored   Abdomen: soft, non-tender; no masses  no umbilical or inguinal hernias are present  no hepato-splenomegaly     Cervix:   not checked  Prenatal Labs  Lab Results   Component Value Date    HGB 10.8 (L) 2024    HEPBSAG Negative 2020    ABO O 2021    RH Positive 2021    ABSCRN Negative 2021    SAK3GBZ2 Non-Reactive 2020    URINECX <25,000 CFU/mL Normal Urogenital Christina 2024       Recent Labs  Lab Results   Component Value Date    HGB 10.8 (L) 2024    HCT 32.7 (L) 2024    WBC 12.01 (H) 2024     2024           Assessment  IUP with an Estimated Date of Delivery: 3/31/24  Planned  section on 3/27/2024 for  previous c/s x 4 for repeat with tubal ligation .     Plan  Repeat  with bilateral salpingectomy    Neris Nelson MD  3/26/2024           Electronically signed by Neris Nelson MD at 24 4315                 Neris Nelson MD at 24 5094           Dylan MARROQUIN  Adelita  : 1985  MRN: [id number]  CSN: [id number]    History and Physical    Subjective  Andria Ureña is a 38 y.o. year old  with an Estimated Date of Delivery: 3/31/24 scheduled on 3/27 for  delivery due to previous c/s x 4 for repeat.with bilateral tubal ligation.  She is planning for sterilization at the time of the . Pt with complaints of decreased FM for which she was sent to L&D for NST.     Prenatal care has been with Dr. Vargas Dejesus and Jessica Moreno.  It has been benign.    OB History    Para Term  AB Living   7 4 4 0 2 4   SAB IAB Ectopic Molar Multiple Live Births   2 0 0 0 0 4      # Outcome Date GA Lbr Maxwell/2nd Weight Sex Type Anes PTL Lv   7 Current            6 Term 21 39w4d  3997 g (8 lb 13 oz) M CS-LTranv Spinal N EMILIANO      Birth Comments: hc 35 cm      Name: MARIA C UREÑA      Apgar1: 8  Apgar5: 9   5 SAB 19 16w0d       FD   4 SAB 18 16w0d       FD   3 Term 17 40w0d  3118 g (6 lb 14 oz) M CS-LTranv Spinal N EMILIANO   2 Term 12 40w0d  4054 g (8 lb 15 oz) M CS-LTranv Spinal N EMILIANO   1 Term 12/08/10 40w0d  3204 g (7 lb 1 oz) F CS-LTranv Spinal N EMILIANO      Complications: Failure to Progress in First Stage     Past Medical History:   Diagnosis Date    ADHD     Anxiety     Depression     Migraine     Recurrent pregnancy loss, antepartum condition or complication      Past Surgical History:   Procedure Laterality Date     SECTION       SECTION N/A 2021    Procedure: REPEAT  SECTION WITHOUT TUBAL LIGATION;  Surgeon: Priya Moreno MD;  Location: [facility] LABOR DELIVERY;  Service: Obstetrics/Gynecology;  Laterality: N/A;    D & C WITH SUCTION      EXPLORATORY LAPAROTOMY         Current Outpatient Medications:     cyclobenzaprine (FLEXERIL) 10 MG tablet, Take 1 tablet by mouth 3 (Three) Times a Day As Needed for Muscle Spasms., Disp: 30 tablet, Rfl: 1    FeroSul 325 (65 Fe) MG tablet, , Disp: ,  Rfl:     Prenatal Vit-Fe Fumarate-FA (Prenatal ) 27-1 MG tablet tablet, , Disp: , Rfl:   Family History   Problem Relation Age of Onset    Hyperlipidemia Mother     Cancer Father     Stroke Maternal Grandfather     Stroke Paternal Grandfather     Hyperlipidemia Sister     Hyperlipidemia Sister     Miscarriages / Stillbirths Sister         Katelyn also had 2 miscarriages    Vision loss Sister     Hyperlipidemia Brother     Hyperlipidemia Brother        No Known Allergies  Social History    Tobacco Use      Smoking status: Never        Passive exposure: Never      Smokeless tobacco: Never    Review of Systems      Objective  /82   Wt 80.2 kg (176 lb 12.8 oz)   LMP 2023 (Approximate)   BMI 33.41 kg/m²   General: well developed; well nourished  no acute distress   Heart: regular rate and rhythm, S1, S2 normal, no murmur, click, rub or gallop   Lungs: breathing is unlabored   Abdomen: soft, non-tender; no masses  no umbilical or inguinal hernias are present  no hepato-splenomegaly     Cervix:   not checked  Prenatal Labs  Lab Results   Component Value Date    HGB 10.8 (L) 2024    HEPBSAG Negative 2020    ABO O 2021    RH Positive 2021    ABSCRN Negative 2021    RCU2JCY0 Non-Reactive 2020    URINECX <25,000 CFU/mL Normal Urogenital Christina 2024       Recent Labs  Lab Results   Component Value Date    HGB 10.8 (L) 2024    HCT 32.7 (L) 2024    WBC 12.01 (H) 2024     2024           Assessment  IUP with an Estimated Date of Delivery: 3/31/24  Planned  section on 3/27/2024 for  previous c/s x 4 for repeat with tubal ligation .     Plan  Repeat  with bilateral salpingectomy    Neris Nelson MD  3/26/2024           Electronically signed by Neris Nelson MD at 24 1424          Operative/Procedure Notes (last 48 hours)        Neris Nelson MD at 24 0802           SECTION REPEAT WITH TUBAL   "Procedure Report    Patient Name:  Andria Ureña  YOB: 1985    Date of Surgery:  3/27/2024     Pre-op Diagnosis:   IUP at 39 weeks gestation  Previous c/s x 4  Undesired fertility    Post-op Diagnosis:  Same, delivered    Procedure:  Procedure(s):  REPEAT  SECTION WITH TUBAL LIGATION     Staff:  Surgeon(s):  Neris Nelson MD    Assistant:       Anesthesia: Spinal    Antibiotics: cefazolin 2 gms    Drains: Hendrix catheter    Estimated Blood Loss:  900 cc ,      Quantitative Blood Loss:  ,  931 cc    Urinary Output: 300 mL     Fluids: 1000 mL    Implants:    Nothing was implanted during the procedure    Specimen:          Specimens       ID Source Type Tests Collected By Collected At Frozen?    C Fallopian Tube, Left Tissue TISSUE PATHOLOGY EXAM   Neris Nelson MD 3/27/24 0830     D Fallopian Tube, Right Tissue TISSUE PATHOLOGY EXAM   Neris Nelson MD 3/27/24 0830               Delivery     Delivery: , Low Transverse     YOB: 2022    Time of Birth:  Gestational Age 8:06 AM   39w3d     Anesthesia: Spinal     Delivering clinician: Neris Nelson      Infant    Findings: female  infant     Infant observations: Weight: 3770 g (8 lb 5 oz)   Length: 20  in  Observations/Comments:        Apgars: 4  @ 1 minute /    9  @ 5 minutes         Placenta, Cord, and Fluid    Placenta delivered  Spontaneous  at   3/27/2024  8:12 AM     Cord: 3 vessels  present.   Nuchal Cord?  no   Cord blood obtained: Yes    Cord gases obtained:  No    Cord gas results: Venous:  No results found for: \"PHCVEN\", \"BECVEN\"    Arterial:  No results found for: \"PHCART\", \"BECART\"     Operative Findings:  Normal appearing uterus, ovaries, and fallopian tubes bilaterally.    Complications  none    Indications:    Andria Ureña is a 38 y.o. female who presents at 39+ weeks for a scheduled repeat .    Description of Procedure:   After informed consent was obtained, the " patient was taken to the operating room where  Spinal anesthesia was administered. A time out procedure was completed, confirming the correct patient and correct procedure. Perioperative antibiotics were administered. She was placed in the supine position with leftward tilt and her abdomen was prepped and draped in normal sterile fashion after a Hendrix catheter was placed by nursing staff.     After confirming adequate anesthesia, a Pfannenstiel incision was made in the skin with the surgical scalpel. Sharp dissection was carried out over the subsequent layers of tissue down to the fascia at midline. The fascial incision was extended laterally in both directions with fascial stretch. The rectus muscles were divided at midline and the peritoneum was then opened with blunt dissection at its upper margin. The peritoneum was then stretched by pulling caudally and cranially. All layers of the abdominal wall were manually stretched laterally to provide an opening as large as the skin incision.  A bladder blade was placed.     A bladder flap was created.  A low-transverse incision was made in the lower uterine segment using the scalpel. The uterine incision was extended bilaterally using blunt dissection in a cranial-caudad stretch. The amniotic sac was entered and the amniotic fluid was noted to be clear.     The surgeon’s hand was placed into the uterine cavity. The fetal head was identified, elevated into the abdomen and delivered through the uterine incision with the assistance of fundal pressure. The infant was examined for nuchal cord. No nuchal cord was identified .    The infant was delivered with traction and the assistance of fundal pressure. The infant’s oral and nasal passages were bulb suctioned. The infant was vigorous on the field. Delayed cord clamping for 30 seconds was observed. Following delayed cord clamping, the cord was clamped and cut x2. The infant was then passed off the table to the awaiting infant  care nurse for further care. Routine blood testing was obtained and the placenta was expressed.    Oxytocin was administered by IV infusion to enhance uterine contraction. The uterus was cleared of all clots and remaining products of conception, and then repaired in situ. The uterine incision was closed with #0-caprosyn in a running, locked fashion. A second imbricating stitch with a #0-caprosyn was used to obtain excellent hemostasis. Non-hemostatic areas were reinforced using #0-Vicryl in figure-of-eight stitches. Good hemostasis was confirmed.       The RIGHT fallopian tube was then grasped with a Carrie clamp  2-0 chromic ties were used for a apolonia tubal ligation. Resulting pedicles were cauterized with the bovie.  Hemostasis was achieved without any further measures.  The LEFT fallopian tube was then addressed in a similar fashion.  Both ovaries were noted to be normal.    The pericolic gutters were cleared of all clots.  The fascia was reapproximated using #0-Vicryl in a running non-locked fashion. The subcutaneous layer was reapproximated with #2-O Plain in a running, non-locked fashion. The skin was reapproximated using #4-O Monocryl.     All sponge, needle, and instrument counts were noted to be correct ×3 at the end of the procedure.     Disposition  Mother to Mother Baby/Postpartum  in stable condition currently.  Baby to NICU  in stable condition currently.    Neris Nelson MD     Date: 3/27/2024  Time: 08:48 CDT       Electronically signed by Neris Nelson MD at 03/27/24 5717

## 2024-03-27 NOTE — PLAN OF CARE
Goal Outcome Evaluation:         VSS. Patient reports N/V after delivery, prn antiemetics given. Continued nausea and dizziness reported but patient refused further medication. FFMLUU rubra scant bleeding. F/c in place. Dressing over incision CDI. Abdominal binder in use. Not passing flatus. Breastfeeding. Spouse at bedside. Bonding well with infant

## 2024-03-27 NOTE — PLAN OF CARE
Goal Outcome Evaluation:  Plan of Care Reviewed With: patient        Progress: improving  Outcome Evaluation: Pt delivered viable female per reapeat c/s. FF, UU, scant. Pt denies c/o at this time

## 2024-03-27 NOTE — ANESTHESIA PROCEDURE NOTES
Spinal Block      Patient location during procedure: OR  Start Time: 3/27/2024 7:44 AM  Stop Time: 3/27/2024 7:53 AM  Indication:at surgeon's request  Performed By  CRNA/CAA: Harvey Das CRNA  Preanesthetic Checklist  Completed: patient identified, IV checked, site marked, risks and benefits discussed, surgical consent, monitors and equipment checked, pre-op evaluation and timeout performed  Spinal Block Prep:  Patient Position:sitting  Sterile Tech:cap, gloves, mask and sterile barriers  Prep:DuraPrep  Patient Monitoring:blood pressure monitoring and continuous pulse oximetry    Spinal Block Procedure  Approach:midline  Guidance:landmark technique and palpation technique  Location:L3-L4  Needle Type:Sprotte  Needle Gauge:24 G  Placement of Spinal needle event:cerebrospinal fluid aspirated  Paresthesia: no  Fluid Appearance:clear  Medications: bupivacaine PF (MARCAINE) 0.75% - Intrathecal   1.4 mL - 3/27/2024 7:53:00 AM   Post Assessment  Patient Tolerance:patient tolerated the procedure well with no apparent complications  Complications no

## 2024-03-27 NOTE — ANESTHESIA PREPROCEDURE EVALUATION
Anesthesia Evaluation     Patient summary reviewed   no history of anesthetic complications:   NPO Solid Status: > 8 hours  NPO Liquid Status: > 8 hours           Airway   Mallampati: I  TM distance: >3 FB  No difficulty expected  Dental - normal exam     Pulmonary - negative pulmonary ROS and normal exam   Cardiovascular - negative cardio ROS and normal exam  Exercise tolerance: good (4-7 METS)        Neuro/Psych  (+) headaches, psychiatric history Anxiety, Depression and ADHD  GI/Hepatic/Renal/Endo    (+) obesity  (-) liver disease    Musculoskeletal (-) negative ROS    Abdominal  - normal exam   Substance History - negative use     OB/GYN    (+) Pregnant        Other - negative ROS                         Anesthesia Plan    ASA 2     spinal       Anesthetic plan, risks, benefits, and alternatives have been provided, discussed and informed consent has been obtained with: patient.

## 2024-03-28 LAB
BASOPHILS # BLD AUTO: 0.02 10*3/MM3 (ref 0–0.2)
BASOPHILS NFR BLD AUTO: 0.2 % (ref 0–1.5)
DEPRECATED RDW RBC AUTO: 48.9 FL (ref 37–54)
EOSINOPHIL # BLD AUTO: 0.17 10*3/MM3 (ref 0–0.4)
EOSINOPHIL NFR BLD AUTO: 1.7 % (ref 0.3–6.2)
ERYTHROCYTE [DISTWIDTH] IN BLOOD BY AUTOMATED COUNT: 14.6 % (ref 12.3–15.4)
FERRITIN SERPL-MCNC: 9.32 NG/ML (ref 13–150)
HCT VFR BLD AUTO: 20.3 % (ref 34–46.6)
HGB BLD-MCNC: 6.4 G/DL (ref 12–15.9)
IMM GRANULOCYTES # BLD AUTO: 0.07 10*3/MM3 (ref 0–0.05)
IMM GRANULOCYTES NFR BLD AUTO: 0.7 % (ref 0–0.5)
IRON 24H UR-MRATE: 27 MCG/DL (ref 37–145)
IRON SATN MFR SERPL: 5 % (ref 20–50)
LYMPHOCYTES # BLD AUTO: 1.95 10*3/MM3 (ref 0.7–3.1)
LYMPHOCYTES NFR BLD AUTO: 19.2 % (ref 19.6–45.3)
MCH RBC QN AUTO: 28.8 PG (ref 26.6–33)
MCHC RBC AUTO-ENTMCNC: 31.5 G/DL (ref 31.5–35.7)
MCV RBC AUTO: 91.4 FL (ref 79–97)
MONOCYTES # BLD AUTO: 0.9 10*3/MM3 (ref 0.1–0.9)
MONOCYTES NFR BLD AUTO: 8.9 % (ref 5–12)
NEUTROPHILS NFR BLD AUTO: 69.3 % (ref 42.7–76)
NEUTROPHILS NFR BLD AUTO: 7.03 10*3/MM3 (ref 1.7–7)
NRBC BLD AUTO-RTO: 0 /100 WBC (ref 0–0.2)
PLATELET # BLD AUTO: 201 10*3/MM3 (ref 140–450)
PMV BLD AUTO: 9.8 FL (ref 6–12)
RBC # BLD AUTO: 2.22 10*6/MM3 (ref 3.77–5.28)
TIBC SERPL-MCNC: 499 MCG/DL (ref 298–536)
TRANSFERRIN SERPL-MCNC: 335 MG/DL (ref 200–360)
WBC NRBC COR # BLD AUTO: 10.14 10*3/MM3 (ref 3.4–10.8)

## 2024-03-28 PROCEDURE — 25010000002 IRON SUCROSE PER 1 MG: Performed by: ADVANCED PRACTICE MIDWIFE

## 2024-03-28 PROCEDURE — 85025 COMPLETE CBC W/AUTO DIFF WBC: CPT | Performed by: OBSTETRICS & GYNECOLOGY

## 2024-03-28 PROCEDURE — 83540 ASSAY OF IRON: CPT | Performed by: ADVANCED PRACTICE MIDWIFE

## 2024-03-28 PROCEDURE — 84466 ASSAY OF TRANSFERRIN: CPT | Performed by: ADVANCED PRACTICE MIDWIFE

## 2024-03-28 PROCEDURE — 25810000003 SODIUM CHLORIDE 0.9 % SOLUTION 250 ML FLEX CONT: Performed by: ADVANCED PRACTICE MIDWIFE

## 2024-03-28 PROCEDURE — 25010000002 KETOROLAC TROMETHAMINE PER 15 MG: Performed by: OBSTETRICS & GYNECOLOGY

## 2024-03-28 PROCEDURE — 25010000002 FUROSEMIDE PER 20 MG: Performed by: OBSTETRICS & GYNECOLOGY

## 2024-03-28 PROCEDURE — 25810000003 LACTATED RINGERS PER 1000 ML: Performed by: OBSTETRICS & GYNECOLOGY

## 2024-03-28 PROCEDURE — 82728 ASSAY OF FERRITIN: CPT | Performed by: ADVANCED PRACTICE MIDWIFE

## 2024-03-28 PROCEDURE — 99231 SBSQ HOSP IP/OBS SF/LOW 25: CPT | Performed by: OBSTETRICS & GYNECOLOGY

## 2024-03-28 RX ORDER — FUROSEMIDE 10 MG/ML
20 INJECTION INTRAMUSCULAR; INTRAVENOUS ONCE
Status: COMPLETED | OUTPATIENT
Start: 2024-03-28 | End: 2024-03-28

## 2024-03-28 RX ADMIN — KETOROLAC TROMETHAMINE 15 MG: 15 INJECTION, SOLUTION INTRAMUSCULAR; INTRAVENOUS at 08:55

## 2024-03-28 RX ADMIN — FUROSEMIDE 20 MG: 10 INJECTION, SOLUTION INTRAMUSCULAR; INTRAVENOUS at 12:40

## 2024-03-28 RX ADMIN — KETOROLAC TROMETHAMINE 15 MG: 15 INJECTION, SOLUTION INTRAMUSCULAR; INTRAVENOUS at 03:31

## 2024-03-28 RX ADMIN — ACETAMINOPHEN 1000 MG: 500 TABLET, FILM COATED ORAL at 00:04

## 2024-03-28 RX ADMIN — ACETAMINOPHEN 650 MG: 325 TABLET ORAL at 12:05

## 2024-03-28 RX ADMIN — SODIUM CHLORIDE, POTASSIUM CHLORIDE, SODIUM LACTATE AND CALCIUM CHLORIDE 125 ML/HR: 600; 310; 30; 20 INJECTION, SOLUTION INTRAVENOUS at 02:46

## 2024-03-28 RX ADMIN — ACETAMINOPHEN 1000 MG: 500 TABLET, FILM COATED ORAL at 06:08

## 2024-03-28 RX ADMIN — PRENATAL VIT W/ FE FUMARATE-FA TAB 27-0.8 MG 1 TABLET: 27-0.8 TAB at 08:55

## 2024-03-28 RX ADMIN — ACETAMINOPHEN 650 MG: 325 TABLET ORAL at 18:33

## 2024-03-28 RX ADMIN — IRON SUCROSE 300 MG: 20 INJECTION, SOLUTION INTRAVENOUS at 10:07

## 2024-03-28 RX ADMIN — IBUPROFEN 600 MG: 600 TABLET, FILM COATED ORAL at 20:38

## 2024-03-28 RX ADMIN — IBUPROFEN 600 MG: 600 TABLET, FILM COATED ORAL at 15:38

## 2024-03-28 NOTE — LACTATION NOTE
"Mother's Name: Andria Phone #: [phone]  Infant Name: Shani  : 3/27/24  Gestation: 39w3d  Day of life:  Birth weight: 8-5 (3770g) Discharge weight:  Weight Loss:   24 hour Summary of Feeds:  Voids:  Stools:  Assistive devices (shields, shells, etc):  Significant Maternal history: , C/S, AMA, ADHD, Migraines, Miscarriages, D&C, Anxiety, Depression  Maternal Concerns: None at this time  Maternal Goal: Breastfeed-Attempted to breastfeed all children,  last child for 18 months w/o any difficulties   Mother's Medications: PNV, FE  Breastpump for home: Old plug in pump. Wants hands free pump. Rx faxed to [facility].  Ped follow up appt:     F/U lactation visit.  Mom holding infant skin to skin.  She states she is so excited to be holding infant and \"seeing\" her now because she was so sick earlier that she couldn't really enjoy seeing her.  Mom reports latching going so, so, some times she gets it and sometimes it takes a little while to latch, but they are working at it.  Asking for suggestions on latching on right breast because it has been more of a problem with each child.  It is difficult to latch, babies preferred the left, and she produced less on that side.  Suggested football position on right breast, and to start the feedings more on that side to encourage increased milk production.  Offered to help latch if needed tonight.    0300  Called to room per patient to assist with latch.  Infant brought to room and assisted mom with latching infant in football position on right side.  Minimal positioning assist needed.  Infant latched deeply and began sucking with deep jaw drops.  Swallows noted.  Educated mom on providing stimulation to keep infant awake and actively sucking.  Mom shown active sucking movements along jaw line.  Gave hand pump and lanolin, and encouraged hand expressing and/or pumping for extra stimulation and milk removal. Offered assistance with latching on left breast if " needed.

## 2024-03-28 NOTE — PLAN OF CARE
Goal Outcome Evaluation:  Plan of Care Reviewed With: patient        Progress: improving     VSS, FF/M/U1 with scant bleeding currently but had moderate bleeding at first check, bolus given d/t low urine output, montgomery out, DTV, tolerating PO, breastfeeding, ambulating, resting between care.

## 2024-03-28 NOTE — PLAN OF CARE
Goal Outcome Evaluation:         VSS. Voiding. Ambulating. FFMLU1 scant bleeding. Dressing CDI. Hemoglobin dropped to 6.4, Lucia Robert notified. IV iron administered, patient tolerated well. Breast feeding. Bonding well with infant

## 2024-03-28 NOTE — OP NOTE
Neris Nelson MD  [facility]  [address]  [place] [address]  Office [phone]  Fax [phone]      [facility]   PROGRESS NOTE    Post-Op Day 1 S/P   Subjective     Patient reports:  Pain is well controlled with acetaminophen, ibuprofen (OTC), and prescription NSAID's including percocet .  She is ambulating. Tolerating diet. Voiding - without difficulty; No BM or flatus reported..  Vaginal bleeding with several large clots last night, now light.  Pt with Hg 6.4 this am. Asymptomatic. Does not desire blood.      Objective      Vitals: Vital Signs Range for the last 24 hours  Temperature: Temp:  [97 °F (36.1 °C)-98.9 °F (37.2 °C)] 97.9 °F (36.6 °C)   Temp Source: Temp src: Temporal   BP: BP: (109-138)/(56-77) 109/56   Pulse: Heart Rate:  [] 98   Respirations: Resp:  [16-18] 18   SPO2: SpO2:  [97 %-99 %] 97 %   O2 Amount (l/min):     O2 Devices Device (Oxygen Therapy): room air   Weight:              Physical Exam    Lungs Non-labored, symmetrical chest rise   Abdomen Soft, no TTP, no distension   Incision  Bandage c/d/I    Extremities extremities normal, atraumatic, no cyanosis or edema     I reviewed the patient's new clinical results.  Lab Results (last 24 hours)       Procedure Component Value Units Date/Time    Iron Profile [[id number]]  (Abnormal) Collected: 24    Specimen: Blood Updated: 24     Iron 27 mcg/dL      Iron Saturation (TSAT) 5 %      Transferrin 335 mg/dL      TIBC 499 mcg/dL     Ferritin [[id number]]  (Abnormal) Collected: 24    Specimen: Blood Updated: 24     Ferritin 9.32 ng/mL     Narrative:      Results may be falsely decreased if patient taking Biotin.      Tissue Pathology Exam [[id number]] Collected: 24    Specimen: Tissue from Fallopian Tube, Left; Tissue from Fallopian Tube, Right Updated: 24    CBC & Differential [[id number]]  (Abnormal) Collected: 24    Specimen:  Blood Updated: 03/28/24 0822    Narrative:      The following orders were created for panel order CBC & Differential.  Procedure                               Abnormality         Status                     ---------                               -----------         ------                     CBC Auto Differential[[id number]]        Abnormal            Final result                 Please view results for these tests on the individual orders.    CBC Auto Differential [[id number]]  (Abnormal) Collected: 03/28/24 0748    Specimen: Blood Updated: 03/28/24 0822     WBC 10.14 10*3/mm3      RBC 2.22 10*6/mm3      Hemoglobin 6.4 g/dL      Hematocrit 20.3 %      MCV 91.4 fL      MCH 28.8 pg      MCHC 31.5 g/dL      RDW 14.6 %      RDW-SD 48.9 fl      MPV 9.8 fL      Platelets 201 10*3/mm3      Neutrophil % 69.3 %      Lymphocyte % 19.2 %      Monocyte % 8.9 %      Eosinophil % 1.7 %      Basophil % 0.2 %      Immature Grans % 0.7 %      Neutrophils, Absolute 7.03 10*3/mm3      Lymphocytes, Absolute 1.95 10*3/mm3      Monocytes, Absolute 0.90 10*3/mm3      Eosinophils, Absolute 0.17 10*3/mm3      Basophils, Absolute 0.02 10*3/mm3      Immature Grans, Absolute 0.07 10*3/mm3      nRBC 0.0 /100 WBC     T Pallidum Antibody w/ reflex RPR (Syphilis) [[id number]]  (Normal) Collected: 03/27/24 0600    Specimen: Blood Updated: 03/27/24 1219     Treponemal AB Total Non-Reactive    POC Glucose Once [[id number]]  (Normal) Collected: 03/27/24 1022    Specimen: Blood Updated: 03/27/24 1033     Glucose 127 mg/dL      Comment: : [id number] Nathaniel Barron RMeter ID: [id number]               Prenatal Labs  Lab Results   Component Value Date    HGB 6.4 (C) 03/28/2024    RUBELLAABIGG Immune 11/05/2020    HEPBSAG Negative 11/05/2020    ABSCRN Negative 03/27/2024    LAS9LFV2 Non-Reactive 11/05/2020    STREPGPB Negative 03/04/2024    URINECX <25,000 CFU/mL Normal Urogenital Christina 01/25/2024    CHLAMNAA Negative 03/04/2024    NGONORRHON Negative  03/04/2024       Immunizations:   There is no immunization history for the selected administration types on file for this patient.  Lab Results (last 24 hours)       Procedure Component Value Units Date/Time    Iron Profile [[id number]]  (Abnormal) Collected: 03/28/24 0748    Specimen: Blood Updated: 03/28/24 0926     Iron 27 mcg/dL      Iron Saturation (TSAT) 5 %      Transferrin 335 mg/dL      TIBC 499 mcg/dL     Ferritin [[id number]]  (Abnormal) Collected: 03/28/24 0748    Specimen: Blood Updated: 03/28/24 0926     Ferritin 9.32 ng/mL     Narrative:      Results may be falsely decreased if patient taking Biotin.      Tissue Pathology Exam [[id number]] Collected: 03/27/24 0830    Specimen: Tissue from Fallopian Tube, Left; Tissue from Fallopian Tube, Right Updated: 03/28/24 0915    CBC & Differential [[id number]]  (Abnormal) Collected: 03/28/24 0748    Specimen: Blood Updated: 03/28/24 0822    Narrative:      The following orders were created for panel order CBC & Differential.  Procedure                               Abnormality         Status                     ---------                               -----------         ------                     CBC Auto Differential[[id number]]        Abnormal            Final result                 Please view results for these tests on the individual orders.    CBC Auto Differential [[id number]]  (Abnormal) Collected: 03/28/24 0748    Specimen: Blood Updated: 03/28/24 0822     WBC 10.14 10*3/mm3      RBC 2.22 10*6/mm3      Hemoglobin 6.4 g/dL      Hematocrit 20.3 %      MCV 91.4 fL      MCH 28.8 pg      MCHC 31.5 g/dL      RDW 14.6 %      RDW-SD 48.9 fl      MPV 9.8 fL      Platelets 201 10*3/mm3      Neutrophil % 69.3 %      Lymphocyte % 19.2 %      Monocyte % 8.9 %      Eosinophil % 1.7 %      Basophil % 0.2 %      Immature Grans % 0.7 %      Neutrophils, Absolute 7.03 10*3/mm3      Lymphocytes, Absolute 1.95 10*3/mm3      Monocytes, Absolute 0.90 10*3/mm3      Eosinophils,  Absolute 0.17 10*3/mm3      Basophils, Absolute 0.02 10*3/mm3      Immature Grans, Absolute 0.07 10*3/mm3      nRBC 0.0 /100 WBC     T Pallidum Antibody w/ reflex RPR (Syphilis) [[id number]]  (Normal) Collected: 24 0600    Specimen: Blood Updated: 24 1219     Treponemal AB Total Non-Reactive    POC Glucose Once [[id number]]  (Normal) Collected: 24 1022    Specimen: Blood Updated: 24 1033     Glucose 127 mg/dL      Comment: : [id number] Nathaniel Velazquez ID: [id number]               CBC          2024    10:42 3/27/2024    06:00 3/28/2024    07:48   CBC   WBC 12.01  10.59  10.14    RBC 3.47  3.52  2.22    Hemoglobin 10.8  10.2  6.4    Hematocrit 32.7  31.4  20.3    MCV 94.2  89.2  91.4    MCH 31.1  29.0  28.8    MCHC 33.0  32.5  31.5    RDW 13.1  14.6  14.6    Platelets 241  232  201          Assessment & Plan        Encounter for maternal care for low transverse scar from repeat  delivery        Assessment:    Andria Ureña is Day 1  post-partum  , Low Transverse   .       Plan:   IV venofer today. CBC in am. Regular diet, ambulate .        Neris Nelson MD  3/28/2024  09:53 CDT

## 2024-03-28 NOTE — ANESTHESIA POSTPROCEDURE EVALUATION
Patient: Andria Ureña    Procedure Summary       Date: 24 Room / Location: [facility] LABOR DELIVERY 1 /  [facility] LABOR DELIVERY    Anesthesia Start: 743 Anesthesia Stop: 848    Procedure: REPEAT  SECTION WITH TUBAL LIGATION (Bilateral: Abdomen) Diagnosis: (PREVIOUS CESARWAN DELIVERY, STERILIZATION)    Surgeons: Neris Nelson MD Provider: Harvey Das CRNA    Anesthesia Type: spinal ASA Status: 2            Anesthesia Type: spinal    Vitals  Vitals Value Taken Time   /59 24 1017   Temp 97.9 °F (36.6 °C) 24 0719   Pulse 98 24 0719   Resp 18 24 0719   SpO2 97 % 24 0719           Post Anesthesia Care and Evaluation    Patient location during evaluation: floor  Patient participation: complete - patient participated  Level of consciousness: awake and alert  Pain score: 0  Pain management: adequate    Airway patency: patent  Anesthetic complications: No anesthetic complications    Cardiovascular status: acceptable and stable  Respiratory status: room air  Hydration status: acceptable  Post Neuraxial Block status: Motor and sensory function returned to baseline and No signs or symptoms of PDPH

## 2024-03-29 VITALS
RESPIRATION RATE: 18 BRPM | WEIGHT: 180.2 LBS | HEART RATE: 107 BPM | HEIGHT: 61 IN | DIASTOLIC BLOOD PRESSURE: 72 MMHG | BODY MASS INDEX: 34.02 KG/M2 | SYSTOLIC BLOOD PRESSURE: 117 MMHG | OXYGEN SATURATION: 98 % | TEMPERATURE: 98.3 F

## 2024-03-29 LAB
BASOPHILS # BLD AUTO: 0.04 10*3/MM3 (ref 0–0.2)
BASOPHILS NFR BLD AUTO: 0.3 % (ref 0–1.5)
CYTO UR: NORMAL
DEPRECATED RDW RBC AUTO: 49 FL (ref 37–54)
EOSINOPHIL # BLD AUTO: 0.19 10*3/MM3 (ref 0–0.4)
EOSINOPHIL NFR BLD AUTO: 1.5 % (ref 0.3–6.2)
ERYTHROCYTE [DISTWIDTH] IN BLOOD BY AUTOMATED COUNT: 14.7 % (ref 12.3–15.4)
HCT VFR BLD AUTO: 21.6 % (ref 34–46.6)
HGB BLD-MCNC: 6.7 G/DL (ref 12–15.9)
IMM GRANULOCYTES # BLD AUTO: 0.13 10*3/MM3 (ref 0–0.05)
IMM GRANULOCYTES NFR BLD AUTO: 1 % (ref 0–0.5)
LAB AP CASE REPORT: NORMAL
LYMPHOCYTES # BLD AUTO: 2.07 10*3/MM3 (ref 0.7–3.1)
LYMPHOCYTES NFR BLD AUTO: 16.5 % (ref 19.6–45.3)
Lab: NORMAL
MCH RBC QN AUTO: 28.4 PG (ref 26.6–33)
MCHC RBC AUTO-ENTMCNC: 31 G/DL (ref 31.5–35.7)
MCV RBC AUTO: 91.5 FL (ref 79–97)
MONOCYTES # BLD AUTO: 0.87 10*3/MM3 (ref 0.1–0.9)
MONOCYTES NFR BLD AUTO: 6.9 % (ref 5–12)
NEUTROPHILS NFR BLD AUTO: 73.8 % (ref 42.7–76)
NEUTROPHILS NFR BLD AUTO: 9.24 10*3/MM3 (ref 1.7–7)
NRBC BLD AUTO-RTO: 0.2 /100 WBC (ref 0–0.2)
PATH REPORT.FINAL DX SPEC: NORMAL
PATH REPORT.GROSS SPEC: NORMAL
PLATELET # BLD AUTO: 232 10*3/MM3 (ref 140–450)
PMV BLD AUTO: 9.3 FL (ref 6–12)
RBC # BLD AUTO: 2.36 10*6/MM3 (ref 3.77–5.28)
WBC NRBC COR # BLD AUTO: 12.54 10*3/MM3 (ref 3.4–10.8)

## 2024-03-29 PROCEDURE — 85025 COMPLETE CBC W/AUTO DIFF WBC: CPT | Performed by: OBSTETRICS & GYNECOLOGY

## 2024-03-29 PROCEDURE — 25010000002 IRON SUCROSE PER 1 MG: Performed by: ADVANCED PRACTICE MIDWIFE

## 2024-03-29 PROCEDURE — 25810000003 SODIUM CHLORIDE 0.9 % SOLUTION 250 ML FLEX CONT: Performed by: ADVANCED PRACTICE MIDWIFE

## 2024-03-29 PROCEDURE — 99238 HOSP IP/OBS DSCHRG MGMT 30/<: CPT | Performed by: ADVANCED PRACTICE MIDWIFE

## 2024-03-29 RX ORDER — ACETAMINOPHEN 325 MG/1
650 TABLET ORAL EVERY 4 HOURS PRN
Start: 2024-03-29

## 2024-03-29 RX ORDER — IBUPROFEN 600 MG/1
600 TABLET ORAL EVERY 6 HOURS PRN
Qty: 30 TABLET | Refills: 0 | Status: SHIPPED | OUTPATIENT
Start: 2024-03-29

## 2024-03-29 RX ORDER — TRAMADOL HYDROCHLORIDE 50 MG/1
50 TABLET ORAL EVERY 6 HOURS PRN
Qty: 20 TABLET | Refills: 0 | Status: SHIPPED | OUTPATIENT
Start: 2024-03-29

## 2024-03-29 RX ADMIN — IBUPROFEN 600 MG: 600 TABLET, FILM COATED ORAL at 09:48

## 2024-03-29 RX ADMIN — PRENATAL VIT W/ FE FUMARATE-FA TAB 27-0.8 MG 1 TABLET: 27-0.8 TAB at 08:02

## 2024-03-29 RX ADMIN — SIMETHICONE 80 MG: 80 TABLET, CHEWABLE ORAL at 02:08

## 2024-03-29 RX ADMIN — IBUPROFEN 600 MG: 600 TABLET, FILM COATED ORAL at 04:06

## 2024-03-29 RX ADMIN — IRON SUCROSE 300 MG: 20 INJECTION, SOLUTION INTRAVENOUS at 08:02

## 2024-03-29 RX ADMIN — ACETAMINOPHEN 650 MG: 325 TABLET ORAL at 00:04

## 2024-03-29 RX ADMIN — ACETAMINOPHEN 650 MG: 325 TABLET ORAL at 06:11

## 2024-03-29 RX ADMIN — OXYCODONE HYDROCHLORIDE 5 MG: 5 TABLET ORAL at 09:56

## 2024-03-29 NOTE — PLAN OF CARE
Goal Outcome Evaluation:  Plan of Care Reviewed With: patient        Progress: improving       VSS, FF/M/U1 with scant bleeding, voiding, ambulating, pain within tolerable limits with ERAS, breastfeeding, resting between care.

## 2024-03-29 NOTE — LACTATION NOTE
Mother's Name: Andria Phone #: [phone]  Infant Name: Shani  : 3/[birth year]/24  Gestation: 39w3d  Day of life: 2  Birth weight: 8-5 (3770g) Discharge weight: 7-10.8 (3480g)  Weight Loss: -7.69%  24 hour Summary of Feeds: 12BF      Voids: 4 Stools: 2  Assistive devices (shields, shells, etc): NA  Significant Maternal history: , C/S, AMA, ADHD, Migraines, Miscarriages, D&C, Anxiety, Depression  Maternal Concerns: None at this time  Maternal Goal: Breastfeed-Attempted to breastfeed all children,  last child for 18 months w/o any difficulties   Mother's Medications: PNV, FE  Breastpump for home: Old plug in pump. Also Zommee   Ped follow up appt: 2 weeks with JOSEPH Can Lactation: 3/30/2024 at 1100    F/u with mother to discuss breastfeeding progress. Mother reports breastfeeding continues to go well. Does voice some concern with latch pain at times. Denies visible trauma. Infant began cueing to feed during visit. Observed mother latch infant to left breast in cradle hold. Infant latches easily but mother c/o pinching. Assessed latch and found lower lip to be slightly tucked. Gently pulled on chin and visualized lip flip outward, mother voiced immediate improvement. Assisted to latch to right breast in football hold as mother reports difficulty in obtaining comfortable positioning/latch on right breast. Mother also reports she has always had slow/low milk production on right breast and desires to maximize production on this side. Encourage continued bfing on this breast, use of haakaa, and post feed pumping as much as tolerated/able. Noted bilateral breasts with mild engorgement, left more than right. Right breast with plugged ducts palpable to outer parameter of breast. Advised mother to use hand pump to pump after this feeding to soften bilateral breasts. Mother appreciative of all education. Information provided for lactation appt tomorrow. Questions denied. Encouragement and support  provided. Breastfeeding after discharge handout provided as well.     Signs of Milk: Fullness, firmness, heaviness of breasts, leaking of milk.  Signs of Good Feed: Breast fullness prior to feed, breasts soft and comfortable after feeding. Infant content after feeding: calm, sleepy, relaxed and without continued hunger cues.  Signs of Plugged Ducts, Engorgement and Mastitis: Plugged ducts (milk entrapment in milk ducts)- small tender knots that often feel like little beans under breast tissue, usually tender. Massage on these areas of concern while breastfeeding or pumping to promote emptying.   Engorgement- fluid or excess milk, breasts become uncomfortably full, tight, firm (compare to the firmness of your cheek (mild), chin (moderate) or forehead (severe). First line of treatment should be to BREASTFEED, if breasts remain full feeling after a feeding, it may be necessary to pump, ONLY UNTIL BREASTS ARE SOFT AND COMFORTABLE. DO NOT OVER PUMP (complete emptying of breasts can trigger even more milk which will cause continued, recurrent Engorgement).  Mastitis- Infection of the breast tissue, most often caused by plugged ducts that are not adequately treated by emptying, recurrent trauma to nipples or breasts (cracked or bleeding nipples). Signs: redness, swelling, tender knots or fever to breasts as well as generalized fever >101 degrees F that is often sudden onset. Treatment of mastitis, BREASTFEED! Pump after breastfeeding to achieve COMPLETE emptying of affected breast, utilizing massage to areas of concern, may use cold compress to affected area only after breast emptying. May take anti-inflammatories i.e. Ibuprofen, Motrin. CALL your OB for assessment and continued treatment with Antibiotics to adequately treat mastitis.  Infant Care: Over the first 2 weeks it is important to keep record of infant's feeding routine (feeding times and durations), wet and dirty diaper frequency, stool color and any spit ups  that may occur.  Keep in mind, ALL babies will lose some weight initially (usually no more than 10% by day 3). Until infant returns to/ surpasses birth weight (which can take up to 2 weeks), it is important to offer feedings AT LEAST EVERY 3 HOURS. Remember, if you choose to supplement infant with formula or previously pumped milk, you should always pump in replacement of that feeding in order to promote and maintain a healthy milk supply!  Maternal Care: REST, sleep when the infant sleeps, stay hydrated (water is optimal) drink to thirst, increase caloric intake - breastfeeding mother's need an ADDITIONAL 500 calories per day , eat 3 meals/day as well as snacks in between, limit CAFFIENE intake to 2 cups/day. Ask your significant other, family members or friends for help when needed, taking advantage of meal trains, allowing others to help with laundry, house chores, etc can help you focus on what is most important early on after delivery… you and your infant, and breastfeeding!   Medications to CONTINUE: Prenatal Vitamins are important to continue taking while breastfeeding. Fish oil, magnesium/calcium supplements often are helpful to support Mothers and their milk supply as well. Tylenol, Ibuprofen, regular Zyrtec, Claritin are SAFE if you suffer from seasonal allergies. Flonase is safe and often an effective medication to take if suffering from sinus drainage/pressure.  Medications to AVOID: Benadryl, Sudafed, any medications including “DM” or have a drying effect to sinus drainage will also dry a mother's milk up. Birth control- your OB will want to address birth control options with you usually around 4-6 weeks postpartum, be sure to notify your MD if you continue to breastfeed as some birth controls may significantly decrease your milk supply. Herbals- some herbs may also decrease your milk supply: PEPPERMINT, MENTHOL in any form (candies, essential oils, teas, etc), so check labels and avoid using in  excess.  Pumping: Although we encourage you to focus on breastfeeding over the first 2-4 weeks, you will need to plan to begin pumping. We do recommend implementing pumping by the time infant is 4 weeks old. Pump 2-3 times per day immediately AFTER breastfeeding, it is normal to collect very small amounts initially, but the more consistently you pump, the more you will begin to collect. Store collected milk in refrigerator or freezer. You should also begin offering infant a bottle around 4 weeks. Remember to use slow flow nipples and PACE the bottle-feed. A bottle feed should take about as long as a breastfeeding session.

## 2024-03-29 NOTE — PROGRESS NOTES
JOSEPH Gallagher  [facility]  [address]  [place] [address]  Office [phone]  Fax [phone]      [facility]   PROGRESS NOTE    Post-Op Day 2 S/P   Subjective     Patient reports:  Pain is well controlled with Tylenol and Toradol.  She is ambulating. Tolerating diet. Voiding - without difficulty; flatus reported..  Vaginal bleeding is as much as expected. She denies vertigo, faintness, syncope, shortness of breath.      Objective      Vitals: Vital Signs Range for the last 24 hours  Temperature: Temp:  [97.7 °F (36.5 °C)-98.5 °F (36.9 °C)] 98.3 °F (36.8 °C)   Temp Source: Temp src: Oral   BP: BP: (105-132)/(51-72) 117/72   Pulse: Heart Rate:  [] 107   Respirations: Resp:  [16-18] 18   SPO2: SpO2:  [97 %-99 %] 98 %   O2 Amount (l/min):     O2 Devices Device (Oxygen Therapy): room air   Weight:              Physical Exam    Lungs Respirations even and unlabored.   Abdomen Soft and without significant tenderness. Fundus firm, nontender.   Incision  Dried drainage noted, no erythema, no swelling, well approximated, steri-strips intact   Extremities Calves non-tender. Trace edema of feet and lower extremities.     I reviewed the patient's new clinical results.  Lab Results (last 24 hours)       Procedure Component Value Units Date/Time    CBC & Differential [[id number]]  (Abnormal) Collected: 24    Specimen: Blood Updated: 24    Narrative:      The following orders were created for panel order CBC & Differential.  Procedure                               Abnormality         Status                     ---------                               -----------         ------                     CBC Auto Differential[[id number]]        Abnormal            Final result                 Please view results for these tests on the individual orders.    CBC Auto Differential [[id number]]  (Abnormal) Collected: 24    Specimen: Blood Updated: 24      WBC 12.54 10*3/mm3      RBC 2.36 10*6/mm3      Hemoglobin 6.7 g/dL      Hematocrit 21.6 %      MCV 91.5 fL      MCH 28.4 pg      MCHC 31.0 g/dL      RDW 14.7 %      RDW-SD 49.0 fl      MPV 9.3 fL      Platelets 232 10*3/mm3      Neutrophil % 73.8 %      Lymphocyte % 16.5 %      Monocyte % 6.9 %      Eosinophil % 1.5 %      Basophil % 0.3 %      Immature Grans % 1.0 %      Neutrophils, Absolute 9.24 10*3/mm3      Lymphocytes, Absolute 2.07 10*3/mm3      Monocytes, Absolute 0.87 10*3/mm3      Eosinophils, Absolute 0.19 10*3/mm3      Basophils, Absolute 0.04 10*3/mm3      Immature Grans, Absolute 0.13 10*3/mm3      nRBC 0.2 /100 WBC     Iron Profile [[id number]]  (Abnormal) Collected: 03/28/24 0748    Specimen: Blood Updated: 03/28/24 0926     Iron 27 mcg/dL      Iron Saturation (TSAT) 5 %      Transferrin 335 mg/dL      TIBC 499 mcg/dL     Ferritin [[id number]]  (Abnormal) Collected: 03/28/24 0748    Specimen: Blood Updated: 03/28/24 0926     Ferritin 9.32 ng/mL     Narrative:      Results may be falsely decreased if patient taking Biotin.      Tissue Pathology Exam [[id number]] Collected: 03/27/24 0830    Specimen: Tissue from Fallopian Tube, Left; Tissue from Fallopian Tube, Right Updated: 03/28/24 0915            External Prenatal Results       Pregnancy Outside Results - Transcribed From Office Records - See Scanned Records For Details       Test Value Date Time    ABO  O  03/27/24 0600    Rh  Positive  03/27/24 0600    Antibody Screen  Negative  03/27/24 0600    Varicella IgG       Rubella ^ Immune  11/05/20     Hgb  6.7 g/dL 03/29/24 0421       6.4 g/dL 03/28/24 0748       10.2 g/dL 03/27/24 0600       10.8 g/dL 01/25/24 1042       10.5 g/dL 12/15/23 0604       13.9 g/dL 09/04/23 1437    Hct  21.6 % 03/29/24 0421       20.3 % 03/28/24 0748       31.4 % 03/27/24 0600       32.7 % 01/25/24 1042       34.4 % 12/15/23 0604       42.3 % 09/04/23 1437    Glucose Fasting GTT       Glucose Tolerance Test 1 hour        Glucose Tolerance Test 3 hour       Gonorrhea (discrete)  Negative  24 1317    Chlamydia (discrete)  Negative  24 1317    RPR ^ Negative  20     VDRL       Syphilis Antibody       HBsAg ^ Negative  20     Herpes Simplex Virus PCR ^ negative  20     Herpes Simplex VIrus Culture       HIV ^ Non-Reactive  20     Hep C RNA Quant PCR       Hep C Antibody       AFP       Group B Strep  Negative  24 1317    GBS Susceptibility to Clindamycin       GBS Susceptibility to Erythromycin       Fetal Fibronectin  Negative  12/15/23 0624    Genetic Testing, Maternal Blood                 Drug Screening       Test Value Date Time    Urine Drug Screen       Amphetamine Screen  Negative  23 1003    Barbiturate Screen  Negative  23 1003    Benzodiazepine Screen  Negative  23 1003    Methadone Screen  Negative  23 1003    Phencyclidine Screen  Negative  23 1003    Opiates Screen  Negative  23 1003    THC Screen  Negative  23 1003    Cocaine Screen       Propoxyphene Screen  Negative  23 1003    Buprenorphine Screen  Negative  23 1003    Methamphetamine Screen       Oxycodone Screen  Negative  23 1003    Tricyclic Antidepressants Screen  Negative  23 1003              Legend    ^: Historical                            Assessment & Plan        Encounter for maternal care for low transverse scar from repeat  delivery      Assessment:    Andria Ureña is Day 2  post-partum  , Low Transverse   .       Plan:  Continue postpartum and postoperative plan of care. Discharge instructions provided, including reasons to call the office or return to the hospital. Encouraged to resume oral iron post discharge and discussed plan to repeat hemoglobin at postpartum follow-up.       Plan for discharge reviewed with Dr. Thomas.     This note has been signed electronically.    Lucia Robert, DNP, APRN, CNM, RNC-OB  3/29/2024  08:53  CDT

## 2024-03-29 NOTE — LACTATION NOTE
Mother's Name: Andria Phone #: [phone]  Infant Name: Shani  : 3/27/24  Gestation: 39w3d  Day of life: 1  Birth weight: 8-5 (3770g) Discharge weight:  Weight Loss: -4.11%  24 hour Summary of Feeds: 5 BF + Attempts + gtts  Voids: 2 Stools: 4  Assistive devices (shields, shells, etc):  Significant Maternal history: , C/S, AMA, ADHD, Migraines, Miscarriages, D&C, Anxiety, Depression  Maternal Concerns: None at this time  Maternal Goal: Breastfeed-Attempted to breastfeed all children,  last child for 18 months w/o any difficulties   Mother's Medications: PNV, FE  Breastpump for home: Old plug in pump. Wants hands free pump. Rx faxed to [facility].  Ped follow up appt:     Patient reports breastfeeding is improving. States they had a rough night last night, however, infant is doing much better today. She is feeding infant on demand. Pain with feedings denied. Much encouragement provided for breastfeeding success thus far. Lactation support offered. Questions/concerns denied at this time.

## 2024-03-29 NOTE — DISCHARGE SUMMARY
Lucia Robert, APRN  [facility]  [address]  [place] [id number]  Office [phone]  Fax [phone]    Discharge Summary     Cody  Andria Ureña  : 1985  MRN: [id number]  CSN: [id number]    Date of Admission: 3/27/2024   Date of Discharge:  3/29/2024   Delivering Physician: Neris Nelson MD       Admission Diagnosis: Encounter for maternal care for low transverse scar from repeat  delivery [O34.211]   Discharge Diagnosis: Pregnancy at 39w3d - delivered       Procedures: Repeat  (LTCS) with bilateral total salpingectomy     Hospital Course  See the completed operative report for details regarding antepartum course and delivery.    Her postoperative course was unremarkable.  On POD # 2 she felt like she was ready for discharge.  She was evaluated by Dr. Thomas who agreed she was able to be discharged to home.  She had no febrile morbidity. She had normal bowel and bladder function and was hemodynamically stable.  Her wound was healing well without obvious signs of infections.    Infant  female  fetus weighing 3770 g (8 lb 5 oz)   Apgars -  4 @ 1 minute /  9 @ 5 minutes.    Discharge labs  Lab Results   Component Value Date    WBC 12.54 (H) 2024    HGB 6.7 (C) 2024    HCT 21.6 (L) 2024     2024       Discharge Medications     Discharge Medications        New Medications        Instructions Start Date   acetaminophen 325 MG tablet  Commonly known as: TYLENOL   650 mg, Oral, Every 4 Hours PRN      ibuprofen 600 MG tablet  Commonly known as: ADVIL,MOTRIN   600 mg, Oral, Every 6 Hours PRN      traMADol 50 MG tablet  Commonly known as: Ultram   50 mg, Oral, Every 6 Hours PRN             Continue These Medications        Instructions Start Date   cyclobenzaprine 10 MG tablet  Commonly known as: FLEXERIL   10 mg, Oral, 3 Times Daily PRN      FeroSul 325 (65 Fe) MG tablet  Generic drug: ferrous  sulfate       Prenatal 27-1 27-1 MG tablet tablet                External Prenatal Results       Pregnancy Outside Results - Transcribed From Office Records - See Scanned Records For Details       Test Value Date Time    ABO  O  03/27/24 0600    Rh  Positive  03/27/24 0600    Antibody Screen  Negative  03/27/24 0600    Varicella IgG       Rubella ^ Immune  11/05/20     Hgb  6.7 g/dL 03/29/24 0421       6.4 g/dL 03/28/24 0748       10.2 g/dL 03/27/24 0600       10.8 g/dL 01/25/24 1042       10.5 g/dL 12/15/23 0604       13.9 g/dL 09/04/23 1437    Hct  21.6 % 03/29/24 0421       20.3 % 03/28/24 0748       31.4 % 03/27/24 0600       32.7 % 01/25/24 1042       34.4 % 12/15/23 0604       42.3 % 09/04/23 1437    Glucose Fasting GTT       Glucose Tolerance Test 1 hour       Glucose Tolerance Test 3 hour       Gonorrhea (discrete)  Negative  03/04/24 1317    Chlamydia (discrete)  Negative  03/04/24 1317    RPR ^ Negative  11/05/20     VDRL       Syphilis Antibody       HBsAg ^ Negative  11/05/20     Herpes Simplex Virus PCR ^ negative  11/05/20     Herpes Simplex VIrus Culture       HIV ^ Non-Reactive  11/05/20     Hep C RNA Quant PCR       Hep C Antibody       AFP       Group B Strep  Negative  03/04/24 1317    GBS Susceptibility to Clindamycin       GBS Susceptibility to Erythromycin       Fetal Fibronectin  Negative  12/15/23 0624    Genetic Testing, Maternal Blood                 Drug Screening       Test Value Date Time    Urine Drug Screen       Amphetamine Screen  Negative  01/26/23 1003    Barbiturate Screen  Negative  01/26/23 1003    Benzodiazepine Screen  Negative  01/26/23 1003    Methadone Screen  Negative  01/26/23 1003    Phencyclidine Screen  Negative  01/26/23 1003    Opiates Screen  Negative  01/26/23 1003    THC Screen  Negative  01/26/23 1003    Cocaine Screen       Propoxyphene Screen  Negative  01/26/23 1003    Buprenorphine Screen  Negative  01/26/23 1003    Methamphetamine Screen       Oxycodone  Screen  Negative  01/26/23 1003    Tricyclic Antidepressants Screen  Negative  01/26/23 1003              Legend    ^: Historical                            Discharge Disposition Home or Self Care   Condition on Discharge: good   Follow-up: 2 weeks with Dr. Nelson      Plan for discharge reviewed with Dr. Thomas.    This note has been signed electronically.    Lucia Robert DNP, APRN, CNM, RNC-OB  3/29/2024

## 2024-03-30 ENCOUNTER — HOSPITAL ENCOUNTER (OUTPATIENT)
Dept: LACTATION | Facility: HOSPITAL | Age: 39
Discharge: HOME | End: 2024-03-30
Payer: MEDICAID

## 2024-03-30 NOTE — PAYOR COMM NOTE
"[place] HOME 3-29-24    Deedee Graham (38 y.o. Female)       Date of Birth   1985    Social Security Number       Address   [address] [id number]    Home Phone   [phone]    MRN   [id number]       Taoism   Voodoo    Marital Status                               Admission Date   3/27/24    Admission Type   Elective    Admitting Provider   Vargas Dejesus MD    Attending Provider       Department, Room/Bed   [facility] MOTHER BABY 2A, M240/1       Discharge Date   3/29/2024    Discharge Disposition   Home or Self Care    Discharge Destination                                 Attending Provider: (none)   Allergies: No Known Allergies    Isolation: None   Infection: COVID (History) (21)   Code Status: Prior    Ht: 154.9 cm (61\")   Wt: 81.7 kg (180 lb 3.2 oz)    Admission Cmt: None   Principal Problem: Encounter for maternal care for low transverse scar from repeat  delivery [O34.211]                   Active Insurance as of 3/27/2024       Primary Coverage       Payor Plan Insurance Group Employer/Plan Group    ANTHEM MEDICAID ANTHEM MEDICAID [id number]       Payor Plan Address Payor Plan Phone Number Payor Plan Fax Number Effective Dates    [address] [address] [phone]  3/1/2020 - None Entered    [facility] [phone]         Subscriber Name Subscriber Birth Date Member ID       DEEDEE GRAHAM 1985 [id number]                     Emergency Contacts        (Rel.) Home Phone Work Phone Mobile Phone    James Graham (Spouse) [phone]                 Discharge Summary        Lucia Robert APRN at 24 0906       Attestation signed by Brian Thomas MD at 24 0931    I have reviewed this documentation and agree.                                                          JOSEPH Gallagher  [facility]  [address]  [place] [address]  Office [phone]  Fax " [phone]    Discharge Summary     Dylan Ureña  : 1985  MRN: [id number]  CSN: [id number]    Date of Admission: 3/27/2024   Date of Discharge:  3/29/2024   Delivering Physician: Neris Nelson MD       Admission Diagnosis: Encounter for maternal care for low transverse scar from repeat  delivery [O34.211]   Discharge Diagnosis: Pregnancy at 39w3d - delivered       Procedures: Repeat  (LTCS) with bilateral total salpingectomy     Hospital Course  See the completed operative report for details regarding antepartum course and delivery.    Her postoperative course was unremarkable.  On POD # 2 she felt like she was ready for discharge.  She was evaluated by Dr. Thomas who agreed she was able to be discharged to home.  She had no febrile morbidity. She had normal bowel and bladder function and was hemodynamically stable.  Her wound was healing well without obvious signs of infections.    Infant  female  fetus weighing 3770 g (8 lb 5 oz)   Apgars -  4 @ 1 minute /  9 @ 5 minutes.    Discharge labs  Lab Results   Component Value Date    WBC 12.54 (H) 2024    HGB 6.7 (C) 2024    HCT 21.6 (L) 2024     2024       Discharge Medications     Discharge Medications        New Medications        Instructions Start Date   acetaminophen 325 MG tablet  Commonly known as: TYLENOL   650 mg, Oral, Every 4 Hours PRN      ibuprofen 600 MG tablet  Commonly known as: ADVIL,MOTRIN   600 mg, Oral, Every 6 Hours PRN      traMADol 50 MG tablet  Commonly known as: Ultram   50 mg, Oral, Every 6 Hours PRN             Continue These Medications        Instructions Start Date   cyclobenzaprine 10 MG tablet  Commonly known as: FLEXERIL   10 mg, Oral, 3 Times Daily PRN      FeroSul 325 (65 Fe) MG tablet  Generic drug: ferrous sulfate       Prenatal 27-1 27-1 MG tablet tablet                External Prenatal Results       Pregnancy Outside Results - Transcribed From Office  Records - See Scanned Records For Details       Test Value Date Time    ABO  O  03/27/24 0600    Rh  Positive  03/27/24 0600    Antibody Screen  Negative  03/27/24 0600    Varicella IgG       Rubella ^ Immune  11/05/20     Hgb  6.7 g/dL 03/29/24 0421       6.4 g/dL 03/28/24 0748       10.2 g/dL 03/27/24 0600       10.8 g/dL 01/25/24 1042       10.5 g/dL 12/15/23 0604       13.9 g/dL 09/04/23 1437    Hct  21.6 % 03/29/24 0421       20.3 % 03/28/24 0748       31.4 % 03/27/24 0600       32.7 % 01/25/24 1042       34.4 % 12/15/23 0604       42.3 % 09/04/23 1437    Glucose Fasting GTT       Glucose Tolerance Test 1 hour       Glucose Tolerance Test 3 hour       Gonorrhea (discrete)  Negative  03/04/24 1317    Chlamydia (discrete)  Negative  03/04/24 1317    RPR ^ Negative  11/05/20     VDRL       Syphilis Antibody       HBsAg ^ Negative  11/05/20     Herpes Simplex Virus PCR ^ negative  11/05/20     Herpes Simplex VIrus Culture       HIV ^ Non-Reactive  11/05/20     Hep C RNA Quant PCR       Hep C Antibody       AFP       Group B Strep  Negative  03/04/24 1317    GBS Susceptibility to Clindamycin       GBS Susceptibility to Erythromycin       Fetal Fibronectin  Negative  12/15/23 0624    Genetic Testing, Maternal Blood                 Drug Screening       Test Value Date Time    Urine Drug Screen       Amphetamine Screen  Negative  01/26/23 1003    Barbiturate Screen  Negative  01/26/23 1003    Benzodiazepine Screen  Negative  01/26/23 1003    Methadone Screen  Negative  01/26/23 1003    Phencyclidine Screen  Negative  01/26/23 1003    Opiates Screen  Negative  01/26/23 1003    THC Screen  Negative  01/26/23 1003    Cocaine Screen       Propoxyphene Screen  Negative  01/26/23 1003    Buprenorphine Screen  Negative  01/26/23 1003    Methamphetamine Screen       Oxycodone Screen  Negative  01/26/23 1003    Tricyclic Antidepressants Screen  Negative  01/26/23 1003              Legend    ^: Historical                             Discharge Disposition Home or Self Care   Condition on Discharge: good   Follow-up: 2 weeks with Dr. Nelson      Plan for discharge reviewed with Dr. Thomas.    This note has been signed electronically.    Lucia Robert DNP, APRN, CNM, RNC-OB  3/29/2024        Electronically signed by Brian Thomas MD at 03/29/24 0961

## 2024-03-30 NOTE — LACTATION NOTE
Mother's Name: Andria Ureña  G/P:   7/5  Breastfeeding Hx: Mother bf'ed all children for varying amounts of time:  6 wks - 18 months  Significant Medical History:  thrush and mastitis with now 3 year old  Maternal Breast Assessment: bilateral slight engorgement     Infant's Name:  Shani Ureña  Date of Birth:   [birth year]   Gestational age at Birth: 39-3  Age:    3 days  Physician:   JOSEPH Can                     Reason for Visit:  Weight check, feeding eval          Infant's Birth weight:  8-5 3770g  Previous Weight:  7-10.8 3480g      Today's Weight:     Wt Loss:  5.7%    Feeding History Since Discharge/Last Lactation Appt.:exclusively bfing- Infant feeds every 2-3 hours with some cluster feeds, as well as having to wake her at 3 hours luciano at times. Some pain with feeds.     Past 24 Hours Voids/Stools:    3+ / 6   Color of Stool: dark brown liquid    Last feed 1 1/2 hours prior to appmt        Right Breast: 3550g 10 mins +6 mls  Left  Breast: 3510g 10 mins +10 mls    Final Wt:  7-11.9    Total Minutes:    20     Total Weight Gain:      16    gms (1/2 ounce)    Average feed amount:  15-30 mls, or 1/2 - 1 oz every feeding.    Interventions: Assisted with deep latch at breast in cross cradle hold. Also taught proper disengagement as needed. Applied Haakaa with 20 mls collected on one breast.  Pump:  RootsRatedmee wearable.    Education: milk drying agents, feeding frequency    Feeding Plan:   Keep feeding at cues going no longer than 3 hours between feeds.  Use Haakaa with every bf.   Pump, use Haakaa, or manual pump as needed AFTER bf's if still full.  Come to [facility] if possible:  4/11/24, 1 pm    Plan of Care:  Interventions accomplished satisfactorily, requires no further action.    Future Appointments:  Lactation: As desired by mother:  [phone]  Physician: ROSE Bull, at 2 wks old  Signature: PERRI Orona  Date:  3/30/24

## 2024-04-08 ENCOUNTER — HOSPITAL ENCOUNTER (EMERGENCY)
Facility: HOSPITAL | Age: 39
Discharge: HOME | End: 2024-04-08
Admitting: EMERGENCY MEDICINE
Payer: MEDICAID

## 2024-04-08 ENCOUNTER — MATERNAL SCREENING (OUTPATIENT)
Dept: CALL CENTER | Facility: HOSPITAL | Age: 39
End: 2024-04-08
Payer: MEDICAID

## 2024-04-08 ENCOUNTER — APPOINTMENT (OUTPATIENT)
Dept: CT IMAGING | Facility: HOSPITAL | Age: 39
End: 2024-04-08
Payer: MEDICAID

## 2024-04-08 VITALS
WEIGHT: 161 LBS | RESPIRATION RATE: 18 BRPM | BODY MASS INDEX: 30.4 KG/M2 | SYSTOLIC BLOOD PRESSURE: 116 MMHG | TEMPERATURE: 98.3 F | HEART RATE: 87 BPM | DIASTOLIC BLOOD PRESSURE: 83 MMHG | HEIGHT: 61 IN | OXYGEN SATURATION: 96 %

## 2024-04-08 DIAGNOSIS — R10.30 LOWER ABDOMINAL PAIN: ICD-10-CM

## 2024-04-08 DIAGNOSIS — N95.0 POSTMENOPAUSAL VAGINAL BLEEDING: Primary | ICD-10-CM

## 2024-04-08 LAB
ALBUMIN SERPL-MCNC: 4.1 G/DL (ref 3.5–5.2)
ALBUMIN/GLOB SERPL: 1.2 G/DL
ALP SERPL-CCNC: 139 U/L (ref 39–117)
ALT SERPL W P-5'-P-CCNC: 18 U/L (ref 1–33)
ANION GAP SERPL CALCULATED.3IONS-SCNC: 12 MMOL/L (ref 5–15)
AST SERPL-CCNC: 22 U/L (ref 1–32)
BACTERIA UR QL AUTO: ABNORMAL /HPF
BASOPHILS # BLD AUTO: 0.03 10*3/MM3 (ref 0–0.2)
BASOPHILS NFR BLD AUTO: 0.3 % (ref 0–1.5)
BILIRUB SERPL-MCNC: 0.2 MG/DL (ref 0–1.2)
BILIRUB UR QL STRIP: NEGATIVE
BUN SERPL-MCNC: 16 MG/DL (ref 6–20)
BUN/CREAT SERPL: 36.4 (ref 7–25)
CALCIUM SPEC-SCNC: 9.4 MG/DL (ref 8.6–10.5)
CHLORIDE SERPL-SCNC: 103 MMOL/L (ref 98–107)
CLARITY UR: CLEAR
CO2 SERPL-SCNC: 25 MMOL/L (ref 22–29)
COLOR UR: YELLOW
CREAT SERPL-MCNC: 0.44 MG/DL (ref 0.57–1)
DEPRECATED RDW RBC AUTO: 58 FL (ref 37–54)
EGFRCR SERPLBLD CKD-EPI 2021: 128.7 ML/MIN/1.73
EOSINOPHIL # BLD AUTO: 0.13 10*3/MM3 (ref 0–0.4)
EOSINOPHIL NFR BLD AUTO: 1.3 % (ref 0.3–6.2)
ERYTHROCYTE [DISTWIDTH] IN BLOOD BY AUTOMATED COUNT: 16.8 % (ref 12.3–15.4)
GLOBULIN UR ELPH-MCNC: 3.4 GM/DL
GLUCOSE SERPL-MCNC: 100 MG/DL (ref 65–99)
GLUCOSE UR STRIP-MCNC: NEGATIVE MG/DL
HCT VFR BLD AUTO: 32.1 % (ref 34–46.6)
HGB BLD-MCNC: 9.8 G/DL (ref 12–15.9)
HGB UR QL STRIP.AUTO: ABNORMAL
HOLD SPECIMEN: NORMAL
HOLD SPECIMEN: NORMAL
HYALINE CASTS UR QL AUTO: ABNORMAL /LPF
IMM GRANULOCYTES # BLD AUTO: 0.04 10*3/MM3 (ref 0–0.05)
IMM GRANULOCYTES NFR BLD AUTO: 0.4 % (ref 0–0.5)
INR PPP: 0.88 (ref 0.91–1.09)
KETONES UR QL STRIP: NEGATIVE
LEUKOCYTE ESTERASE UR QL STRIP.AUTO: NEGATIVE
LYMPHOCYTES # BLD AUTO: 1.65 10*3/MM3 (ref 0.7–3.1)
LYMPHOCYTES NFR BLD AUTO: 17.1 % (ref 19.6–45.3)
MAGNESIUM SERPL-MCNC: 2 MG/DL (ref 1.6–2.6)
MCH RBC QN AUTO: 29.3 PG (ref 26.6–33)
MCHC RBC AUTO-ENTMCNC: 30.5 G/DL (ref 31.5–35.7)
MCV RBC AUTO: 95.8 FL (ref 79–97)
MONOCYTES # BLD AUTO: 0.55 10*3/MM3 (ref 0.1–0.9)
MONOCYTES NFR BLD AUTO: 5.7 % (ref 5–12)
NEUTROPHILS NFR BLD AUTO: 7.27 10*3/MM3 (ref 1.7–7)
NEUTROPHILS NFR BLD AUTO: 75.2 % (ref 42.7–76)
NITRITE UR QL STRIP: NEGATIVE
NRBC BLD AUTO-RTO: 0 /100 WBC (ref 0–0.2)
PH UR STRIP.AUTO: 6.5 [PH] (ref 5–8)
PLATELET # BLD AUTO: 426 10*3/MM3 (ref 140–450)
PMV BLD AUTO: 9.1 FL (ref 6–12)
POTASSIUM SERPL-SCNC: 4 MMOL/L (ref 3.5–5.2)
PROT SERPL-MCNC: 7.5 G/DL (ref 6–8.5)
PROT UR QL STRIP: NEGATIVE
PROTHROMBIN TIME: 12.3 SECONDS (ref 11.8–14.8)
RBC # BLD AUTO: 3.35 10*6/MM3 (ref 3.77–5.28)
RBC # UR STRIP: ABNORMAL /HPF
REF LAB TEST METHOD: ABNORMAL
SODIUM SERPL-SCNC: 140 MMOL/L (ref 136–145)
SP GR UR STRIP: >=1.03 (ref 1–1.03)
SQUAMOUS #/AREA URNS HPF: ABNORMAL /HPF
UROBILINOGEN UR QL STRIP: ABNORMAL
WBC # UR STRIP: ABNORMAL /HPF
WBC NRBC COR # BLD AUTO: 9.67 10*3/MM3 (ref 3.4–10.8)
WHOLE BLOOD HOLD COAG: NORMAL
WHOLE BLOOD HOLD SPECIMEN: NORMAL

## 2024-04-08 PROCEDURE — 85025 COMPLETE CBC W/AUTO DIFF WBC: CPT

## 2024-04-08 PROCEDURE — 25810000003 SODIUM CHLORIDE 0.9 % SOLUTION

## 2024-04-08 PROCEDURE — 80053 COMPREHEN METABOLIC PANEL: CPT

## 2024-04-08 PROCEDURE — 74177 CT ABD & PELVIS W/CONTRAST: CPT

## 2024-04-08 PROCEDURE — 99285 EMERGENCY DEPT VISIT HI MDM: CPT

## 2024-04-08 PROCEDURE — 81001 URINALYSIS AUTO W/SCOPE: CPT

## 2024-04-08 PROCEDURE — 83735 ASSAY OF MAGNESIUM: CPT

## 2024-04-08 PROCEDURE — 85610 PROTHROMBIN TIME: CPT

## 2024-04-08 PROCEDURE — 25510000001 IOPAMIDOL 61 % SOLUTION

## 2024-04-08 RX ORDER — SODIUM CHLORIDE 0.9 % (FLUSH) 0.9 %
10 SYRINGE (ML) INJECTION AS NEEDED
Status: DISCONTINUED | OUTPATIENT
Start: 2024-04-08 | End: 2024-04-09 | Stop reason: HOSPADM

## 2024-04-08 RX ADMIN — IOPAMIDOL 100 ML: 612 INJECTION, SOLUTION INTRAVENOUS at 21:27

## 2024-04-08 RX ADMIN — SODIUM CHLORIDE 1000 ML: 9 INJECTION, SOLUTION INTRAVENOUS at 22:35

## 2024-04-08 NOTE — OUTREACH NOTE
Maternal Screening Survey      Flowsheet Row Responses   Facility patient discharged from? [facility]   Attempt successful? No   Unsuccessful attempts Attempt 1              Sara PINZON - Registered Nurse

## 2024-04-08 NOTE — OUTREACH NOTE
Maternal Screening Survey      Flowsheet Row Responses   Facility patient discharged from? [facility]   Attempt successful? No   Unsuccessful attempts Attempt 2              Sara PINZON - Registered Nurse

## 2024-04-09 ENCOUNTER — MATERNAL SCREENING (OUTPATIENT)
Dept: CALL CENTER | Facility: HOSPITAL | Age: 39
End: 2024-04-09
Payer: MEDICAID

## 2024-04-09 LAB — HOLD SPECIMEN: NORMAL

## 2024-04-09 NOTE — OUTREACH NOTE
Maternal Screening Survey      Flowsheet Row Responses   Facility patient discharged from? [facility]   Attempt successful? No   Unsuccessful attempts Attempt 3   Revoke Decline to participate              Bryan ERICKSON - Registered Nurse

## 2024-04-09 NOTE — ED PROVIDER NOTES
"Subjective   History of Present Illness  Patient is a 38-year-old female that presents to the emergency department for vaginal bleeding and lower abdominal cramping.  Patient reports that she had a  with tubal ligation performed on 3/27/2024 by  at 39 weeks gestation.  Patient was discharged on 3/29/2024.  Patient states she was anemic throughout her stay and was instructed to take iron pills.  Patient reports she has been unable to tolerate iron pills by mouth as she will vomit immediately after taking them.  Patient states she has had intermittent episodes of vaginal bleeding described as \"spotting \"since her discharge on the .  She states that today the vaginal bleeding had increased tremendously.  Patient reports that earlier in the day she was going through about a pad an hour for 3 to 4 hours.  She states that the bleeding has since tapered off but she was also experiencing lower abdominal pain described as a very severe menstrual cramping.  Patient reports she was also very lightheaded throughout entire day.  She states that she tried to take iron to see if this would help relieve symptoms but was unable to tolerate iron and did vomit once.  Patient states she was trying to follow-up with OB/GYN on Wednesday but due to the increased amount of bleeding and pain was advised to report to the ED today.  Patient reports she is breast-feeding and denies any other complaints.  Denies any fevers, body aches, chills.  Denies any abnormal discharge from surgical incision and reports she feels it is healing well.  She denies any chest pain, shortness of breath, constipation, or diarrhea.  Denies any dizziness, palpitations, headache, or near syncope.      Review of Systems   Constitutional:  Positive for fatigue.   Gastrointestinal:  Positive for abdominal pain, nausea and vomiting.   Genitourinary:  Positive for pelvic pain and vaginal bleeding.   Skin:  Positive for pallor.   Neurological:  " Positive for light-headedness.   All other systems reviewed and are negative.      Past Medical History:   Diagnosis Date    ADHD     Anxiety     Depression     Migraine     Recurrent pregnancy loss, antepartum condition or complication        No Known Allergies    Past Surgical History:   Procedure Laterality Date     SECTION       SECTION N/A 2021    Procedure: REPEAT  SECTION WITHOUT TUBAL LIGATION;  Surgeon: Priya Moreno MD;  Location: [facility] [place] LABOR DELIVERY;  Service: Obstetrics/Gynecology;  Laterality: N/A;     SECTION WITH TUBAL Bilateral 3/27/2024    Procedure: REPEAT  SECTION WITH TUBAL LIGATION;  Surgeon: Neris Nelson MD;  Location: [facility] [place] LABOR DELIVERY;  Service: Obstetrics/Gynecology;  Laterality: Bilateral;    D & C WITH SUCTION      EXPLORATORY LAPAROTOMY         Family History   Problem Relation Age of Onset    Hyperlipidemia Mother     Cancer Father     Stroke Maternal Grandfather     Stroke Paternal Grandfather     Hyperlipidemia Sister     Hyperlipidemia Sister     Miscarriages / Stillbirths Sister         Katelyn also had 2 miscarriages    Vision loss Sister     Hyperlipidemia Brother     Hyperlipidemia Brother        Social History     Socioeconomic History    Marital status:    Tobacco Use    Smoking status: Never     Passive exposure: Never    Smokeless tobacco: Never   Vaping Use    Vaping status: Never Used   Substance and Sexual Activity    Alcohol use: No    Drug use: No    Sexual activity: Yes     Partners: Male     Birth control/protection: Rhythm, Coitus interruptus           Objective   Physical Exam  Vitals and nursing note reviewed. Exam conducted with a chaperone present (Perla RN).   Constitutional:       Appearance: Normal appearance.      Comments: Nontoxic appearing. In no acute distress.    HENT:      Head: Normocephalic and atraumatic.      Right Ear: External ear normal.      Left Ear: External ear normal.       Nose: Nose normal.      Mouth/Throat:      Mouth: Mucous membranes are moist.      Pharynx: Oropharynx is clear.   Eyes:      Extraocular Movements: Extraocular movements intact.      Conjunctiva/sclera: Conjunctivae normal.      Pupils: Pupils are equal, round, and reactive to light.   Cardiovascular:      Rate and Rhythm: Normal rate and regular rhythm.      Pulses: Normal pulses.      Heart sounds: Normal heart sounds.   Pulmonary:      Effort: Pulmonary effort is normal. No respiratory distress.      Breath sounds: Normal breath sounds. No wheezing.   Chest:      Chest wall: No tenderness.   Abdominal:      General: Abdomen is protuberant. Bowel sounds are normal. There is no distension.      Palpations: Abdomen is soft.      Tenderness: There is abdominal tenderness in the suprapubic area. There is no right CVA tenderness, left CVA tenderness, guarding or rebound.      Comments: Surgical incision does not appear to have any signs of infection.  No obvious drainage, erythema, or opening to incision noted.  Steri-Strips are still present.   Genitourinary:     Vagina: Tenderness and bleeding present. No vaginal discharge, erythema or prolapsed vaginal walls.      Comments: Minimal amount of dark red vaginal bleeding noted in vaginal canal.  No obvious signs of infection or abnormal discharge noted.  Musculoskeletal:         General: Normal range of motion.      Cervical back: Normal range of motion and neck supple.      Right lower leg: No edema.      Left lower leg: No edema.   Skin:     General: Skin is warm and dry.      Capillary Refill: Capillary refill takes less than 2 seconds.      Coloration: Skin is pale.   Neurological:      General: No focal deficit present.      Mental Status: She is alert and oriented to person, place, and time. Mental status is at baseline.   Psychiatric:         Mood and Affect: Mood normal.         Behavior: Behavior normal.         Thought Content: Thought content normal.          Judgment: Judgment normal.       Labs Reviewed   COMPREHENSIVE METABOLIC PANEL - Abnormal; Notable for the following components:       Result Value    Glucose 100 (*)     Creatinine 0.44 (*)     Alkaline Phosphatase 139 (*)     BUN/Creatinine Ratio 36.4 (*)     All other components within normal limits    Narrative:     GFR Normal >60  Chronic Kidney Disease <60  Kidney Failure <15     PROTIME-INR - Abnormal; Notable for the following components:    INR 0.88 (*)     All other components within normal limits   URINALYSIS W/ CULTURE IF INDICATED - Abnormal; Notable for the following components:    Blood, UA Large (3+) (*)     All other components within normal limits    Narrative:     In absence of clinical symptoms, the presence of pyuria, bacteria, and/or nitrites on the urinalysis result does not correlate with infection.   CBC WITH AUTO DIFFERENTIAL - Abnormal; Notable for the following components:    RBC 3.35 (*)     Hemoglobin 9.8 (*)     Hematocrit 32.1 (*)     MCHC 30.5 (*)     RDW 16.8 (*)     RDW-SD 58.0 (*)     Lymphocyte % 17.1 (*)     Neutrophils, Absolute 7.27 (*)     All other components within normal limits   URINALYSIS, MICROSCOPIC ONLY - Abnormal; Notable for the following components:    RBC, UA 21-50 (*)     WBC, UA 3-5 (*)     All other components within normal limits   MAGNESIUM - Normal   RAINBOW DRAW    Narrative:     The following orders were created for panel order Java Draw.  Procedure                               Abnormality         Status                     ---------                               -----------         ------                     Green Top (Gel)[[id number]]                                  Final result               Lavender Top[[id number]]                                     Final result               Red Top[[id number]]                                          Final result               Gutierrez Top[[id number]]                                         In process                "  Light Blue Top[[id number]]                                   Final result                 Please view results for these tests on the individual orders.   GREEN TOP   LAVENDER TOP   RED TOP   LIGHT BLUE TOP   CBC AND DIFFERENTIAL    Narrative:     The following orders were created for panel order CBC & Differential.  Procedure                               Abnormality         Status                     ---------                               -----------         ------                     CBC Auto Differential[[id number]]        Abnormal            Final result                 Please view results for these tests on the individual orders.   GRAY TOP        Procedures           ED Course  ED Course as of 24 2358   Mon 2024   2240 Spoke with JOSEPH Hernandez on-call with OB/GYN.  She states that they will follow-up with patient in office and patient is good for discharge. [KF]      ED Course User Index  [KF] Karen Wilson APRN                                             Medical Decision Making  Andria Ureña is a 38 y.o. female who presents to the ED  for vaginal bleeding and lower abdominal cramping.  Patient reports that she had a  with tubal ligation performed on 3/27/2024 by  at 39 weeks gestation.  Patient was discharged on 3/29/2024.  Patient states she was anemic throughout her stay and was instructed to take iron pills.  Patient reports she has been unable to tolerate iron pills by mouth as she will vomit immediately after taking them.  Patient states she has had intermittent episodes of vaginal bleeding described as \"spotting \"since her discharge on the .  She states that today the vaginal bleeding had increased tremendously.  Patient reports that earlier in the day she was going through about a pad an hour for 3 to 4 hours.  She states that the bleeding has since tapered off but she was also experiencing lower abdominal pain described as a very severe menstrual " cramping.  Patient reports she was also very lightheaded throughout entire day.  She states that she tried to take iron to see if this would help relieve symptoms but was unable to tolerate iron and did vomit once.  Patient states she was trying to follow-up with OB/GYN on Wednesday but due to the increased amount of bleeding and pain was advised to report to the ED today.  Patient reports she is breast-feeding and denies any other complaints.  Denies any fevers, body aches, chills.  Denies any abnormal discharge from surgical incision and reports she feels it is healing well.  She denies any chest pain, shortness of breath, constipation, or diarrhea.  Denies any dizziness, palpitations, headache, or near syncope.    Patient was non-toxic appearing on arrival. No acute distress was noted.  Vital signs stable.    Past medical history, surgical history, and medication regimen reviewed.     Previous notes, labs, imaging, and more reviewed.    Patient's presentation raises suspicion for differentials including, but not limited to, postpartum hemorrhage, retained products, postoperative complication, symptomatic anemia.    Please refer to above section of note for lab and imaging results that were reviewed and interpreted by radiology as well as attending physician.     Medications  sodium chloride 0.9 % flush 10 mL (has no administration in time range)  sodium chloride 0.9 % bolus 1,000 mL (0 mL Intravenous Stopped 4/8/24 2248)  iopamidol (ISOVUE-300) 61 % injection 100 mL (100 mL Intravenous Given 4/8/24 2127)     Spoke with Yesica Myers APRN on-call with OB/GYN regarding CT results and patient's presentation.  She states that patient okay to be discharged and she will follow-up with her in clinic outpatient.    Given findings described above, patient's presentation is likely consistent with post partum vaginal bleeding.     I had an in-depth discussion with the patient as well as family present bedside regarding all  lab and imaging results completed during today's ED encounter.  We discussed that patient has had improvement in hemoglobin and all other lab work was unremarkable during today's ED encounter. I discussed that patient will need to have a close follow-up with OB/GYN.  Patient was also educated on concerning signs and symptoms that would warrant quick return to the ED and she verbalized understanding of this. I answered all the questions regarding the emergency department evaluation, diagnosis, and treatment plan in plain and simple language that was understandable. We discussed that due to always having some diagnostic uncertainty while in the ER, there is always a chance that symptoms may change or new symptoms may reveal themselves after being discharged. Because of this, I stressed the importance of Andria following up with their OBGYN. Patient informed that appointment will need to be done by calling their office to set up an appointment within the next few days or as soon as reasonably possible so that the symptoms can be re-evaluated for improvement or for any other questions. I also gave Andria common sense return precautions and prompted patient to return to the emergency department within 24 - 48hrs if there are any new, worsening, or concerning symptoms. The patient verbalized understanding of the discharge instructions and agreed with them. Andria was discharged in stable condition.     Dragon disclaimer:  Parts of this note may be an electronic transcription/translation of spoken language to printed text using the Dragon dictation system.       Problems Addressed:  Lower abdominal pain: complicated acute illness or injury  Postmenopausal vaginal bleeding: complicated acute illness or injury    Amount and/or Complexity of Data Reviewed  Labs: ordered.  Radiology: ordered.    Risk  Prescription drug management.        Final diagnoses:   Postmenopausal vaginal bleeding   Lower abdominal pain       ED  Disposition  ED Disposition       ED Disposition   Discharge    Condition   Stable    Comment   --               Vicente Bean MD  [address]  [address]  [place] [address]  [phone]    Schedule an appointment as soon as possible for a visit       Neris Nelson MD  [address]  [place] [address]  [phone]    Schedule an appointment as soon as possible for a visit       [facility] EMERGENCY DEPARTMENT  [address] [id number] [phone]    If symptoms worsen         Medication List      No changes were made to your prescriptions during this visit.            Karen Wilson, APRN  04/08/24 0574

## 2024-04-09 NOTE — DISCHARGE INSTRUCTIONS
It was very nice to meet you, Andria. Thank you for allowing us to take care of you today at [facility].    Today you were seen in the emergency department for your symptoms. Please understand that an ER evaluation is just the start of your evaluation. We do the best we can, but we are often unable to fully find what is causing your symptoms from one evaluation.  Because of this, the goal is to determine whether you need to be evaluated in the hospital or if it is safe for you to go home and see other doctors provided such as primary care physicians or specialist on an outpatient basis.     Like we discussed, I strongly urge that you follow up with your primary care doctor and OBGYN. Please call their office to set up an appointment as soon as possible so that you can be re-evaluated for improvement in your symptoms or for any other questions.  I have provided the information needed, including phone number, to call to set up an appointment below in these discharge papers.     Educational material has also been provided in the following pages regarding what we have discussed today.     Please return to the emergency room within 12-48 hours if you experience symptoms such as the following:   Fever, chills, chest pain or shortness of breath, pain with inspiration/expiration, pain that travels to your arms, neck or back, nausea, vomiting, severe headache, tearing pain in your chest, dizziness, feel as though you are about to pass out, OR if you have any worsening symptoms, or any other concerns.

## 2024-04-10 ENCOUNTER — POSTPARTUM VISIT (OUTPATIENT)
Age: 39
End: 2024-04-10
Payer: MEDICAID

## 2024-04-10 VITALS
BODY MASS INDEX: 29.98 KG/M2 | WEIGHT: 158.8 LBS | SYSTOLIC BLOOD PRESSURE: 114 MMHG | HEIGHT: 61 IN | DIASTOLIC BLOOD PRESSURE: 78 MMHG

## 2024-04-10 DIAGNOSIS — Z09 FOLLOW-UP EXAMINATION, FOLLOWING OTHER SURGERY: ICD-10-CM

## 2024-04-10 RX ORDER — PEN NEEDLE, DIABETIC 32GX 5/32"
NEEDLE, DISPOSABLE MISCELLANEOUS
COMMUNITY
Start: 2024-03-28

## 2024-04-10 NOTE — PROGRESS NOTES
"Chief Complaint  Post-op (Pt here for post -op visit, delivered repeat c/s with tubal 3. Pt denies any problems. )    Subjective        Andria Ureña presents to [facility] for post op visit. Pt s/p repeat c/s with bilateral salpingectomy. Pt with ER visit 2 days ago due to episode of heavier VB with hx anemia. Hg up to 9.8.  Pt reports VB quickly improved. CT normal with only post op changes noted. Pt reports symptoms resolved and now VB is scant. Baby is breastfeeding well. Denies pp depression.   History of Present Illness    Objective   Vital Signs:  /78   Ht 154.9 cm (60.98\")   Wt 72 kg (158 lb 12.8 oz)   BMI 30.02 kg/m²   Estimated body mass index is 30.02 kg/m² as calculated from the following:    Height as of this encounter: 154.9 cm (60.98\").    Weight as of this encounter: 72 kg (158 lb 12.8 oz).               Physical Exam   Abdomen: soft/NT/ND normal BS, incision well healing, steri-strips removed. No s/s infection  Result Review :                     Assessment and Plan     Diagnoses and all orders for this visit:    1. Postpartum care following  delivery (Primary)  Post op repeat c/s with tubal ligation, doing well. Post op anemia, Hg now 9.8. Pt instructed to take daily PNV and iron as tolerated. RTC 4 weeks for postpartum visit. CBC then.   2. Follow-up examination, following other surgery             Follow Up     Return in about 4 weeks (around 2024).  Patient was given instructions and counseling regarding her condition or for health maintenance advice. Please see specific information pulled into the AVS if appropriate.         "

## 2024-08-21 NOTE — ANESTHESIA POSTPROCEDURE EVALUATION
Patient: Andria Ureña    Procedure Summary     Date: 21 Room / Location: [facility] [place] LABOR DELIVERY 1 /  [facility] LABOR DELIVERY    Anesthesia Start: 756 Anesthesia Stop: 923    Procedure: REPEAT  SECTION WITHOUT TUBAL LIGATION (N/A Abdomen) Diagnosis: (PREVIOUS CD)    Surgeons: Priya Moreno MD Provider: Vicente Galarza CRNA    Anesthesia Type: epidural ASA Status: 2          Anesthesia Type: epidural    Vitals  Vitals Value Taken Time   /46 21 0340   Temp 98 °F (36.7 °C) 21 0340   Pulse 72 21 034   Resp 18 21 034   SpO2 99 % 21           Post Anesthesia Care and Evaluation    Patient location during evaluation: floor  Patient participation: complete - patient participated  Level of consciousness: awake and alert  Pain score: 0  Pain management: adequate  Airway patency: patent  Anesthetic complications: No anesthetic complications  PONV Status: none  Cardiovascular status: acceptable and stable  Respiratory status: acceptable  Hydration status: acceptable  Post Neuraxial Block status: Motor and sensory function returned to baseline and No signs or symptoms of PDPH     Detail Level: Zone

## 2024-09-18 ENCOUNTER — OFFICE VISIT (OUTPATIENT)
Dept: FAMILY MEDICINE CLINIC | Facility: CLINIC | Age: 39
End: 2024-09-18
Payer: MEDICAID

## 2024-09-18 VITALS
WEIGHT: 167.4 LBS | RESPIRATION RATE: 16 BRPM | HEIGHT: 61 IN | OXYGEN SATURATION: 97 % | SYSTOLIC BLOOD PRESSURE: 122 MMHG | BODY MASS INDEX: 31.6 KG/M2 | DIASTOLIC BLOOD PRESSURE: 66 MMHG | HEART RATE: 84 BPM

## 2024-09-18 DIAGNOSIS — S05.02XA ABRASION OF LEFT CORNEA, INITIAL ENCOUNTER: Primary | ICD-10-CM

## 2024-09-18 DIAGNOSIS — H57.12 LEFT EYE PAIN: ICD-10-CM

## 2024-09-18 PROCEDURE — 99213 OFFICE O/P EST LOW 20 MIN: CPT

## 2024-09-18 PROCEDURE — 1160F RVW MEDS BY RX/DR IN RCRD: CPT

## 2024-09-18 PROCEDURE — 1159F MED LIST DOCD IN RCRD: CPT

## 2024-09-18 RX ORDER — NEOMYCIN/POLYMYXIN B/HYDROCORT 3.5-10K-1
1 SUSPENSION, DROPS(FINAL DOSAGE FORM)(ML) OPHTHALMIC (EYE) 4 TIMES DAILY
Qty: 7.5 ML | Refills: 0 | Status: SHIPPED | OUTPATIENT
Start: 2024-09-18

## 2024-10-23 ENCOUNTER — TELEPHONE (OUTPATIENT)
Dept: OBSTETRICS AND GYNECOLOGY | Age: 39
End: 2024-10-23
Payer: MEDICAID

## 2024-10-23 ENCOUNTER — OFFICE VISIT (OUTPATIENT)
Dept: FAMILY MEDICINE CLINIC | Facility: CLINIC | Age: 39
End: 2024-10-23
Payer: MEDICAID

## 2024-10-23 VITALS
DIASTOLIC BLOOD PRESSURE: 83 MMHG | BODY MASS INDEX: 32.66 KG/M2 | HEIGHT: 61 IN | HEART RATE: 98 BPM | WEIGHT: 173 LBS | SYSTOLIC BLOOD PRESSURE: 139 MMHG | OXYGEN SATURATION: 99 %

## 2024-10-23 DIAGNOSIS — F90.0 ATTENTION DEFICIT HYPERACTIVITY DISORDER (ADHD), PREDOMINANTLY INATTENTIVE TYPE: Primary | ICD-10-CM

## 2024-10-23 PROCEDURE — 99213 OFFICE O/P EST LOW 20 MIN: CPT | Performed by: PEDIATRICS

## 2024-10-23 NOTE — ASSESSMENT & PLAN NOTE
Psychological condition is worsening.  Is currently breast feeding  Continue current treatment regimen.  Non medicine hints were given  Psychological condition  will be reassessed 6 months.

## 2024-10-23 NOTE — TELEPHONE ENCOUNTER
Called pt, informed that vyvanse not recommended or anything else compatible while breastfeeding. Pt voiced understanding.

## 2024-10-23 NOTE — TELEPHONE ENCOUNTER
Caller: Andria Ureña    Relationship to patient: Self    Best call back number: [phone] (home)       Patient is needing: PT WOULD LIKE TO KNOW IF VYVANSE IS SAFE WHILE BREASTFEEDING. IF IT ISN'T, SHE IS WANTING TO KNOW IF THERE IS ANYTHING COMPATIBLE TO IT THAT WOULD BE SAFE. PLEASE CALL PT AND LEAVE DETAILED VM IF SHE DOES NOT ANSWER.

## 2024-10-23 NOTE — PROGRESS NOTES
"Chief Complaint  ADHD    Subjective    History of Present Illness      Patient presents to [facility] for   History of Present Illness  Pt is here today to discuss possible ADHD options while breastfeeding.        Review of Systems    I have reviewed and agree with the HPI information as above.  Vicente Bean MD     Objective   Vital Signs:   /83   Pulse 98   Ht 154.9 cm (61\")   Wt 78.5 kg (173 lb)   SpO2 99%   BMI 32.69 kg/m²     BMI is >= 30 and <35. (Class 1 Obesity). The following options were offered after discussion;: none (medical contraindication)      Physical Exam  Constitutional:       Appearance: Normal appearance. She is normal weight.   Cardiovascular:      Rate and Rhythm: Normal rate and regular rhythm.      Heart sounds: Normal heart sounds.   Pulmonary:      Effort: Pulmonary effort is normal.      Breath sounds: Normal breath sounds.   Neurological:      Mental Status: She is alert.   Psychiatric:         Mood and Affect: Mood normal.         Behavior: Behavior normal.                  Result Review  Data Reviewed:                   Assessment and Plan      Diagnoses and all orders for this visit:    1. Attention deficit hyperactivity disorder (ADHD), predominantly inattentive type (Primary)  Assessment & Plan:  Psychological condition is worsening.  Is currently breast feeding  Continue current treatment regimen.  Non medicine hints were given  Psychological condition  will be reassessed 6 months.              Follow Up   Return in about 6 months (around 4/23/2025).  Patient was given instructions and counseling regarding her condition or for health maintenance advice. Please see specific information pulled into the AVS if appropriate.       "

## 2024-12-05 ENCOUNTER — HOSPITAL ENCOUNTER (EMERGENCY)
Facility: HOSPITAL | Age: 39
Discharge: HOME | End: 2024-12-05
Payer: MEDICAID

## 2024-12-05 VITALS
HEIGHT: 61 IN | TEMPERATURE: 98.3 F | DIASTOLIC BLOOD PRESSURE: 78 MMHG | HEART RATE: 83 BPM | WEIGHT: 174.5 LBS | BODY MASS INDEX: 32.94 KG/M2 | OXYGEN SATURATION: 98 % | SYSTOLIC BLOOD PRESSURE: 125 MMHG | RESPIRATION RATE: 16 BRPM

## 2024-12-05 DIAGNOSIS — S61.210A LACERATION OF RIGHT INDEX FINGER WITHOUT FOREIGN BODY WITHOUT DAMAGE TO NAIL, INITIAL ENCOUNTER: Primary | ICD-10-CM

## 2024-12-05 PROCEDURE — 25010000002 LIDOCAINE 1 % SOLUTION: Performed by: NURSE PRACTITIONER

## 2024-12-05 PROCEDURE — 99282 EMERGENCY DEPT VISIT SF MDM: CPT

## 2024-12-05 RX ORDER — LIDOCAINE HYDROCHLORIDE 10 MG/ML
10 INJECTION, SOLUTION INFILTRATION; PERINEURAL ONCE
Status: COMPLETED | OUTPATIENT
Start: 2024-12-05 | End: 2024-12-05

## 2024-12-05 RX ORDER — GINSENG 100 MG
CAPSULE ORAL 2 TIMES DAILY
Qty: 14 G | Refills: 0 | Status: SHIPPED | OUTPATIENT
Start: 2024-12-05

## 2024-12-05 RX ADMIN — LIDOCAINE HYDROCHLORIDE 10 ML: 10 INJECTION, SOLUTION INFILTRATION; PERINEURAL at 15:35

## 2024-12-05 NOTE — DISCHARGE INSTRUCTIONS
Apply thin layer of bacitracin 1-2 times per day  Avoid any peroxide or alcohol to the wound  Avoid dirty water  Wear finger splint to help with approximation of the wound  Return in 14 days for suture removal

## 2024-12-05 NOTE — ED PROVIDER NOTES
Subjective   History of Present Illness  Patient is a 39-year-old female who presents to the ER with complaints of a laceration.  She states that she is in the process of moving and she put her hand through a box that apparently had a knife and it causing her to cut her right index finger.  She is uncertain if she is up-to-date on her Tdap.  She presents for laceration repair.  Past medical history significant for ADHD, anxiety, depression, migraines        Review of Systems   Constitutional: Negative.  Negative for fever.   HENT: Negative.  Negative for congestion.    Respiratory: Negative.  Negative for cough.    Cardiovascular: Negative.    Gastrointestinal: Negative.  Negative for abdominal pain, diarrhea, nausea and vomiting.   Genitourinary: Negative.    Musculoskeletal: Negative.    Skin:  Positive for wound.        Positive for laceration of the right index finger   All other systems reviewed and are negative.      Past Medical History:   Diagnosis Date    ADHD     Anxiety     Depression     Migraine     Recurrent pregnancy loss, antepartum condition or complication        No Known Allergies    Past Surgical History:   Procedure Laterality Date     SECTION       SECTION N/A 2021    Procedure: REPEAT  SECTION WITHOUT TUBAL LIGATION;  Surgeon: Priya Moreno MD;  Location: [facility] LABOR DELIVERY;  Service: Obstetrics/Gynecology;  Laterality: N/A;     SECTION WITH TUBAL Bilateral 3/27/2024    Procedure: REPEAT  SECTION WITH TUBAL LIGATION;  Surgeon: Neris Nelson MD;  Location: [facility] LABOR DELIVERY;  Service: Obstetrics/Gynecology;  Laterality: Bilateral;    D & C WITH SUCTION      EXPLORATORY LAPAROTOMY         Family History   Problem Relation Age of Onset    Hyperlipidemia Mother     Cancer Father     Stroke Maternal Grandfather     Stroke Paternal Grandfather     Hyperlipidemia Sister     Hyperlipidemia Sister     Miscarriages / Stillbirths  Sister         Katelyn also had 2 miscarriages    Vision loss Sister     Hyperlipidemia Brother     Hyperlipidemia Brother        Social History     Socioeconomic History    Marital status:    Tobacco Use    Smoking status: Never     Passive exposure: Never    Smokeless tobacco: Never   Vaping Use    Vaping status: Never Used   Substance and Sexual Activity    Alcohol use: No    Drug use: No    Sexual activity: Yes     Partners: Male     Birth control/protection: Rhythm, Coitus interruptus           Objective   Physical Exam  Vitals and nursing note reviewed.   Constitutional:       Appearance: She is well-developed.   HENT:      Head: Normocephalic and atraumatic.      Right Ear: External ear normal.      Left Ear: External ear normal.      Nose: Nose normal.      Mouth/Throat:      Pharynx: Oropharynx is clear.   Eyes:      Extraocular Movements: Extraocular movements intact.      Conjunctiva/sclera: Conjunctivae normal.   Cardiovascular:      Rate and Rhythm: Normal rate and regular rhythm.      Heart sounds: Normal heart sounds.   Pulmonary:      Effort: Pulmonary effort is normal.      Breath sounds: Normal breath sounds.   Abdominal:      General: Bowel sounds are normal.      Palpations: Abdomen is soft.   Musculoskeletal:         General: Normal range of motion.      Cervical back: Normal range of motion and neck supple.      Comments: U-shaped laceration measuring approximately 0.5 cm to the right index finger with bleeding controlled   Skin:     General: Skin is warm and dry.   Neurological:      Mental Status: She is alert and oriented to person, place, and time.   Psychiatric:         Mood and Affect: Mood normal.         Behavior: Behavior normal.         Thought Content: Thought content normal.         Judgment: Judgment normal.         Laceration Repair    Date/Time: 12/5/2024 3:50 PM    Performed by: Barbra Coughlin APRN  Authorized by: Barbra Coughlin APRN    Consent:     Consent  obtained:  Verbal    Consent given by:  Patient    Risks discussed:  Pain and infection  Universal protocol:     Patient identity confirmed:  Verbally with patient  Anesthesia:     Anesthesia method:  Local infiltration    Local anesthetic:  Lidocaine 1% w/o epi  Laceration details:     Location:  Finger    Finger location:  R index finger    Length (cm):  0.5  Pre-procedure details:     Preparation:  Patient was prepped and draped in usual sterile fashion  Exploration:     Imaging outcome: foreign body not noted      Wound extent: no foreign bodies/material noted and no underlying fracture noted      Contaminated: no    Treatment:     Area cleansed with:  Shur-Clens and saline    Amount of cleaning:  Standard    Irrigation solution:  Tap water    Irrigation method:  Syringe    Debridement:  None  Skin repair:     Repair method:  Sutures    Suture size:  4-0    Suture material:  Nylon    Suture technique:  Simple interrupted    Number of sutures:  3  Approximation:     Approximation:  Close  Repair type:     Repair type:  Simple  Post-procedure details:     Dressing:  Splint for protection, non-adherent dressing and antibiotic ointment    Procedure completion:  Tolerated well, no immediate complications             ED Course                                                       Medical Decision Making  Patient is a 39-year-old female who presents to the ER with complaints of a laceration.  She states that she is in the process of moving and she put her hand through a box that apparently had a knife and it causing her to cut her right index finger.  She is uncertain if she is up-to-date on her Tdap.  She presents for laceration repair.  Past medical history significant for ADHD, anxiety, depression, migraines    Differential diagnosis includes but not limited to laceration, bony fracture, and other etiologies    Patient has a superficial laceration to the right index finger which was repaired in the emergency  department.  She has full range of motion intact to the digit.  Ordered a Tdap however patient declined.  She is currently breast-feeding and we did explain to the patient it is safe for patient to have a Tdap while breast-feeding however she believes she may be up-to-date and preferred to declined at this time.  We recommend to keep the wound clean and dry with mild soap only.  Avoid any dirty water, peroxide, or alcohol to the wound.  Apply thin layer bacitracin twice daily.  She was given a finger splint for added protection.  She will need to return in 14 days for suture removal.  She will be discharged home shortly in stable condition.    Problems Addressed:  Laceration of right index finger without foreign body without damage to nail, initial encounter: acute illness or injury    Risk  OTC drugs.  Prescription drug management.        Final diagnoses:   Laceration of right index finger without foreign body without damage to nail, initial encounter       ED Disposition  ED Disposition       ED Disposition   Discharge    Condition   Good    Comment   --               No follow-up provider specified.       Medication List        New Prescriptions      bacitracin 500 UNIT/GM ointment  Apply  topically to the appropriate area as directed 2 (Two) Times a Day.               Where to Get Your Medications        These medications were sent to [facility] [address] [place] [address] [address] [facility] - [phone] [facility] [phone]   [address] [place] [id number]      Phone: [phone]   bacitracin 500 UNIT/GM ointment            Barbra Coughlin, APRN  12/05/24 0219

## (undated) DEVICE — PAD C-SECTION: Brand: MEDLINE INDUSTRIES, INC.

## (undated) DEVICE — STRIP,CLOSURE,WOUND,MEDI-STRIP,1/2X4: Brand: MEDLINE

## (undated) DEVICE — TRAXI PANNICULUS RETRACTOR WITH RETENTUS TECHNOLOGY (BMI 30-50): Brand: TRAXI® PANNICULUS RETRACTOR

## (undated) DEVICE — Device

## (undated) DEVICE — PATIENT RETURN ELECTRODE, SINGLE-USE, CONTACT QUALITY MONITORING, ADULT, WITH 15 FT (4.5 M) CORD. FOR PATIENTS WEIGHING OVER 33LBS. (15KG): Brand: MEGADYNE

## (undated) DEVICE — SUT MONOCRYL 4/0 PS2 27IN Y426H ETY426H

## (undated) DEVICE — SYS SKIN CLS DERMABOND PRINEO W/22CM MESH TP

## (undated) DEVICE — APPL CHLORAPREP HI/LITE 26ML ORNG

## (undated) DEVICE — GOWN,PREVENTION PLUS,XLARGE,STERILE: Brand: MEDLINE

## (undated) DEVICE — ANTIBACTERIAL UNDYED BRAIDED (POLYGLACTIN 910), SYNTHETIC ABSORBABLE SUTURE: Brand: COATED VICRYL

## (undated) DEVICE — KENDALL SCD EXPRESS SLEEVES, KNEE LENGTH, MEDIUM: Brand: KENDALL SCD

## (undated) DEVICE — 3M™ MEDIPORE™ H SOFT CLOTH SURGICAL TAPE 2868, 8 INCH X 10 YARD (20,3CM X 9,1M), 6 ROLLS/CASE: Brand: 3M™ MEDIPORE™

## (undated) DEVICE — SUT GUT PLN 0 CT3 27IN N864H

## (undated) DEVICE — SUT VIC 0 CT1 36IN J946H

## (undated) DEVICE — CVR HNDL LIGHT RIGID

## (undated) DEVICE — GLOVE,SURG,SENSICARE SLT,LF,PF,6.5: Brand: MEDLINE

## (undated) DEVICE — PAD,NON-ADHERENT,3X8,STERILE,LF,1/PK: Brand: MEDLINE

## (undated) DEVICE — TBG PENCL TELESCP MEGADYNE SMOKE EVAC 10FT

## (undated) DEVICE — GLV SURG TRIUMPH PF LTX 7 STRL

## (undated) DEVICE — SPNG GZ WOVN 4X4IN 12PLY 10/BX STRL

## (undated) DEVICE — ADHS LIQ MASTISOL 2/3ML

## (undated) DEVICE — 3M™ STERI-STRIP™ REINFORCED ADHESIVE SKIN CLOSURES, R1547, 1/2 IN X 4 IN (12 MM X 100 MM), 6 STRIPS/ENVELOPE: Brand: 3M™ STERI-STRIP™

## (undated) DEVICE — GOWN,PREVENTION PLUS,LARGE,STERILE: Brand: MEDLINE